# Patient Record
Sex: MALE | Race: WHITE | NOT HISPANIC OR LATINO | URBAN - METROPOLITAN AREA
[De-identification: names, ages, dates, MRNs, and addresses within clinical notes are randomized per-mention and may not be internally consistent; named-entity substitution may affect disease eponyms.]

---

## 2018-01-01 ENCOUNTER — INPATIENT (INPATIENT)
Facility: HOSPITAL | Age: 53
LOS: 0 days | Discharge: LEFT BEFORE DISCHARGE | End: 2018-01-01
Attending: INTERNAL MEDICINE

## 2018-01-01 DIAGNOSIS — R07.81 PLEURODYNIA: ICD-10-CM

## 2018-01-05 DIAGNOSIS — Z80.1 FAMILY HISTORY OF MALIGNANT NEOPLASM OF TRACHEA, BRONCHUS AND LUNG: ICD-10-CM

## 2018-01-05 DIAGNOSIS — E66.3 OVERWEIGHT: ICD-10-CM

## 2018-01-05 DIAGNOSIS — Z82.3 FAMILY HISTORY OF STROKE: ICD-10-CM

## 2018-01-05 DIAGNOSIS — R07.9 CHEST PAIN, UNSPECIFIED: ICD-10-CM

## 2018-01-05 DIAGNOSIS — M94.0 CHONDROCOSTAL JUNCTION SYNDROME [TIETZE]: ICD-10-CM

## 2018-01-05 DIAGNOSIS — R07.81 PLEURODYNIA: ICD-10-CM

## 2019-06-11 ENCOUNTER — OUTPATIENT (OUTPATIENT)
Dept: OUTPATIENT SERVICES | Facility: HOSPITAL | Age: 54
LOS: 1 days | Discharge: HOME | End: 2019-06-11

## 2019-06-11 DIAGNOSIS — E55.9 VITAMIN D DEFICIENCY, UNSPECIFIED: ICD-10-CM

## 2019-06-11 DIAGNOSIS — D64.9 ANEMIA, UNSPECIFIED: ICD-10-CM

## 2019-06-11 DIAGNOSIS — Z00.00 ENCOUNTER FOR GENERAL ADULT MEDICAL EXAMINATION WITHOUT ABNORMAL FINDINGS: ICD-10-CM

## 2019-06-11 DIAGNOSIS — N18.2 CHRONIC KIDNEY DISEASE, STAGE 2 (MILD): ICD-10-CM

## 2019-06-11 DIAGNOSIS — N39.0 URINARY TRACT INFECTION, SITE NOT SPECIFIED: ICD-10-CM

## 2019-06-11 DIAGNOSIS — K76.89 OTHER SPECIFIED DISEASES OF LIVER: ICD-10-CM

## 2019-09-23 ENCOUNTER — OUTPATIENT (OUTPATIENT)
Dept: OUTPATIENT SERVICES | Facility: HOSPITAL | Age: 54
LOS: 1 days | Discharge: HOME | End: 2019-09-23

## 2019-09-23 DIAGNOSIS — R79.89 OTHER SPECIFIED ABNORMAL FINDINGS OF BLOOD CHEMISTRY: ICD-10-CM

## 2019-09-23 DIAGNOSIS — D64.9 ANEMIA, UNSPECIFIED: ICD-10-CM

## 2019-09-23 DIAGNOSIS — E11.65 TYPE 2 DIABETES MELLITUS WITH HYPERGLYCEMIA: ICD-10-CM

## 2020-10-14 ENCOUNTER — INPATIENT (INPATIENT)
Facility: HOSPITAL | Age: 55
LOS: 2 days | Discharge: HOME | End: 2020-10-17
Attending: INTERNAL MEDICINE | Admitting: HOSPITALIST
Payer: COMMERCIAL

## 2020-10-14 VITALS
DIASTOLIC BLOOD PRESSURE: 81 MMHG | WEIGHT: 255.07 LBS | SYSTOLIC BLOOD PRESSURE: 154 MMHG | OXYGEN SATURATION: 97 % | RESPIRATION RATE: 18 BRPM | TEMPERATURE: 98 F | HEART RATE: 96 BPM

## 2020-10-14 LAB
ALBUMIN SERPL ELPH-MCNC: 4.4 G/DL — SIGNIFICANT CHANGE UP (ref 3.5–5.2)
ALP SERPL-CCNC: 46 U/L — SIGNIFICANT CHANGE UP (ref 30–115)
ALT FLD-CCNC: 49 U/L — HIGH (ref 0–41)
ANION GAP SERPL CALC-SCNC: 14 MMOL/L — SIGNIFICANT CHANGE UP (ref 7–14)
AST SERPL-CCNC: 38 U/L — SIGNIFICANT CHANGE UP (ref 0–41)
B-OH-BUTYR SERPL-SCNC: <0.2 MMOL/L — SIGNIFICANT CHANGE UP
BASOPHILS # BLD AUTO: 0.06 K/UL — SIGNIFICANT CHANGE UP (ref 0–0.2)
BASOPHILS NFR BLD AUTO: 0.7 % — SIGNIFICANT CHANGE UP (ref 0–1)
BILIRUB SERPL-MCNC: 0.7 MG/DL — SIGNIFICANT CHANGE UP (ref 0.2–1.2)
BUN SERPL-MCNC: 19 MG/DL — SIGNIFICANT CHANGE UP (ref 10–20)
CALCIUM SERPL-MCNC: 10.2 MG/DL — HIGH (ref 8.5–10.1)
CHLORIDE SERPL-SCNC: 102 MMOL/L — SIGNIFICANT CHANGE UP (ref 98–110)
CO2 SERPL-SCNC: 21 MMOL/L — SIGNIFICANT CHANGE UP (ref 17–32)
CREAT SERPL-MCNC: 1 MG/DL — SIGNIFICANT CHANGE UP (ref 0.7–1.5)
EOSINOPHIL # BLD AUTO: 0.2 K/UL — SIGNIFICANT CHANGE UP (ref 0–0.7)
EOSINOPHIL NFR BLD AUTO: 2.5 % — SIGNIFICANT CHANGE UP (ref 0–8)
GLUCOSE BLDC GLUCOMTR-MCNC: 151 MG/DL — HIGH (ref 70–99)
GLUCOSE BLDC GLUCOMTR-MCNC: 176 MG/DL — HIGH (ref 70–99)
GLUCOSE SERPL-MCNC: 180 MG/DL — HIGH (ref 70–99)
HCT VFR BLD CALC: 47.8 % — SIGNIFICANT CHANGE UP (ref 42–52)
HGB BLD-MCNC: 16.5 G/DL — SIGNIFICANT CHANGE UP (ref 14–18)
IMM GRANULOCYTES NFR BLD AUTO: 0.5 % — HIGH (ref 0.1–0.3)
LYMPHOCYTES # BLD AUTO: 1.52 K/UL — SIGNIFICANT CHANGE UP (ref 1.2–3.4)
LYMPHOCYTES # BLD AUTO: 19 % — LOW (ref 20.5–51.1)
MCHC RBC-ENTMCNC: 30.2 PG — SIGNIFICANT CHANGE UP (ref 27–31)
MCHC RBC-ENTMCNC: 34.5 G/DL — SIGNIFICANT CHANGE UP (ref 32–37)
MCV RBC AUTO: 87.4 FL — SIGNIFICANT CHANGE UP (ref 80–94)
MONOCYTES # BLD AUTO: 0.69 K/UL — HIGH (ref 0.1–0.6)
MONOCYTES NFR BLD AUTO: 8.6 % — SIGNIFICANT CHANGE UP (ref 1.7–9.3)
NEUTROPHILS # BLD AUTO: 5.51 K/UL — SIGNIFICANT CHANGE UP (ref 1.4–6.5)
NEUTROPHILS NFR BLD AUTO: 68.7 % — SIGNIFICANT CHANGE UP (ref 42.2–75.2)
NRBC # BLD: 0 /100 WBCS — SIGNIFICANT CHANGE UP (ref 0–0)
PLATELET # BLD AUTO: 211 K/UL — SIGNIFICANT CHANGE UP (ref 130–400)
POTASSIUM SERPL-MCNC: 4.4 MMOL/L — SIGNIFICANT CHANGE UP (ref 3.5–5)
POTASSIUM SERPL-SCNC: 4.4 MMOL/L — SIGNIFICANT CHANGE UP (ref 3.5–5)
PROT SERPL-MCNC: 7.3 G/DL — SIGNIFICANT CHANGE UP (ref 6–8)
RBC # BLD: 5.47 M/UL — SIGNIFICANT CHANGE UP (ref 4.7–6.1)
RBC # FLD: 11.9 % — SIGNIFICANT CHANGE UP (ref 11.5–14.5)
SARS-COV-2 RNA SPEC QL NAA+PROBE: SIGNIFICANT CHANGE UP
SODIUM SERPL-SCNC: 137 MMOL/L — SIGNIFICANT CHANGE UP (ref 135–146)
TROPONIN T SERPL-MCNC: <0.01 NG/ML — SIGNIFICANT CHANGE UP
TROPONIN T SERPL-MCNC: <0.01 NG/ML — SIGNIFICANT CHANGE UP
WBC # BLD: 8.02 K/UL — SIGNIFICANT CHANGE UP (ref 4.8–10.8)
WBC # FLD AUTO: 8.02 K/UL — SIGNIFICANT CHANGE UP (ref 4.8–10.8)

## 2020-10-14 PROCEDURE — 93010 ELECTROCARDIOGRAM REPORT: CPT

## 2020-10-14 PROCEDURE — 99222 1ST HOSP IP/OBS MODERATE 55: CPT

## 2020-10-14 PROCEDURE — 70496 CT ANGIOGRAPHY HEAD: CPT | Mod: 26

## 2020-10-14 PROCEDURE — 70498 CT ANGIOGRAPHY NECK: CPT | Mod: 26

## 2020-10-14 PROCEDURE — 99285 EMERGENCY DEPT VISIT HI MDM: CPT

## 2020-10-14 RX ORDER — ASPIRIN/CALCIUM CARB/MAGNESIUM 324 MG
325 TABLET ORAL ONCE
Refills: 0 | Status: COMPLETED | OUTPATIENT
Start: 2020-10-14 | End: 2020-10-14

## 2020-10-14 RX ORDER — BACLOFEN 100 %
10 POWDER (GRAM) MISCELLANEOUS THREE TIMES A DAY
Refills: 0 | Status: DISCONTINUED | OUTPATIENT
Start: 2020-10-14 | End: 2020-10-17

## 2020-10-14 RX ORDER — INFLUENZA VIRUS VACCINE 15; 15; 15; 15 UG/.5ML; UG/.5ML; UG/.5ML; UG/.5ML
0.5 SUSPENSION INTRAMUSCULAR ONCE
Refills: 0 | Status: COMPLETED | OUTPATIENT
Start: 2020-10-14 | End: 2020-10-14

## 2020-10-14 RX ORDER — INSULIN LISPRO 100/ML
5 VIAL (ML) SUBCUTANEOUS
Refills: 0 | Status: DISCONTINUED | OUTPATIENT
Start: 2020-10-14 | End: 2020-10-17

## 2020-10-14 RX ORDER — SODIUM CHLORIDE 9 MG/ML
1000 INJECTION INTRAMUSCULAR; INTRAVENOUS; SUBCUTANEOUS ONCE
Refills: 0 | Status: COMPLETED | OUTPATIENT
Start: 2020-10-14 | End: 2020-10-14

## 2020-10-14 RX ORDER — SODIUM CHLORIDE 9 MG/ML
1000 INJECTION, SOLUTION INTRAVENOUS
Refills: 0 | Status: DISCONTINUED | OUTPATIENT
Start: 2020-10-14 | End: 2020-10-17

## 2020-10-14 RX ORDER — ENOXAPARIN SODIUM 100 MG/ML
40 INJECTION SUBCUTANEOUS AT BEDTIME
Refills: 0 | Status: DISCONTINUED | OUTPATIENT
Start: 2020-10-14 | End: 2020-10-17

## 2020-10-14 RX ORDER — INSULIN GLARGINE 100 [IU]/ML
15 INJECTION, SOLUTION SUBCUTANEOUS AT BEDTIME
Refills: 0 | Status: DISCONTINUED | OUTPATIENT
Start: 2020-10-14 | End: 2020-10-17

## 2020-10-14 RX ORDER — GLUCAGON INJECTION, SOLUTION 0.5 MG/.1ML
1 INJECTION, SOLUTION SUBCUTANEOUS ONCE
Refills: 0 | Status: DISCONTINUED | OUTPATIENT
Start: 2020-10-14 | End: 2020-10-17

## 2020-10-14 RX ORDER — DEXTROSE 50 % IN WATER 50 %
15 SYRINGE (ML) INTRAVENOUS ONCE
Refills: 0 | Status: DISCONTINUED | OUTPATIENT
Start: 2020-10-14 | End: 2020-10-17

## 2020-10-14 RX ORDER — DEXTROSE 50 % IN WATER 50 %
12.5 SYRINGE (ML) INTRAVENOUS ONCE
Refills: 0 | Status: DISCONTINUED | OUTPATIENT
Start: 2020-10-14 | End: 2020-10-17

## 2020-10-14 RX ORDER — DEXTROSE 50 % IN WATER 50 %
25 SYRINGE (ML) INTRAVENOUS ONCE
Refills: 0 | Status: DISCONTINUED | OUTPATIENT
Start: 2020-10-14 | End: 2020-10-17

## 2020-10-14 RX ORDER — AMLODIPINE BESYLATE 2.5 MG/1
5 TABLET ORAL DAILY
Refills: 0 | Status: DISCONTINUED | OUTPATIENT
Start: 2020-10-14 | End: 2020-10-17

## 2020-10-14 RX ORDER — INSULIN LISPRO 100/ML
VIAL (ML) SUBCUTANEOUS
Refills: 0 | Status: DISCONTINUED | OUTPATIENT
Start: 2020-10-14 | End: 2020-10-17

## 2020-10-14 RX ORDER — ASPIRIN/CALCIUM CARB/MAGNESIUM 324 MG
81 TABLET ORAL DAILY
Refills: 0 | Status: DISCONTINUED | OUTPATIENT
Start: 2020-10-15 | End: 2020-10-17

## 2020-10-14 RX ORDER — ATORVASTATIN CALCIUM 80 MG/1
80 TABLET, FILM COATED ORAL AT BEDTIME
Refills: 0 | Status: DISCONTINUED | OUTPATIENT
Start: 2020-10-14 | End: 2020-10-17

## 2020-10-14 RX ADMIN — SODIUM CHLORIDE 1000 MILLILITER(S): 9 INJECTION INTRAMUSCULAR; INTRAVENOUS; SUBCUTANEOUS at 12:26

## 2020-10-14 RX ADMIN — ENOXAPARIN SODIUM 40 MILLIGRAM(S): 100 INJECTION SUBCUTANEOUS at 22:40

## 2020-10-14 RX ADMIN — Medication 325 MILLIGRAM(S): at 15:16

## 2020-10-14 RX ADMIN — INSULIN GLARGINE 15 UNIT(S): 100 INJECTION, SOLUTION SUBCUTANEOUS at 23:15

## 2020-10-14 RX ADMIN — ATORVASTATIN CALCIUM 80 MILLIGRAM(S): 80 TABLET, FILM COATED ORAL at 22:41

## 2020-10-14 NOTE — CONSULT NOTE ADULT - SUBJECTIVE AND OBJECTIVE BOX
PEDRITO DIAZ     55y     Male    MRN-[id number]                                                           CC:Patient is a 55y old  Male who presents with a chief complaint of     HPI: 54 yo m hx DM- poorly compliant  at 8:30am, pt was watching TV and had sudden painless vision loss x seconds. pt states he felt shaky/anxious after. pt states vision significantly improved after/with glasses but not baseline. pt states FS was 300 at that time, however, pt had just eaten breakfast. no numbness/weakness/ataxia/slurred speech. no fever/chills/cough.    Patient seen and examined and complains of sudden change in his vision at 8:30 this morning.  Since then his vision has been blurry.  Vision does not improve when he covers one eye and both eyes are blurry.  He has been complaining of increased urination at night where he may wake up 4-5 time per night to urinate.  He admits to right shoulder pain due too injury a few months ago.  He also has been having numbness to the tips of the fingers in his left hand.  Admits to severe stress in his family life currently more then he can deal with.    ROS:  Constitutional, Neurological, Psychiatric, Eyes, ENT, Cardiovascular, Respiratory, Gastrointestinal, Genitourinary, Musculoskeletal, Integumentary, Endocrine and Heme/Lymph are otherwise negative. Except for above    Social History: No smoking, No drinking, No drug use    FAMILY HISTORY: non contributory      HEALTH ISSUES - PROBLEM Dx:          Vital Signs Last 24 Hrs  T(C): 36.9 (14 Oct 2020 11:18), Max: 36.9 (14 Oct 2020 11:18)  T(F): 98.4 (14 Oct 2020 11:18), Max: 98.4 (14 Oct 2020 11:18)  HR: 96 (14 Oct 2020 11:18) (96 - 96)  BP: 154/81 (14 Oct 2020 11:18) (154/81 - 154/81)  BP(mean): --  RR: 18 (14 Oct 2020 11:18) (18 - 18)  SpO2: 97% (14 Oct 2020 11:18) (97% - 97%)    Physical Exam:  Constitutional: alert and in no acute distress.  Eyes: the sclera and conjunctiva were normal, pupils were equal in size, round, reactive to light, with normal accommodation and extraocular movements were intact.   Back: no costovertebral angle tenderness and no spinal tenderness.      Neuro Exam:  Orientation: oriented to person, oriented to place and oriented to time.   Attention: normal concentrating ability and visual attention was not decreased.   Language: no difficulty naming common objects, no difficulty repeating a phrase, no difficulty writing a sentence, fluency intact, comprehension intact and reading intact.   Fund of knowledge: displays adequate knowledge of personal past history.   Cranial Nerves: visual acuity decreased b/l, visual fields full to confrontation, pupils equal round and reactive to light, extraocular motion intact, facial sensation intact symmetrically, face symmetrical, hearing was intact bilaterally, tongue and palate midline, head turning and shoulder shrug symmetric and there was no tongue deviation with protrusion.  No change in blurriness with one eye closed.  Has b/l partial ptosis unclear if new says his eyes have been tired  Motor: power 5/5 except in RUE proximally limited secondary to shoulder pain, arm flexion 4+/5 and finger extension 5-/5, right HF 5-/5  Sensory exam: light touch, temp, vib was intact.   Coordination:. normal gait. balance was intact. there was no past-pointing. no tremor present.   Deep tendon reflexes:   1+ in UE and absent in LE  Plantar responses normal on the right, normal on the left.      NIHSS: 0  mrankin 0    Allergies    No Known Allergies    Intolerances       Home Medications:      MEDICATIONS  (STANDING):  aspirin 325 milliGRAM(s) Oral Once    MEDICATIONS  (PRN):      LABS:                        16.5   8.02  )-----------( 211      ( 14 Oct 2020 12:16 )             47.8     10-14    137  |  102  |  19  ----------------------------<  180<H>  4.4   |  21  |  1.0    Ca    10.2<H>      14 Oct 2020 12:16    TPro  7.3  /  Alb  4.4  /  TBili  0.7  /  DBili  x   /  AST  38  /  ALT  49<H>  /  AlkPhos  46  10-14            Neuro Imaging:  NCHCT:   < from: CT Angio Head w/ IV Cont (10.14.20 @ 13:43) >      PROCEDURE DATE:  10/14/2020            INTERPRETATION:  CLINICAL INDICATION: Sudden onset vision changes. .    TECHNIQUE: Noncontrast head CT was performed. In addition, CT angiography of the intracranial (head) and extracranial (neck) circulation was performed after contrast administration.    Multiple contiguous axial slices were obtained from the skull base to vertex without intravenous contrast. Reformatted coronal and sagittal images were subsequently obtained and reviewed.    Maximal intensity projection images were additionally included and reviewed.    100 mls of Omnipaque 350 was administered intravenously without complication and 0 mls were discarded.    Using a separate workstation, 3-D shaded surface reformations were made of vasculature. 3-D reformations were reviewed and included in interpretation of the official report.    CAROTID STENOSIS REFERENCE: (NASCET = 100x1-(N/D)). N=greatest narrowing. D=normal distal diameter - MILD = <50% stenosis. - MODERATE = 50-69% stenosis. - SEVERE = 70-89% stenosis. - HAIRLINE/CRITICAL = 90-99% stenosis. - OCCLUDED = 100% stenosis.    COMPARISON: None.    FINDINGS:    HEAD CT:    There is no evidence for acute intracranial hemorrhage, mass effect or midline shift.    The basal cisterns are patent. There is no hydrocephalus.    There is no extra-axial collection.    The visualized orbits are unremarkable.    The calvarium is intact, without depressed fracture.    The visualized paranasal sinuses and mastoid air cells are clear.    HEAD CTA:    The internal carotid arteries demonstrate normal enhancement to the intracranial circulation and [place]. There is atherosclerotic calcification of the cavernous carotids bilaterally without significant stenosis.    Anterior and middle cerebral arteries demonstrate normal enhancement and symmetric arborization without intraluminal filling defect, stenosis, occlusion, aneurysm or vascular malformation.    The ophthalmic arteries are patent.    The intradural vertebral arteries demonstrate normal enhancement to the vertebrobasilar confluence. Branch vasculature of the posterior circulation are within normal limits. The posterior cerebral arteries demonstrate symmetric enhancement and arborization without evidence for aneurysm, stenosis, occlusion or vascular malformation. The bilateral PCAs are fetal in origin.    Visualized dural venous sinuses and deep cerebral venous structures demonstrate normal enhancement without evidence for filling defect.    NECK CTA:    The aortic arch and origins of the great vessels are within normal limits.    Right:  The origin of the right common carotid artery is normal. The right common carotid artery is normal in course and caliber to the carotid bifurcation. There is atherosclerotic calcification at the carotid bulb without significant stenosis. Origins of the internal and external carotid arteries are normal by NASCET criteria. The right internal carotid artery has normal course and caliber to the intracranial circulation.    The origin of the right vertebral artery is normal. The right vertebral artery is normal in course and caliber to the intracranial circulation.    Left: The origin of the left common carotid artery is normal. The left common carotid artery is normal in course and caliber to the carotid bifurcation. There is atherosclerotic calcification of the carotid bulb without significant stenosis. Origins of the internal and external carotid arteries are normal by NASCET criteria. The left internal carotid artery has normal course and caliber to the intracranial circulation.    The origin of the left vertebral artery is normal. The left vertebral arteryis normal in course and caliber to the intracranial circulation.    Multifocal periodontal and periapical lucencies consistent with odontogenic disease.    IMPRESSION:    CT HEAD:  No acute intracranial pathology. No evidence of midline shift, mass effect or intracranial hemorrhage.    Orbits are grossly unremarkable.    CTA HEAD:  No evidence of flow-limiting stenosis, occlusion or aneurysm.    Ophthalmic arteries appear patent and unremarkable.    CTA NECK:  No evidence of carotid or vertebral artery stenosis.    < end of copied text >      Assessment / Plan: This is a 55y year old Male presenting with blurry vision of acute onset in both eyes.  Given his diabetes which is likely poorly controlled this may be directly related to diabetic retinopathy however the acuity of it may suggest an ischemic etiology.  The right sided weakness although he relates to his shoulder issues is not fully explained by this and warrants further evaluation with MRI brain to rule out stroke  1. Admit to telemetry  2. Ophtho evaluation  3. Aspirin 325mg x1 then 81mg QD, give atorvastatin 80mg QD  4. MRI brain w/o ELLIOTT  5. 2DECHO  6. Hgba1c and lipid profile

## 2020-10-14 NOTE — ED PROVIDER NOTE - PROGRESS NOTE DETAILS
neuro exam unchanged. ed w/u neg apart from hyperglycemia, will d/w neuro CO- pt endorsed to Dr. Otero: pending neuro c/s, dispo Evaluated by Neuro Dr. Meneses, admit to tele, MR w/o gado, ASA, 2D echo. For Dr. Zaidi exam, had R hand weakness. Authored by Dr Otero: the patient was endorsed to me to follow up neurology consult and dispo.  Pt was seen by neurology service, admission to tele for further work up was recommended.  Admit. Endorsed to Dr. Astudillo, requests cardio consult also

## 2020-10-14 NOTE — H&P ADULT - NSHPLABSRESULTS_GEN_ALL_CORE
16.5   8.02  )-----------( 211      ( 14 Oct 2020 12:16 )             47.8             10-14    137  |  102  |  19  ----------------------------<  180<H>  4.4   |  21  |  1.0    Ca    10.2<H>      14 Oct 2020 12:16    TPro  7.3  /  Alb  4.4  /  TBili  0.7  /  DBili  x   /  AST  38  /  ALT  49<H>  /  AlkPhos  46  10-14    LIVER FUNCTIONS - ( 14 Oct 2020 12:16 )  Alb: 4.4 g/dL / Pro: 7.3 g/dL / ALK PHOS: 46 U/L / ALT: 49 U/L / AST: 38 U/L / GGT: x                   CARDIAC MARKERS ( 14 Oct 2020 12:33 )  x     / <0.01 ng/mL / x     / x     / x                < from: CT Head No Cont (10.14.20 @ 13:22) >    IMPRESSION:    CT HEAD:  No acute intracranial pathology. No evidence of midline shift, mass effect or intracranial hemorrhage.    Orbits are grossly unremarkable.    CTA HEAD:  No evidence of flow-limiting stenosis, occlusion or aneurysm.    Ophthalmic arteries appear patent and unremarkable.    CTA NECK:  No evidence of carotid or vertebral artery stenosis.

## 2020-10-14 NOTE — ED PROVIDER NOTE - ATTENDING CONTRIBUTION TO CARE
54 yo m hx DM- poorly compliant  at 8:30am, pt was watching TV and had sudden painless vision loss x seconds. pt states he felt shaky/anxious after. pt states vision significantly improved after/with glasses but not baseline. pt states FS was 300 at that time, however, pt had just eaten breakfast. no numbness/weakness/ataxia/slurred speech. no fever/chills/cough.    vss  gen- NAD, aaox3  HENT- visual acuity 20/30 b/l  card-rrr  lungs-ctab, no wheezing or rhonchi  abd-sntnd, no guarding or rebound  neuro- full str/sensation, cn ii-xii grossly intact, normal coordination and gait    episode of painless vision loss, c/f cva/tia  will get basic labs, r/o DKA given poorly managed diabetic, ekg, cth, cta head/neck, neuro c/s

## 2020-10-14 NOTE — ED PROVIDER NOTE - PHYSICAL EXAMINATION
CONST: Well appearing in NAD  EYES: PERRL, EOMI, Sclera and conjunctiva clear. Vision 20/30 bilaterally  ENT: No nasal discharge.   NECK: Non-tender, no meningeal signs  CARD: Normal S1 S2; Normal rate and rhythm  RESP: Equal BS B/L, No wheezes, rhonchi or rales. No distress  GI: Soft, non-tender, non-distended.  MS: Normal ROM in all extremities. No midline spinal tenderness.  SKIN: Warm, dry, no acute rashes. Good turgor  NEURO: A&Ox3, No focal deficits. Strength 5/5 with no sensory deficits. Steady gait. Appropriate finger to nose testing. [place] intact. Negative romberg/drift.

## 2020-10-14 NOTE — ED PROVIDER NOTE - NS ED ROS FT
CONST: No fever, chills or bodyaches  EYES: No pain, redness, drainage or visual changes.  ENT: No ear pain or discharge, nasal discharge or congestion. No sore throat  CARD: No chest pain, palpitations  RESP: No SOB, cough  GI: No abdominal pain, N/V/D  MS: No joint pain, back pain or extremity pain/injury  SKIN: No rashes  NEURO: see HPI

## 2020-10-14 NOTE — ED ADULT TRIAGE NOTE - CHIEF COMPLAINT QUOTE
Pt c/o episode of b/l vision loss that lasted a few seconds at approx 830, pt also endorses feeling shaky and weak during this time, pt back to baseline at this time, pt fs 242 in triage

## 2020-10-14 NOTE — ED PROVIDER NOTE - CARE PLAN
Principal Discharge DX:	Vision changes  Secondary Diagnosis:	Paresthesias in left hand  Secondary Diagnosis:	Weakness of extremity  Secondary Diagnosis:	Abnormal EKG

## 2020-10-14 NOTE — H&P ADULT - HISTORY OF PRESENT ILLNESS
56 yo m PMH DM comes to the ED reporting h/o sudden onset transient visual loss.  at 8:30am, pt was watching TV and had sudden painless vision loss x seconds. pt states he felt shaky/anxious after. pt states vision significantly improved after/with glasses but not baseline.   Since then his vision has been blurry.  Vision does not improve when he covers one eye and both eyes are blurry.  He has been complaining of increased urination at night where he may wake up 4-5 time per night to urinate.  He admits to right shoulder pain due too injury a few months ago.  He also has been having numbness to the tips of the fingers in his left hand.  Admits to severe stress in his family life currently more then he can deal with.    In the ED:  CT head and CTA head and neck negative  but persistent left UE numbness and blurry vision; seen by Neuro and recommended stroke unit admission and workup

## 2020-10-14 NOTE — ED PROVIDER NOTE - OBJECTIVE STATEMENT
56yo male with PMHx DM intermittently compliant with Metformin presents c/o sudden vision change at 0830. Pt reports awoke at 0600, was able to see TV clearly and read but at 0830, he notes vision became "crossed and both eye saw completely gray field" for several seconds. Pt notes although vision returned, but is now blurred bilaterally. Pt also noted "tingling" to L hand fingertips which is still occurring and pressure to frontal head. Denies any motor loss or weakness. No speech or gait difficulties. Wears corrective lenses but only for distance. 54yo male with PMHx DM intermittently compliant with Metformin presents c/o sudden vision change at 0830. Pt reports awoke at 0600, was able to see TV clearly and read but at 0830, he notes vision became "crossed and both eye saw completely gray field" for several seconds. Pt notes although vision returned, but is now blurred bilaterally. Pt also noted "tingling" to L hand fingertips which is still occurring and pressure to frontal head. Denies any motor loss or weakness. No speech or gait difficulties. Wears corrective lenses but only for distance. Of note, pt reported FS this AM was 300, typically in 100s, but recalled checking FS after eating toast

## 2020-10-14 NOTE — H&P ADULT - ASSESSMENT
# rule out ischemic stroke   Admit to stroke unit  Ophthalmology eval for blurry vision  Aspirin 325mg x1 then 81mg QD, give atorvastatin 80mg QD  MRI brain w/o ELLIOTT  2D ECHO  Hgba1c and lipid profile    DM:  start on lantus/lispro/ss      DVT ppx: lovenox   # rule out ischemic stroke   Admit to stroke unit  Ophthalmology eval for blurry vision  Aspirin 325mg x1 then 81mg QD, give atorvastatin 80mg QD  MRI brain w/o ELLIOTT  2D ECHO  Hgba1c and lipid profile  cardiology eval (Dr to) for t wave inversion in II avf (new), v4-v6 old  repeat troponin 2nd set    DM:  start on lantus/lispro/ss      DVT ppx: lovenox   # rule out ischemic stroke   Admit to stroke unit  Ophthalmology eval for blurry vision  Aspirin 325mg x1 then 81mg QD, give atorvastatin 80mg QD  MRI brain w/o ELLIOTT  2D ECHO  Hgba1c and lipid profile  cardiology eval (Dr to) for t wave inversion in II avf (new), v4-v6 old  repeat troponin 2nd set    DM:  start on lantus/lispro/ss    HTN: amlodipine    DVT ppx: lovenox

## 2020-10-14 NOTE — ED ADULT NURSE REASSESSMENT NOTE - NS ED NURSE REASSESS COMMENT FT1
Patient refused the Blood pressure medication . As per patient he don't take medication ,so he want to moniter .patient made aware of the need of medication still refusing it

## 2020-10-14 NOTE — PATIENT PROFILE ADULT - NSPROPOAURINARYCATHETER_GEN_A_NUR
Pt admitted for b/l foot ulcers w/ septic arthritis in fifth MTP on the left foot and chronic osteomyelitis of right fifth MTP on right foot.
no

## 2020-10-14 NOTE — H&P ADULT - ATTENDING COMMENTS
55 r old male presented with transient complete visual loss.  VITAL SIGNS (Last 24 hrs):  T(C): 36.4 (10-15-20 @ 13:09), Max: 37.1 (10-14-20 @ 20:24)  HR: 98 (10-15-20 @ 13:09) (83 - 98)  BP: 141/83 (10-15-20 @ 13:09) (124/62 - 184/92)  RR: 18 (10-15-20 @ 13:09) (18 - 18)  SpO2: 98% (10-14-20 @ 20:24) (98% - 98%)  Wt(kg): --  Daily Height in cm: 180.34 (14 Oct 2020 22:19)    Daily Weight in k.7 (15 Oct 2020 05:27)    I&O's Summary                        15.4   6.88  )-----------( 192      ( 15 Oct 2020 05:49 )             44.6   10-15    140  |  104  |  16  ----------------------------<  146<H>  4.6   |  25  |  0.9    Ca    9.5      15 Oct 2020 05:49    TPro  6.5  /  Alb  4.1  /  TBili  0.7  /  DBili  x   /  AST  30  /  ALT  43<H>  /  AlkPhos  39  10-15  ekg-NSR with TWI in lead 2, 3 and AVF were old--also seen on ekg from 2018  o/e  aaox3  chest-b/l air entry  cvs-s1s2n  abd--soft, bs+   no edema  assessment and plan:  #R/o ischemic stroke-- MRI brain pending  echo pending  opthalmology eval-- r/o amaurosis fugax  #mild diabetes-- will need to back on outpatient metformin at [facility]-- diet compliance  # EKG changes in inf leads were old as present in 2018.no chest pain.

## 2020-10-14 NOTE — ED PROVIDER NOTE - CLINICAL SUMMARY MEDICAL DECISION MAKING FREE TEXT BOX
Patient with transient vision loss, seen by neurology, needs further work up, admitted to tele. Case endorsed to MAR.

## 2020-10-15 LAB
A1C WITH ESTIMATED AVERAGE GLUCOSE RESULT: 7.1 % — HIGH (ref 4–5.6)
ALBUMIN SERPL ELPH-MCNC: 4.1 G/DL — SIGNIFICANT CHANGE UP (ref 3.5–5.2)
ALP SERPL-CCNC: 39 U/L — SIGNIFICANT CHANGE UP (ref 30–115)
ALT FLD-CCNC: 43 U/L — HIGH (ref 0–41)
ANION GAP SERPL CALC-SCNC: 11 MMOL/L — SIGNIFICANT CHANGE UP (ref 7–14)
AST SERPL-CCNC: 30 U/L — SIGNIFICANT CHANGE UP (ref 0–41)
BASOPHILS # BLD AUTO: 0.04 K/UL — SIGNIFICANT CHANGE UP (ref 0–0.2)
BASOPHILS NFR BLD AUTO: 0.6 % — SIGNIFICANT CHANGE UP (ref 0–1)
BILIRUB SERPL-MCNC: 0.7 MG/DL — SIGNIFICANT CHANGE UP (ref 0.2–1.2)
BUN SERPL-MCNC: 16 MG/DL — SIGNIFICANT CHANGE UP (ref 10–20)
CALCIUM SERPL-MCNC: 9.5 MG/DL — SIGNIFICANT CHANGE UP (ref 8.5–10.1)
CHLORIDE SERPL-SCNC: 104 MMOL/L — SIGNIFICANT CHANGE UP (ref 98–110)
CHOLEST SERPL-MCNC: 174 MG/DL — SIGNIFICANT CHANGE UP (ref 100–200)
CO2 SERPL-SCNC: 25 MMOL/L — SIGNIFICANT CHANGE UP (ref 17–32)
CREAT SERPL-MCNC: 0.9 MG/DL — SIGNIFICANT CHANGE UP (ref 0.7–1.5)
EOSINOPHIL # BLD AUTO: 0.29 K/UL — SIGNIFICANT CHANGE UP (ref 0–0.7)
EOSINOPHIL NFR BLD AUTO: 4.2 % — SIGNIFICANT CHANGE UP (ref 0–8)
ESTIMATED AVERAGE GLUCOSE: 157 MG/DL — HIGH (ref 68–114)
GLUCOSE BLDC GLUCOMTR-MCNC: 122 MG/DL — HIGH (ref 70–99)
GLUCOSE BLDC GLUCOMTR-MCNC: 128 MG/DL — HIGH (ref 70–99)
GLUCOSE BLDC GLUCOMTR-MCNC: 142 MG/DL — HIGH (ref 70–99)
GLUCOSE BLDC GLUCOMTR-MCNC: 170 MG/DL — HIGH (ref 70–99)
GLUCOSE SERPL-MCNC: 146 MG/DL — HIGH (ref 70–99)
HCT VFR BLD CALC: 44.6 % — SIGNIFICANT CHANGE UP (ref 42–52)
HCV AB S/CO SERPL IA: 0.04 COI — SIGNIFICANT CHANGE UP
HCV AB SERPL-IMP: SIGNIFICANT CHANGE UP
HDLC SERPL-MCNC: 52 MG/DL — SIGNIFICANT CHANGE UP
HGB BLD-MCNC: 15.4 G/DL — SIGNIFICANT CHANGE UP (ref 14–18)
IMM GRANULOCYTES NFR BLD AUTO: 0.4 % — HIGH (ref 0.1–0.3)
LIPID PNL WITH DIRECT LDL SERPL: 88 MG/DL — SIGNIFICANT CHANGE UP (ref 4–129)
LYMPHOCYTES # BLD AUTO: 2.09 K/UL — SIGNIFICANT CHANGE UP (ref 1.2–3.4)
LYMPHOCYTES # BLD AUTO: 30.4 % — SIGNIFICANT CHANGE UP (ref 20.5–51.1)
MCHC RBC-ENTMCNC: 30.6 PG — SIGNIFICANT CHANGE UP (ref 27–31)
MCHC RBC-ENTMCNC: 34.5 G/DL — SIGNIFICANT CHANGE UP (ref 32–37)
MCV RBC AUTO: 88.7 FL — SIGNIFICANT CHANGE UP (ref 80–94)
MONOCYTES # BLD AUTO: 0.72 K/UL — HIGH (ref 0.1–0.6)
MONOCYTES NFR BLD AUTO: 10.5 % — HIGH (ref 1.7–9.3)
NEUTROPHILS # BLD AUTO: 3.71 K/UL — SIGNIFICANT CHANGE UP (ref 1.4–6.5)
NEUTROPHILS NFR BLD AUTO: 53.9 % — SIGNIFICANT CHANGE UP (ref 42.2–75.2)
NRBC # BLD: 0 /100 WBCS — SIGNIFICANT CHANGE UP (ref 0–0)
PLATELET # BLD AUTO: 192 K/UL — SIGNIFICANT CHANGE UP (ref 130–400)
POTASSIUM SERPL-MCNC: 4.6 MMOL/L — SIGNIFICANT CHANGE UP (ref 3.5–5)
POTASSIUM SERPL-SCNC: 4.6 MMOL/L — SIGNIFICANT CHANGE UP (ref 3.5–5)
PROT SERPL-MCNC: 6.5 G/DL — SIGNIFICANT CHANGE UP (ref 6–8)
RBC # BLD: 5.03 M/UL — SIGNIFICANT CHANGE UP (ref 4.7–6.1)
RBC # FLD: 12 % — SIGNIFICANT CHANGE UP (ref 11.5–14.5)
SODIUM SERPL-SCNC: 140 MMOL/L — SIGNIFICANT CHANGE UP (ref 135–146)
TOTAL CHOLESTEROL/HDL RATIO MEASUREMENT: 3.3 RATIO — LOW (ref 4–5.5)
TRIGL SERPL-MCNC: 285 MG/DL — HIGH (ref 10–149)
WBC # BLD: 6.88 K/UL — SIGNIFICANT CHANGE UP (ref 4.8–10.8)
WBC # FLD AUTO: 6.88 K/UL — SIGNIFICANT CHANGE UP (ref 4.8–10.8)

## 2020-10-15 PROCEDURE — 99223 1ST HOSP IP/OBS HIGH 75: CPT

## 2020-10-15 PROCEDURE — 70551 MRI BRAIN STEM W/O DYE: CPT | Mod: 26

## 2020-10-15 RX ADMIN — Medication 1 MILLIGRAM(S): at 11:40

## 2020-10-15 RX ADMIN — AMLODIPINE BESYLATE 5 MILLIGRAM(S): 2.5 TABLET ORAL at 05:37

## 2020-10-15 RX ADMIN — Medication 5 UNIT(S): at 08:20

## 2020-10-15 RX ADMIN — Medication 1: at 12:59

## 2020-10-15 RX ADMIN — ENOXAPARIN SODIUM 40 MILLIGRAM(S): 100 INJECTION SUBCUTANEOUS at 21:59

## 2020-10-15 RX ADMIN — Medication 81 MILLIGRAM(S): at 12:58

## 2020-10-15 RX ADMIN — Medication 5 UNIT(S): at 12:58

## 2020-10-15 RX ADMIN — ATORVASTATIN CALCIUM 80 MILLIGRAM(S): 80 TABLET, FILM COATED ORAL at 21:59

## 2020-10-15 NOTE — PROGRESS NOTE ADULT - SUBJECTIVE AND OBJECTIVE BOX
DAILY PROGRESS NOTE  ===========================================================    Patient Information:  PEDRITO DIAZ  /  55y  /  Male  /  MRN#: [id number]    Hospital Day: 1d     |:::::::::::::::::::::::::::| SUBJECTIVE |:::::::::::::::::::::::::::|    OVERNIGHT EVENTS: None.   TODAY: Patient was seen today at bedside. Pt has no visual or neuro deficits at this time. Doing well and awaiting MRI and echo. If neg, will D/C today.     |:::::::::::::::::::::::::::| OBJECTIVE |:::::::::::::::::::::::::::|    VITAL SIGNS: Last 24 Hours  T(C): 36 (15 Oct 2020 05:27), Max: 37.1 (14 Oct 2020 20:24)  T(F): 96.8 (15 Oct 2020 05:27), Max: 98.7 (14 Oct 2020 20:24)  HR: 86 (15 Oct 2020 05:27) (83 - 96)  BP: 124/62 (15 Oct 2020 05:27) (124/62 - 184/92)  BP(mean): --  RR: 18 (15 Oct 2020 05:27) (18 - 18)  SpO2: 98% (14 Oct 2020 20:24) (97% - 98%)    PHYSICAL EXAM:  GENERAL:   Awake, alert; NAD.  HEENT:  Head NC/AT; Conjunctivae pink, Sclera anicteric; Oral mucosa moist.  CARDIO:   Regular rate; Regular rhythm; S1 & S2.  RESP:   No rales, wheezing, or rhonchi appreciated.  GI:   Soft; NT/ND; BS; No guarding; No rebound tenderness.  EXT:   Strength UE 5/5; Strength LE 5/5; No edema.   SKIN:   Intact.    LAB RESULTS:                        15.4   6.88  )-----------( 192      ( 15 Oct 2020 05:49 )             44.6     10-15    140  |  104  |  16  ----------------------------<  146<H>  4.6   |  25  |  0.9    Ca    9.5      15 Oct 2020 05:49    TPro  6.5  /  Alb  4.1  /  TBili  0.7  /  DBili  x   /  AST  30  /  ALT  43<H>  /  AlkPhos  39  10-15          Troponin T, Serum: <0.01 ng/mL (10-14-20 @ 20:00)  Troponin T, Serum: <0.01 ng/mL (10-14-20 @ 12:33)    CARDIAC MARKERS ( 14 Oct 2020 20:00 )  x     / <0.01 ng/mL / x     / x     / x      CARDIAC MARKERS ( 14 Oct 2020 12:33 )  x     / <0.01 ng/mL / x     / x     / x          MICROBIOLOGY:  m< from: CT Angio Neck w/ IV Cont (10.14.20 @ 13:43) >  IMPRESSION:    CT HEAD:  No acute intracranial pathology. No evidence of midline shift, mass effect or intracranial hemorrhage.    Orbits are grossly unremarkable.    CTA HEAD:  No evidence of flow-limiting stenosis, occlusion or aneurysm.    Ophthalmic arteries appear patent and unremarkable.    CTA NECK:  No evidence of carotid or vertebral artery stenosis.    RADIOLOGY:    ALLERGIES: No Known Allergies      ===========================================================

## 2020-10-15 NOTE — CONSULT NOTE ADULT - ASSESSMENT
56 Y/O   male presents with   visual disturbance           Consulted for T changes on ECG   no chest  pain   Izzy negative     plan  echo

## 2020-10-15 NOTE — OCCUPATIONAL THERAPY INITIAL EVALUATION ADULT - VISUAL ACUITY
mild impairment visual acuity 2* blurry vision in B/L eyes. However, reports blurriness has gotten better since yesterday with and without Rx prescription lens

## 2020-10-15 NOTE — PHYSICAL THERAPY INITIAL EVALUATION ADULT - PLANNED THERAPY INTERVENTIONS, PT EVAL
PT currently does not require skilled PT. Pt is being discharged from the PT services. Please reconsult if the status changes.

## 2020-10-15 NOTE — OCCUPATIONAL THERAPY INITIAL EVALUATION ADULT - REHAB POTENTIAL, OT EVAL
none/Pt. does not warrant skilled OT services at this time 2* pt. is I with ADLs and functional transfers. OT to be re-consulted if functional status of pt changes.

## 2020-10-15 NOTE — OCCUPATIONAL THERAPY INITIAL EVALUATION ADULT - PERTINENT HX OF CURRENT PROBLEM, REHAB EVAL
54 yo m PMH DM comes to the ED (10/14) reporting h/o sudden onset transient visual loss. He admits to right shoulder pain due too injury a few months ago.  He also has been having numbness to the tips of the fingers in his left hand.  at 8:30am, pt was watching TV and had sudden painless vision loss x seconds. pt states he felt shaky/anxious after. pt states vision significantly improved after/with glasses but not baseline.

## 2020-10-15 NOTE — OCCUPATIONAL THERAPY INITIAL EVALUATION ADULT - MANUAL MUSCLE TESTING RESULTS, REHAB EVAL
BUE grossly WFL; R shoulder slightly limited to 5-/5 with discomfort 2* shoulder injury at work couple months ago as per pt.

## 2020-10-15 NOTE — OCCUPATIONAL THERAPY INITIAL EVALUATION ADULT - GENERAL OBSERVATIONS, REHAB EVAL
Pt. encountered semifowler in bed in NAD with +IV lock and tele monitor, w/ c/o of headache, agreeable to OT IE. Pt. left seated in b/s chair with chair alarm, IV lock, and tele monitor with call bell within reach. Pt reported decreased headache pain end of session. OT verbally spoke with MD Griffin ([id number]) and pt. is cleared for OOB activities.

## 2020-10-15 NOTE — CONSULT NOTE ADULT - SUBJECTIVE AND OBJECTIVE BOX
PEDRITO DIAZ  MRN-[id number]  55y Male        Patient is a 55y old  Male who presents with a chief complaint of amaurosis fugax (15 Oct 2020 10:14)    HEALTH ISSUES - R/O PROBLEM Dx:    HPI:  54 yo m PMH DM comes to the ED reporting h/o sudden onset transient visual loss.  at 8:30am, pt was watching TV and had sudden painless vision loss x seconds. pt states he felt shaky/anxious after. pt states vision significantly improved after/with glasses but not baseline.   Since then his vision has been blurry.  Vision does not improve when he covers one eye and both eyes are blurry.  He has been complaining of increased urination at night where he may wake up 4-5 time per night to urinate.  He admits to right shoulder pain due too injury a few months ago.  He also has been having numbness to the tips of the fingers in his left hand.  Admits to severe stress in his family life currently more then he can deal with.    In the ED:  CT head and CTA head and neck negative  but persistent left UE numbness and blurry vision; seen by Neuro and recommended stroke unit admission and workup   (14 Oct 2020 18:09)    PAST MEDICAL & SURGICAL HISTORY:  DM (diabetes mellitus)      MEDICATIONS  (STANDING):  amLODIPine   Tablet 5 milliGRAM(s) Oral daily  aspirin  chewable 81 milliGRAM(s) Oral daily  atorvastatin 80 milliGRAM(s) Oral at bedtime  baclofen 10 milliGRAM(s) Oral three times a day  dextrose 5%. 1000 milliLiter(s) (50 mL/Hr) IV Continuous <Continuous>  dextrose 50% Injectable 12.5 Gram(s) IV Push once  dextrose 50% Injectable 25 Gram(s) IV Push once  dextrose 50% Injectable 25 Gram(s) IV Push once  enoxaparin Injectable 40 milliGRAM(s) SubCutaneous at bedtime  influenza   Vaccine 0.5 milliLiter(s) IntraMuscular once  insulin glargine Injectable (LANTUS) 15 Unit(s) SubCutaneous at bedtime  insulin lispro (HumaLOG) corrective regimen sliding scale   SubCutaneous three times a day before meals  insulin lispro Injectable (HumaLOG) 5 Unit(s) SubCutaneous three times a day before meals    MEDICATIONS  (PRN):  dextrose 40% Gel 15 Gram(s) Oral once PRN Blood Glucose LESS THAN 70 milliGRAM(s)/deciliter  glucagon  Injectable 1 milliGRAM(s) IntraMuscular once PRN Glucose LESS THAN 70 milligrams/deciliter    Allergies    No Known Allergies    Intolerances      HEALTH ISSUES - PROBLEM Dx:    Below is a summary of my exam and findings    from the  chart notes in [facility]        PUSHPA: ~1yr  POHx: negative  FOHx: Non-contributory    Extended HPI    Blurred Vision OU. Onset: sudden. Duration of Problem: yesterday 8:30am. Course: pt was working on the computer and watching TV and developed a sudden onset of feelng crossed eye and seeing a thousand x's and a grey screen. lasted about 3 seconds with resolution. Vision returned back with current wear (OTC +1.50 which pt uses for distance). Does not some overlap in the vision which does not resolve with eye cover. Pt states BS usually in the 120's but yesterday after this eposide BS was 300. Has noted significant urination frequency over 1 month. Other: pt has had CT of head. CTA of head and neck and MRI. BS on presentation to the ED was 242.      Ocular Medications: none        EXTERNAL:    VISUAL ACUITY:       RIGHT EYE: sc: 20/50-                          LEFT EYE: sc: 20/400    MANIFEST:     OD +4.25-2.00 x 095 - 20/20-  OS +2.50-1.50 x 65 - 20/20-                    NEURO TESTING  EXTRAOCULAR MOVEMENTS: full OU; no dipoplia illicted; DVP 2BD in phoropter. Jennings 3 step not indicative of an isolated 4th nerve palsy  PUPILS: PERRl; no APD  VFc: FTFC OU    ANTERIOR SEGMENT EVALUATION: :    LIDS/ADENEXA: clear  CONJUNCTIVA/SCLERA: clear  CORNEA: clear  ANTERIOR CHAMBER:  d&q  IRIS/PUPIL: flat; no NVI OU  LENS/VITREOUS: NS OU    INTRAOCULAR PRESSURE:   OD:  18mmHg  OS: 18mmHg  @ 3:30pm    POSTERIOR SEGMENT EVALUATION  DFE: 1% Tropicamide, 2.5 % Phenylephrine OU    OPTIC NERVE: c/d OD: 0.5 OS 0.4  MACULA: no edema  A/V: normal caliber  FUNDUS/PERIPHERY: no holes tears

## 2020-10-15 NOTE — OCCUPATIONAL THERAPY INITIAL EVALUATION ADULT - GROSSLY INTACT, SENSORY
Grossly Intact/Pt reports +pins and needles last night in the tips of Left D1-D5, however has significantly improved this morning. ,DirectAddress_Unknown

## 2020-10-15 NOTE — PHYSICAL THERAPY INITIAL EVALUATION ADULT - GENERAL OBSERVATIONS, REHAB EVAL
Pt was seen from 9:07-9:30 for PT IE. Pt was rec'd in b/s chair, NAD, +tele, agreeable to participate in PT.

## 2020-10-15 NOTE — CONSULT NOTE ADULT - SUBJECTIVE AND OBJECTIVE BOX
Patient is a 55y old  Male who presents with a chief complaint of amaurosis fugax (15 Oct 2020 17:14)      HPI:  56 yo m PMH DM comes to the ED reporting h/o sudden onset transient visual loss.  at 8:30am, pt was watching TV and had sudden painless vision loss x seconds. pt states he felt shaky/anxious after. pt states vision significantly improved after/with glasses but not baseline.   Since then his vision has been blurry.  Vision does not improve when he covers one eye and both eyes are blurry.  He has been complaining of increased urination at night where he may wake up 4-5 time per night to urinate.  He admits to right shoulder pain due too injury a few months ago.  He also has been having numbness to the tips of the fingers in his left hand.  Admits to severe stress in his family life currently more then he can deal with.    In the ED:  CT head and CTA head and neck negative  but persistent left UE numbness and blurry vision; seen by Neuro and recommended stroke unit admission and workup   (14 Oct 2020 18:09)      PAST MEDICAL & SURGICAL HISTORY:  DM (diabetes mellitus)        PREVIOUS DIAGNOSTIC TESTING:      ECHO  FINDINGS:    STRESS  FINDINGS:    CATHETERIZATION  FINDINGS:    MEDICATIONS  (STANDING):  amLODIPine   Tablet 5 milliGRAM(s) Oral daily  aspirin  chewable 81 milliGRAM(s) Oral daily  atorvastatin 80 milliGRAM(s) Oral at bedtime  baclofen 10 milliGRAM(s) Oral three times a day  dextrose 5%. 1000 milliLiter(s) (50 mL/Hr) IV Continuous <Continuous>  dextrose 50% Injectable 12.5 Gram(s) IV Push once  dextrose 50% Injectable 25 Gram(s) IV Push once  dextrose 50% Injectable 25 Gram(s) IV Push once  enoxaparin Injectable 40 milliGRAM(s) SubCutaneous at bedtime  influenza   Vaccine 0.5 milliLiter(s) IntraMuscular once  insulin glargine Injectable (LANTUS) 15 Unit(s) SubCutaneous at bedtime  insulin lispro (HumaLOG) corrective regimen sliding scale   SubCutaneous three times a day before meals  insulin lispro Injectable (HumaLOG) 5 Unit(s) SubCutaneous three times a day before meals    MEDICATIONS  (PRN):  dextrose 40% Gel 15 Gram(s) Oral once PRN Blood Glucose LESS THAN 70 milliGRAM(s)/deciliter  glucagon  Injectable 1 milliGRAM(s) IntraMuscular once PRN Glucose LESS THAN 70 milligrams/deciliter      FAMILY HISTORY:      SOCIAL HISTORY:  CIGARETTES:    ALCOHOL:    REVIEW OF SYSTEMS:  CONSTITUTIONAL: No fever, weight loss, or fatigue  NECK: No pain or stiffness  RESPIRATORY: No cough, wheezing, chills or hemoptysis; No shortness of breath  CARDIOVASCULAR: No chest pain, palpitations, dizziness, or leg swelling  GASTROINTESTINAL: No abdominal or epigastric pain. No nausea, vomiting, or hematemesis; No diarrhea or constipation. No melena or hematochezia.  GENITOURINARY: No dysuria, frequency, hematuria, or incontinence  NEUROLOGICAL: No headaches, memory loss, loss of strength, numbness, or tremors  SKIN: No itching, burning, rashes, or lesions   ENDOCRINE: No heat or cold intolerance; No hair loss  MUSCULOSKELETAL: No joint pain or swelling; No muscle, back, or extremity pain  HEME/LYMPH: No easy bruising, or bleeding gums          Vital Signs Last 24 Hrs  T(C): 36.4 (15 Oct 2020 13:09), Max: 37.1 (14 Oct 2020 20:24)  T(F): 97.5 (15 Oct 2020 13:09), Max: 98.7 (14 Oct 2020 20:24)  HR: 98 (15 Oct 2020 13:09) (83 - 98)  BP: 141/83 (15 Oct 2020 13:09) (124/62 - 184/92)  BP(mean): --  RR: 18 (15 Oct 2020 13:09) (18 - 18)  SpO2: 98% (14 Oct 2020 20:24) (98% - 98%)        PHYSICAL EXAM:  GENERAL: NAD, well-groomed, well-developed  HEAD:  Atraumatic, Normocephalic  NECK: Supple, No JVD, Normal thyroid  NERVOUS SYSTEM:  Alert & Oriented X3, Good concentration  CHEST/LUNG: Clear to percussion bilaterally; No rales, rhonchi, wheezing, or rubs  HEART: Regular rate and rhythm; No murmurs, rubs, or gallops  ABDOMEN: Soft, Nontender, Nondistended; Bowel sounds present  EXTREMITIES:  2+ Peripheral Pulses, No clubbing, cyanosis, or edema  SKIN: No rashes or lesions    INTERPRETATION OF TELEMETRY:    ECG:    I&O's Detail      LABS:                        15.4   6.88  )-----------( 192      ( 15 Oct 2020 05:49 )             44.6     10-15    140  |  104  |  16  ----------------------------<  146<H>  4.6   |  25  |  0.9    Ca    9.5      15 Oct 2020 05:49    TPro  6.5  /  Alb  4.1  /  TBili  0.7  /  DBili  x   /  AST  30  /  ALT  43<H>  /  AlkPhos  39  10-15    CARDIAC MARKERS ( 14 Oct 2020 20:00 )  x     / <0.01 ng/mL / x     / x     / x      CARDIAC MARKERS ( 14 Oct 2020 12:33 )  x     / <0.01 ng/mL / x     / x     / x              I&O's Summary      RADIOLOGY & ADDITIONAL STUDIES:

## 2020-10-15 NOTE — OCCUPATIONAL THERAPY INITIAL EVALUATION ADULT - FINE MOTOR COORDINATION EXAM
MEDICARE WELLNESS VISIT NOTE      History of Present Illness:   Dre Marquis presents for her Subsequent Annual Medicare Wellness Visit.   She has complaints or concerns which include recurrence of her lumbar spine pain and also left sided cervicalgia. She did see physiatry again and had injection which did nothing for the pain relief. MRI repeated showed disc disease above the previous level of surgery she tells me. Since her low back pain, she cannot sleep on her hip so she now has a sore neck. Feels it is from her positioning. She denies radicular symptoms, and states it is just the large muscles of the left side of her neck and shoulder.   She has history of diabetes. Her HGBA1C is well controlled. She has good BP today as well. FLP remains at good goal without complaints on statin therapy. LFT normal.       Patient Care Team:  MARTHA Flowers as PCP - General (Nurse Practitioner)  Santi Martin MD as General Surgeon (General Surgery)        Patient Active Problem List    Diagnosis Date Noted   • Type 2 diabetes mellitus with diabetic polyneuropathy, without long-term current use of insulin (CMS/[facility]) 05/16/2019     Priority: Low   • Trochanteric bursitis of right hip 04/25/2019     Priority: Low   • Diverticulitis 11/09/2018     Priority: Low   • Hemangioma of liver 04/25/2018     Priority: Low     Hypodensity noted in 1992 , patient at that time told they were hemangiomas. CT scan 2018 again abnormal with hypodensity.      • Diverticulitis of colon with perforation 02/06/2018     Priority: Low   • Pelvic abscess in female 02/02/2018     Priority: Low   • Diabetic peripheral neuropathy associated with type 2 diabetes mellitus (CMS/[facility]) 09/21/2017     Priority: Low   • Spondylosis of lumbar region without myelopathy or radiculopathy 09/20/2017     Priority: Low   • Peripheral neuropathy 09/20/2017     Priority: Low   • Cervical spondylosis with radiculopathy      Priority: Low     left thumb      • Carpal tunnel syndrome, bilateral      Priority: Low   • Type 2 diabetes mellitus treated without insulin (CMS/[facility])      Priority: Low   • Hyperlipidemia      Priority: Low   • Benign essential hypertension 09/15/2014     Priority: Low         Past Medical History:   Diagnosis Date   • Benign hemangioma 4/2007    benign cavernous   • Bursitis of hip, right    • Carpal tunnel syndrome, bilateral    • Cervical spondylosis with radiculopathy     left thumb   • Diabetes mellitus (CMS/[facility])    • Diverticulitis    • Essential (primary) hypertension    • Hx of acne rosacea    • Hx of adenomatous polyp of colon 2007    tubular adenoma   • Hyperlipidemia    • Right lumbar radiculopathy     L5-S1   • Sepsis (CMS/HCC) 2/2/2018   • Trigger finger          Past Surgical History:   Procedure Laterality Date   • Breast surgery  2000    lump removed, benign   • Colonoscopy  05/22/2018    repeat in 10 years   • Colonoscopy diagnostic  08/29/2012   • D and c     • Hysterectomy      ovaries intact   • Ir epidural injection  7/16/2014   • Lumbar fusion N/A 4/29/15    L4-S1 A/P decompression instr. fusion w/ vitoss and local bone and bone marrow asp.   • Tonsillectomy and adenoidectomy     • Trigger finger release  2016    Right hand, 2nd and 4th finger   • Trigger finger release  10/2019         Social History     Tobacco Use   • Smoking status: Never Smoker   • Smokeless tobacco: Never Used   Substance Use Topics   • Alcohol use: Yes     Frequency: Monthly or less     Drinks per session: 1 or 2     Binge frequency: Never     Comment: occ   • Drug use: No     Drug use:    Drug Use:    No              Family History   Problem Relation Age of Onset   • Heart disease Mother    • Diabetes Mother    • Stroke Mother    • High blood pressure Mother    • High cholesterol Mother    • Heart disease Father    • High blood pressure Father    • High cholesterol Father    • Stroke Sister    • High blood pressure Sister    • High cholesterol  Sister    • Heart disease Brother    • Stroke Brother    • Diabetes Brother    • High blood pressure Brother    • High cholesterol Brother    • Stroke Brother    • High blood pressure Brother    • High cholesterol Brother    • Cancer Brother         prostate   • Heart disease Brother    • High blood pressure Brother    • High cholesterol Brother        Current Outpatient Medications   Medication Sig Dispense Refill   • metFORMIN (GLUCOPHAGE-XR) 500 MG 24 hr tablet TAKE TWO TABLETS BY MOUTH DAILY WITH BREAKFAST 180 tablet 1   • valsartan (DIOVAN) 80 MG tablet Take 1 tablet by mouth daily. 90 tablet 3   • hydrochlorothiazide (MICROZIDE) 12.5 MG capsule TAKE ONE CAPSULE BY MOUTH DAILY 90 capsule 2   • atorvastatin (LIPITOR) 20 MG tablet TAKE ONE TABLET BY MOUTH DAILY 90 tablet 2   • ASPIRIN ADULT LOW DOSE 81 MG EC tablet TAKE ONE TABLET BY MOUTH DAILY 90 tablet 3   • psyllium (METAMUCIL) 58.6 % powder for oral suspension Take 1 heaping tablespoon in 8 ounces of juice or water after breakfast and after supper on a daily basis to prevent constipation which is known to be associated with diverticulitis. 283 g 12   • triamcinolone (KENALOG) 0.5 % cream Apply topically 2 times daily. 30 g 1   • Ascorbic Acid (VITAMIN C) 500 MG tablet Take 500 mg by mouth 2 times daily.     • cholecalciferol 1000 units tablet Take 1,000 Units by mouth daily.     • Multiple Vitamins-Minerals (MULTIVITAMIN ADULT) Tab Take 1 tablet by mouth daily.     • CALCIUM CITRATE-VITAMIN D PO Take 1 tablet by mouth 2 times daily.        No current facility-administered medications for this visit.         The following items on the Medicare Health Risk Assessment were found to be positive  3.) During the past 4 weeks, how would you rate your health?:  Fair     4.) During the past 4 weeks, what was the hardest physical activity you could do for at least 2 minutes?:  Light     6 a.) How many servings of Fruits and Vegetables do you have each day ( 1 serving  = 1 piece of fruit, 1/2 cup fruits or vegetables):  1 per day     7.) Have you had a fall two or more times in the past year?:  Yes         Vision and hearing screens: not performed    Advance Directive:   The patient has the following documents:  No Advance Directives on file. Patient offered documents.    Cognitive Assessment: no evidence of cognitive dysfunction by direct observation    Recent PHQ 2/9 Score    PHQ 2:  Date Adult PHQ 2 Score   10/28/2019 0       PHQ 9:       DEPRESSION ASSESSMENT/PLAN:  Depression screening is negative no further plan needed.     Body mass index is 24.53 kg/m².    BMI ASSESSMENT/PLAN:  Patient BMI is within normal range.     Needed Screening/Treatment:   Annual mammogram  and Immunizations reviewed and patient needs: Influenza  Needed follow up:  None    ------------------------------------------------------------------------    REVIEW OF SYSTEMS: reviewed with patient.     PHYSICAL EXAM:    Visit Vitals  /68 (BP Location: LUE - Left upper extremity, Patient Position: Sitting, Cuff Size: Regular)   Pulse 64   Ht 5' 2.5\" (1.588 m)   Wt 61.8 kg   BMI 24.53 kg/m²      GENERAL APPEARANCE: Appears stated age, is in no apparent distress, is well developed and well nourished.  SKIN: Normal color for ethnicity, normal texture, good turgor, no skin rashes, no atypical-appearing skin lesions, no bruises. Her right great toe nail has changes of fungal infection without hypertrophy.   LYMPH NODES: No cervical, supraclavicular, axillary or inguinal adenopathy.   HEAD: Normocephalic.  EYES: Pupils are equal and reactive to light and accommodation, extraocular movements are full, sclerae and conjunctivae are normal, lids and lashes are normal.  Wearing glasses  EARS: Pinnae and external ears are normal bilaterally, external auditory canals are normal, tympanic membranes are normal,  there is no tragal tenderness, auditory acuity is grossly intact.    NOSE: External nose is normal to  inspection, no septal deviation.  MOUTH/THROAT: Tongue is midline and appears normal, oropharynx appears normal, soft palate and uvula are normal, oral mucosa is normal.  Dentition good repair.  NECK: Neck is supple, no thyromegaly, trachea is midline.  CHEST: Configuration normal, nontender to palpation, respiratory effort is not labored.  BREASTS: Breasts symmetric, skin shows no dimpling or induration, no nipple changes, no nipple discharge, no palpable nodules, no breast discomfort.  LUNGS: Lungs are clear to auscultation with normal inspiratory/expiratory sounds, no rales, no rhonchi, no wheezes.  CARDIOVASCULAR: Normal point of maximal impulse, normal rate and rhythm, no palpable heaves or thrills, S1 and S2 normal, no S3 or S4, no murmurs, no extra heart sounds.   No carotid or abdominal bruits.  ABDOMEN: Abdomen is soft, normal active bowel sounds, nontender, without masses, without hepatomegaly or splenomegaly, no pulsatile masses.  BACK: Inspection shows normal curvature, no discomfort to palpation of the midline, no costovertebral angle discomfort to palpation. Cervicalgia pain palpable along left trapezius muscle and scaphoid area.   EXTREMITIES: No clubbing, no cyanosis, no edema, normal muscle tone and development bilaterally.  NEUROLOGIC:  Alert, appropriate, oriented x 3.  Speech fluent. Cranial nerves 2-12 intact, motor strength intact and symmetric  PSYCHIATRIC:  Affect appropriate, insight fair, mood good.        Medicare annual wellness visit, subsequent  (primary encounter diagnosis)  Comment:  reviewed with patient. Vaccines updated today.   Plan: MWV 1 year    Type 2 diabetes mellitus with diabetic polyneuropathy, without long-term current use of insulin (CMS/[facility])  Comment: at goal.   Plan: OFFICE/OUTPT VISIT EST LEVEL IV, COMPREHENSIVE         METABOLIC PANEL, GLYCOHEMOGLOBIN, MICROALBUMIN         URINE RANDOM        Six months.     Benign essential hypertension  Comment: at goal. Valsartan  cost prohibitive. Losartan not on recall for patient any longer. Will switch back.   Plan: OFFICE/OUTPT VISIT EST LEVEL IV, losartan         (COZAAR) 25 MG tablet        1 po daily.     Spondylosis of lumbar region without myelopathy or radiculopathy  Comment: chronic DDD now having symptoms again.   Plan: OFFICE/OUTPT VISIT EST LEVEL IV        Follow with physiatry.     Mixed hyperlipidemia  Comment:   The ASCVD Risk score (Natividad MOONEY Jr., et al., 2013) failed to calculate for the following reasons:    The valid total cholesterol range is 130 to 320 mg/dL    Plan: OFFICE/OUTPT VISIT EST LEVEL IV, LIPID PANEL         WITH REFLEX        Continue moderate dose statin. FLP six months.     Encounter for screening mammogram for malignant neoplasm of breast  Comment: due 2019  Plan: MAMMO SCREENING BILATERAL            Cervicalgia  Comment: likely muscular due to positioning   Plan: SERVICE TO PHYSICAL THERAPY        To assist, evaluate and treat.     Fungal infection of toenail  Comment: noted on physical exam right great toe nail, mild.   Plan: terbinafine (LAMISIL) 250 MG tablet        1 po daily. Stop Atorvastatin while taking this medication.     Need for vaccination  Comment: due today.   Plan: INFLUENZA ENHANCED HIGH DOSE SPLIT PRES FREE         VACC, IM (FLUZONE-HIGH DOSE), PNEUMOCOCCAL         POLYSACCHARIDE ADULT VACC, IM/SQ (PNEUMOVAX 23)        given in clinic.       See Patient Instructions section.   Return in about 1 year (around 10/28/2020) for Medicare Wellness Visit.   Right UE/Left UE

## 2020-10-15 NOTE — PROGRESS NOTE ADULT - ASSESSMENT
54 y/o M with PMH of DM presented with sudden-onset B/L painless transient vision loss which improved without intervention. Pt is admitted for stroke w/u.     #Sudden-onset transient painless vision loss; r/o ischemic stroke:  ·	B/L transient vision loss, improved on its own   ·	no visual or neuro deficits on exam   ·	Aspirin 325mg x1 then 81mg QD, give atorvastatin 80mg QD  ·	trops neg x2   ·	Neuro recs noted: ophtho eval, MRI head, echo   ·	CTH and CTA head & neck neg   ·	MRI brain w/o ELLIOTT (pt unable to have MRI yesterday due to claustrophobia, agreeable to try again today), f/u   ·	Echo, f/u   ·	Ophthalmology C/L, f/u   ·	Cardio C/L for TW inversion in II, aVF (new), V4-V6 (old)   ·	possible D/C tmrw pending MRI and Echo results     # DM:  ·	start on lantus/lispro/ss    # HTN:   ·	amlodipine 5mg PO qd     #Misc:   Diet: Carb consistent   DVT ppx: Lovenox 40   GI ppx: none   Activity: IAT, seen by PT/OT    56 y/o M with PMH of DM presented with sudden-onset B/L painless transient vision loss which improved without intervention. Pt is admitted for stroke w/u.     #Sudden-onset transient painless vision loss; r/o ischemic stroke:  ·	B/L transient vision loss, improved on its own   ·	no visual or neuro deficits on exam   ·	Aspirin 325mg x1 then 81mg QD, give atorvastatin 80mg QD  ·	trops neg x2   ·	Neuro recs noted: ophtho eval, MRI head, echo   ·	CTH and CTA head & neck neg   ·	MRI brain w/o ELLIOTT (pt unable to have MRI yesterday due to claustrophobia- getting MRI today, f/u result  ·	f/u Echo  ·	Ophthalmology C/L, f/u   ·	Cardio C/L for TW inversion in II, aVF (new), V4-V6 (old)   ·	possible D/C tmrw pending MRI and Echo results     # DM:  ·	start on lantus/lispro/ss    # HTN:   ·	amlodipine 5mg PO qd     #Misc:   Diet: Carb consistent   DVT ppx: Lovenox 40   GI ppx: none   Activity: IAT, seen by PT/OT   FULL CODE  #Dispo: pending MRI results and optho consult 54 y/o M with PMH of DM presented with sudden-onset B/L painless transient vision loss which improved without intervention. Pt is admitted for stroke w/u.     #Sudden-onset transient painless vision loss; r/o ischemic stroke:  ·	B/L transient vision loss, improved on its own   ·	no visual or neuro deficits on exam   ·	Aspirin 325mg x1 then 81mg QD, give atorvastatin 80mg QD  ·	trops neg x2   ·	Neuro recs noted: ophtho eval, MRI head, echo   ·	CTH and CTA head & neck neg   ·	MRI brain w/o ELLIOTT: neg, chronic microvascular changes  ·	f/u Echo  ·	Ophthalmology C/L, f/u   ·	Cardio C/L for TW inversion in II, aVF (new), V4-V6 (old)   ·	possible D/C tmrw pending MRI and Echo results     # DM:  ·	start on lantus/lispro/ss    # HTN:   ·	amlodipine 5mg PO qd     #Misc:   Diet: Carb consistent   DVT ppx: Lovenox 40   GI ppx: none   Activity: IAT, seen by PT/OT   FULL CODE  #Dispo: pending MRI results and optho consult

## 2020-10-15 NOTE — CONSULT NOTE ADULT - ASSESSMENT
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   Posterior Vitreous Detachment OU.   Ocular Migraine OU, Not Intractable. Glaucoma Suspect,   Low Risk (Optic Nerve) OU.   Nuclear Sclerosis OU.    Discussion   Mr JASON presents with an "overlap" (horizontal) in his vision, and a kaleidoscope effect temporally OU, no history of migraine. The kaleidoscope effect may be from ocular migraine, there are no retinal lesions to account for this, there is no diabetic retinopathy in either eye, his overlap on vision is likely related to his poor control of his diabetes causing a widely fluctuating refractive error and changes in his prescription for glasses as his glucose fluctuates. Will examine again after glucose is controlled and prescribe glasses at that time and a yearly exam for his diabetes.    FOLLOW UP: Dr.Vincent Montalvo 1 Year - Comprehensive Exam OU. Dr. Warner - Intermediate Exam refraction OU.

## 2020-10-16 ENCOUNTER — TRANSCRIPTION ENCOUNTER (OUTPATIENT)
Age: 55
End: 2020-10-16

## 2020-10-16 PROBLEM — E11.9 TYPE 2 DIABETES MELLITUS WITHOUT COMPLICATIONS: Chronic | Status: ACTIVE | Noted: 2020-10-14

## 2020-10-16 LAB
ALBUMIN SERPL ELPH-MCNC: 4.2 G/DL — SIGNIFICANT CHANGE UP (ref 3.5–5.2)
ALP SERPL-CCNC: 41 U/L — SIGNIFICANT CHANGE UP (ref 30–115)
ALT FLD-CCNC: 46 U/L — HIGH (ref 0–41)
ANION GAP SERPL CALC-SCNC: 9 MMOL/L — SIGNIFICANT CHANGE UP (ref 7–14)
AST SERPL-CCNC: 30 U/L — SIGNIFICANT CHANGE UP (ref 0–41)
BILIRUB SERPL-MCNC: 0.9 MG/DL — SIGNIFICANT CHANGE UP (ref 0.2–1.2)
BUN SERPL-MCNC: 15 MG/DL — SIGNIFICANT CHANGE UP (ref 10–20)
CALCIUM SERPL-MCNC: 9.6 MG/DL — SIGNIFICANT CHANGE UP (ref 8.5–10.1)
CHLORIDE SERPL-SCNC: 101 MMOL/L — SIGNIFICANT CHANGE UP (ref 98–110)
CO2 SERPL-SCNC: 28 MMOL/L — SIGNIFICANT CHANGE UP (ref 17–32)
CREAT SERPL-MCNC: 0.9 MG/DL — SIGNIFICANT CHANGE UP (ref 0.7–1.5)
GLUCOSE BLDC GLUCOMTR-MCNC: 119 MG/DL — HIGH (ref 70–99)
GLUCOSE BLDC GLUCOMTR-MCNC: 144 MG/DL — HIGH (ref 70–99)
GLUCOSE BLDC GLUCOMTR-MCNC: 154 MG/DL — HIGH (ref 70–99)
GLUCOSE BLDC GLUCOMTR-MCNC: 241 MG/DL — HIGH (ref 70–99)
GLUCOSE SERPL-MCNC: 152 MG/DL — HIGH (ref 70–99)
HCT VFR BLD CALC: 45.7 % — SIGNIFICANT CHANGE UP (ref 42–52)
HGB BLD-MCNC: 15.7 G/DL — SIGNIFICANT CHANGE UP (ref 14–18)
MAGNESIUM SERPL-MCNC: 2.2 MG/DL — SIGNIFICANT CHANGE UP (ref 1.8–2.4)
MCHC RBC-ENTMCNC: 30.7 PG — SIGNIFICANT CHANGE UP (ref 27–31)
MCHC RBC-ENTMCNC: 34.4 G/DL — SIGNIFICANT CHANGE UP (ref 32–37)
MCV RBC AUTO: 89.4 FL — SIGNIFICANT CHANGE UP (ref 80–94)
NRBC # BLD: 0 /100 WBCS — SIGNIFICANT CHANGE UP (ref 0–0)
PLATELET # BLD AUTO: 190 K/UL — SIGNIFICANT CHANGE UP (ref 130–400)
POTASSIUM SERPL-MCNC: 4.6 MMOL/L — SIGNIFICANT CHANGE UP (ref 3.5–5)
POTASSIUM SERPL-SCNC: 4.6 MMOL/L — SIGNIFICANT CHANGE UP (ref 3.5–5)
PROT SERPL-MCNC: 7 G/DL — SIGNIFICANT CHANGE UP (ref 6–8)
RBC # BLD: 5.11 M/UL — SIGNIFICANT CHANGE UP (ref 4.7–6.1)
RBC # FLD: 11.9 % — SIGNIFICANT CHANGE UP (ref 11.5–14.5)
SODIUM SERPL-SCNC: 138 MMOL/L — SIGNIFICANT CHANGE UP (ref 135–146)
WBC # BLD: 6.23 K/UL — SIGNIFICANT CHANGE UP (ref 4.8–10.8)
WBC # FLD AUTO: 6.23 K/UL — SIGNIFICANT CHANGE UP (ref 4.8–10.8)

## 2020-10-16 PROCEDURE — 99233 SBSQ HOSP IP/OBS HIGH 50: CPT

## 2020-10-16 PROCEDURE — 74177 CT ABD & PELVIS W/CONTRAST: CPT | Mod: 26

## 2020-10-16 PROCEDURE — 93306 TTE W/DOPPLER COMPLETE: CPT | Mod: 26

## 2020-10-16 RX ORDER — ASPIRIN/CALCIUM CARB/MAGNESIUM 324 MG
1 TABLET ORAL
Qty: 0 | Refills: 0 | DISCHARGE
Start: 2020-10-16

## 2020-10-16 RX ORDER — MELOXICAM 15 MG/1
1 TABLET ORAL
Qty: 0 | Refills: 0 | DISCHARGE

## 2020-10-16 RX ORDER — ATORVASTATIN CALCIUM 80 MG/1
1 TABLET, FILM COATED ORAL
Qty: 30 | Refills: 0
Start: 2020-10-16 | End: 2020-11-14

## 2020-10-16 RX ORDER — ACETAMINOPHEN WITH CODEINE 300MG-30MG
1 TABLET ORAL
Qty: 0 | Refills: 0 | DISCHARGE

## 2020-10-16 RX ORDER — SODIUM CHLORIDE 9 MG/ML
1000 INJECTION INTRAMUSCULAR; INTRAVENOUS; SUBCUTANEOUS
Refills: 0 | Status: DISCONTINUED | OUTPATIENT
Start: 2020-10-16 | End: 2020-10-17

## 2020-10-16 RX ORDER — IOHEXOL 300 MG/ML
30 INJECTION, SOLUTION INTRAVENOUS ONCE
Refills: 0 | Status: COMPLETED | OUTPATIENT
Start: 2020-10-16 | End: 2020-10-16

## 2020-10-16 RX ADMIN — Medication 5 UNIT(S): at 12:16

## 2020-10-16 RX ADMIN — Medication 5 UNIT(S): at 07:31

## 2020-10-16 RX ADMIN — INSULIN GLARGINE 15 UNIT(S): 100 INJECTION, SOLUTION SUBCUTANEOUS at 21:29

## 2020-10-16 RX ADMIN — AMLODIPINE BESYLATE 5 MILLIGRAM(S): 2.5 TABLET ORAL at 05:53

## 2020-10-16 RX ADMIN — ATORVASTATIN CALCIUM 80 MILLIGRAM(S): 80 TABLET, FILM COATED ORAL at 21:29

## 2020-10-16 RX ADMIN — ENOXAPARIN SODIUM 40 MILLIGRAM(S): 100 INJECTION SUBCUTANEOUS at 21:29

## 2020-10-16 RX ADMIN — SODIUM CHLORIDE 50 MILLILITER(S): 9 INJECTION INTRAMUSCULAR; INTRAVENOUS; SUBCUTANEOUS at 16:25

## 2020-10-16 RX ADMIN — IOHEXOL 30 MILLILITER(S): 300 INJECTION, SOLUTION INTRAVENOUS at 15:28

## 2020-10-16 RX ADMIN — Medication 81 MILLIGRAM(S): at 12:15

## 2020-10-16 RX ADMIN — Medication 1: at 07:31

## 2020-10-16 NOTE — DISCHARGE NOTE PROVIDER - PROVIDER TOKENS
PROVIDER:[TOKEN:[[id number]],FOLLOWUP:[Routine]],PROVIDER:[TOKEN:[[id number]],FOLLOWUP:[1 month]],PROVIDER:[TOKEN:[[id number]],FOLLOWUP:[2 weeks]] PROVIDER:[TOKEN:[[id number]],FOLLOWUP:[Routine]],PROVIDER:[TOKEN:[[id number]],FOLLOWUP:[1 month]],PROVIDER:[TOKEN:[[id number]],FOLLOWUP:[2 weeks]],PROVIDER:[TOKEN:[[id number]],FOLLOWUP:[2 weeks]] PROVIDER:[TOKEN:[[id number]],FOLLOWUP:[Routine]],PROVIDER:[TOKEN:[[id number]],FOLLOWUP:[1 month]],PROVIDER:[TOKEN:[[id number]],FOLLOWUP:[2 weeks]],PROVIDER:[TOKEN:[[id number]]] PROVIDER:[TOKEN:[[id number]],FOLLOWUP:[Routine]],PROVIDER:[TOKEN:[[id number]],FOLLOWUP:[1 month]],PROVIDER:[TOKEN:[[id number]],FOLLOWUP:[2 weeks]],PROVIDER:[TOKEN:[[id number]]],PROVIDER:[TOKEN:[[id number]]]

## 2020-10-16 NOTE — DISCHARGE NOTE PROVIDER - HOSPITAL COURSE
54 yo m PMH DM comes to the ED reporting h/o sudden onset transient visual loss.  at 8:30am, pt was watching TV and had sudden painless vision loss x seconds. pt states he felt shaky/anxious after. pt states vision significantly improved after/with glasses but not baseline.   Since then his vision has been blurry.  Vision does not improve when he covers one eye and both eyes are blurry.  He has been complaining of increased urination at night where he may wake up 4-5 time per night to urinate.  He admits to right shoulder pain due too injury a few months ago.  He also has been having numbness to the tips of the fingers in his left hand.  Admits to severe stress in his family life currently more then he can deal with.    In the ED:  CT head and CTA head and neck negative  but persistent left UE numbness and blurry vision; seen by Neuro and recommended stroke unit admission and workup    54 y/o M with PMH of DM presented with sudden-onset B/L painless transient vision loss which improved without intervention. Pt is admitted for stroke w/u.     #Sudden-onset transient painless vision loss; r/o ischemic stroke:  B/L transient vision loss, improved on its own   no visual or neuro deficits on exam   Aspirin 325mg x1 then 81mg QD, give atorvastatin 80mg QD  trops neg x2   Neuro recs noted: ophtho eval, MRI head, echo   CTH and CTA head & neck neg, no acute infarcts   MRI brain w/o ELLIOTT: neg, chronic microvascular changes  Ophtho: blurry vision likely secondary to uncontrolled blood sugars, no retinopathy or retinal lesions, OP appts scheduled in 1 mo and annually for DM checks   Cardio: f/u Echo   possible D/C today after Echo results     # DM:  -insulin inpatient    # HTN:   amlodipine 5mg PO qd    56 yo m PMH DM comes to the ED reporting h/o sudden onset transient visual loss.  at 8:30am, pt was watching TV and had sudden painless vision loss x seconds. pt states he felt shaky/anxious after. pt states vision significantly improved after/with glasses but not baseline.   Since then his vision has been blurry.  Vision does not improve when he covers one eye and both eyes are blurry.  He has been complaining of increased urination at night where he may wake up 4-5 time per night to urinate.  He admits to right shoulder pain due too injury a few months ago.  He also has been having numbness to the tips of the fingers in his left hand.  Admits to severe stress in his family life currently more then he can deal with.    In the ED:  CT head and CTA head and neck negative  but persistent left UE numbness and blurry vision; seen by Neuro and recommended stroke unit admission and workup    54 y/o M with PMH of DM presented with sudden-onset B/L painless transient vision loss which improved without intervention. Pt is admitted for stroke w/u.     #Sudden-onset transient painless vision loss; r/o ischemic stroke:  B/L transient vision loss, improved on its own   no visual or neuro deficits on exam   Aspirin 325mg x1 then 81mg QD, give atorvastatin 80mg QD  trops neg x2   Neuro recs noted: ophtho eval, MRI head, echo   CTH and CTA head & neck neg, no acute infarcts   MRI brain w/o ELLIOTT: neg, chronic microvascular changes  Ophtho: blurry vision likely secondary to uncontrolled blood sugars, no retinopathy or retinal lesions, OP appts scheduled in 1 mo and annually for DM checks   Cardio: f/u Echo   possible D/C today after Echo results     # DM:  -insulin inpatient    # HTN:   amlodipine 5mg PO qd    54 yo m PMH DM comes to the ED reporting h/o sudden onset transient visual loss.  at 8:30am, pt was watching TV and had sudden painless vision loss x seconds. pt states he felt shaky/anxious after. pt states vision significantly improved after/with glasses but not baseline.   Since then his vision has been blurry.  Vision does not improve when he covers one eye and both eyes are blurry.  He has been complaining of increased urination at night where he may wake up 4-5 time per night to urinate.  He admits to right shoulder pain due too injury a few months ago.  He also has been having numbness to the tips of the fingers in his left hand.  Admits to severe stress in his family life currently more then he can deal with.    In the ED:  CT head and CTA head and neck negative  but persistent left UE numbness and blurry vision; seen by Neuro and recommended stroke unit admission and workup    56 y/o M with PMH of DM presented with sudden-onset B/L painless transient vision loss which improved without intervention. Pt is admitted for stroke w/u.     #Sudden-onset transient painless vision loss; r/o ischemic stroke:  B/L transient vision loss, improved on its own   no visual or neuro deficits on exam   Aspirin 325mg x1 then 81mg QD, give atorvastatin 80mg QD  trops neg x2   Neuro recs noted: ophtho eval, MRI head, echo   CTH and CTA head & neck neg, no acute infarcts   MRI brain w/o ELLIOTT: neg, chronic microvascular changes  Ophtho: blurry vision likely secondary to uncontrolled blood sugars, no retinopathy or retinal lesions, OP appts scheduled in 1 mo and annually for DM checks   -       echocardiogram was normal    # DM:  -insulin inpatient    # HTN:   amlodipine 5mg PO qd    54 yo m PMH DM comes to the ED reporting h/o sudden onset transient visual loss.  at 8:30am, pt was watching TV and had sudden painless vision loss x seconds. pt states he felt shaky/anxious after. pt states vision significantly improved after/with glasses but not baseline.   Since then his vision has been blurry.  Vision does not improve when he covers one eye and both eyes are blurry.  He has been complaining of increased urination at night where he may wake up 4-5 time per night to urinate.  He admits to right shoulder pain due too injury a few months ago.  He also has been having numbness to the tips of the fingers in his left hand.  Admits to severe stress in his family life currently more then he can deal with.    In the ED:  CT head and CTA head and neck negative  but persistent left UE numbness and blurry vision; seen by Neuro and recommended stroke unit admission and workup    54 y/o M with PMH of DM presented with sudden-onset B/L painless transient vision loss which improved without intervention. Pt is admitted for stroke w/u.     #Sudden-onset transient painless vision loss; r/o ischemic stroke:  B/L transient vision loss, improved on its own   no visual or neuro deficits on exam   Aspirin 325mg x1 then 81mg QD, give atorvastatin 80mg QD  trops neg x2   Neuro recs noted: ophtho eval, MRI head, echo   CTH and CTA head & neck neg, no acute infarcts   MRI brain w/o ELLIOTT: neg, chronic microvascular changes  Ophtho: blurry vision likely secondary to uncontrolled blood sugars, no retinopathy or retinal lesions, OP appts scheduled in 1 mo and annually for DM checks   -       echocardiogram was normal    # DM:  -insulin inpatient    # HTN:   amlodipine 5mg PO qd      Ct abd was normal --Dc home today

## 2020-10-16 NOTE — DISCHARGE NOTE PROVIDER - CARE PROVIDERS DIRECT ADDRESSES
,DirectAddress_Unknown,DirectAddress_Unknown,DirectAddress_Unknown ,DirectAddress_Unknown,DirectAddress_Unknown,DirectAddress_Unknown,[email or web address] ,DirectAddress_Unknown,DirectAddress_Unknown,DirectAddress_Unknown,[email or web address] ,DirectAddress_Unknown,DirectAddress_Unknown,DirectAddress_Unknown,[email or web address]

## 2020-10-16 NOTE — DISCHARGE NOTE PROVIDER - CARE PROVIDER_API CALL
Jun Montalvo  OPHTHALMOLOGY  [address] [place] [address]  Phone: [phone]  Fax: [phone]  Follow Up Time: Routine    Tadeo Warner  Ophthalmology  [address] [place] [address]  Phone: [phone]  Fax: [phone]  Follow Up Time: 1 month    Jun Clark  Cardiovascular Disease  [address] [place] [address]  Phone: [phone]  Fax: [phone]  Follow Up Time: 2 weeks   Jun Montalvo  OPHTHALMOLOGY  [address] [place] [address]  Phone: [phone]  Fax: [phone]  Follow Up Time: Routine    Tadeo Warner  Ophthalmology  [address] [place] [address]  Phone: [phone]  Fax: [phone]  Follow Up Time: 1 month    Jun Clark  Cardiovascular Disease  [address] [place] [address]  Phone: [phone]  Fax: [phone]  Follow Up Time: 2 weeks    Farhat Hoffman  EEG/EPILEPSY  [address] [facility], [address]  [place] [address]  Phone: [phone]  Fax: [phone]  Follow Up Time: 2 weeks   Jun Montalvo  OPHTHALMOLOGY  [address] [place] [address]  Phone: [phone]  Fax: [phone]  Follow Up Time: Routine    Tadeo Warner  Ophthalmology  [address] [place] [address]  Phone: [phone]  Fax: [phone]  Follow Up Time: 1 month    Jun Clark  Cardiovascular Disease  [address] [place] [address]  Phone: [phone]  Fax: [phone]  Follow Up Time: 2 weeks    Gordy Carrizales (DO)  Medicine  Physicians  [address] [place] [address]  Phone: [phone]  Fax: [phone]  Follow Up Time:    Jun Montalvo  OPHTHALMOLOGY  [address] [place] [address]  Phone: [phone]  Fax: [phone]  Follow Up Time: Routine    Tadeo Warner  Ophthalmology  [address] [place] [address]  Phone: [phone]  Fax: [phone]  Follow Up Time: 1 month    Jun Clark  Cardiovascular Disease  [address] [place] [address]  Phone: [phone]  Fax: [phone]  Follow Up Time: 2 weeks    Gordy Carrizales (DO)  Medicine  Physicians  [address] [place] [address]  Phone: [phone]  Fax: [phone]  Follow Up Time:     Pankaj Mckee  INTERNAL MEDICINE  [address] [place] [address]  Phone: [phone]  Fax: [phone]  Follow Up Time:

## 2020-10-16 NOTE — DISCHARGE NOTE PROVIDER - NSFOLLOWUPCLINICS_GEN_ALL_ED_FT
[facility]  Medicine  [address] [place], [place]   Phone: [phone]  Fax:   Follow Up Time:

## 2020-10-16 NOTE — SWALLOW BEDSIDE ASSESSMENT ADULT - SLP PERTINENT HISTORY OF CURRENT PROBLEM
56 y/o M with PMH of DM presented with sudden-onset B/L painless transient vision loss which improved to slight blurriness.  Adm for stroke w/u. CTH and CTA head & neck neg, no acute infarcts. MRI brain w/o ELLIOTT: neg, chronic microvascular changes. Ophtho: blurry vision likely secondary to uncontrolled blood sugars.  Pt reports that he had also been having some numbness in the fingers of his left hand, which has now resolved

## 2020-10-16 NOTE — PROGRESS NOTE ADULT - SUBJECTIVE AND OBJECTIVE BOX
DAILY PROGRESS NOTE  ===========================================================    Patient Information:  PEDRITO DIAZ  /  55y  /  Male  /  MRN#: [id number]    Hospital Day: 2d     |:::::::::::::::::::::::::::| SUBJECTIVE |:::::::::::::::::::::::::::|    OVERNIGHT EVENTS: None.   TODAY: Patient was seen today at bedside. Blurry vision mildly improving today. If echo normal will D/C today.     |:::::::::::::::::::::::::::| OBJECTIVE |:::::::::::::::::::::::::::|    VITAL SIGNS: Last 24 Hours  T(C): 35.7 (16 Oct 2020 05:36), Max: 36.4 (15 Oct 2020 13:09)  T(F): 96.2 (16 Oct 2020 05:36), Max: 97.5 (15 Oct 2020 13:09)  HR: 76 (16 Oct 2020 05:36) (76 - 98)  BP: 141/90 (16 Oct 2020 05:36) (141/83 - 145/85)  BP(mean): --  RR: 20 (16 Oct 2020 05:36) (18 - 20)  SpO2: --    10-15-20 @ 07:01  -  10-16-20 @ 07:00  --------------------------------------------------------  IN: 240 mL / OUT: 0 mL / NET: 240 mL    10-16-20 @ 07:01  -  10-16-20 @ 08:39  --------------------------------------------------------  IN: 210 mL / OUT: 0 mL / NET: 210 mL      PHYSICAL EXAM:  GENERAL:   Awake, alert; NAD.  HEENT:  Head NC/AT; Conjunctivae pink, Sclera anicteric; Oral mucosa moist. blurry vision improving   CARDIO:   Regular rate; Regular rhythm; S1 & S2.  RESP:   No rales, wheezing, or rhonchi appreciated.  GI:   Soft; NT/ND; BS; No guarding; No rebound tenderness.  EXT:   Strength UE 5/5; Strength LE 5/5; No edema.   SKIN:   Intact.    LAB RESULTS:                        15.7   6.23  )-----------( 190      ( 16 Oct 2020 05:30 )             45.7     10-16    138  |  101  |  15  ----------------------------<  152<H>  4.6   |  28  |  0.9    Ca    9.6      16 Oct 2020 05:30  Mg     2.2     10-16    TPro  7.0  /  Alb  4.2  /  TBili  0.9  /  DBili  x   /  AST  30  /  ALT  46<H>  /  AlkPhos  41  10-16            CARDIAC MARKERS ( 14 Oct 2020 20:00 )  x     / <0.01 ng/mL / x     / x     / x      CARDIAC MARKERS ( 14 Oct 2020 12:33 )  x     / <0.01 ng/mL / x     / x     / x          MICROBIOLOGY:    RADIOLOGY:    ALLERGIES: No Known Allergies      ===========================================================     DAILY PROGRESS NOTE  ===========================================================    Patient Information:  PEDRITO DIAZ  /  55y  /  Male  /  MRN#: [id number]    Hospital Day: 2d     |:::::::::::::::::::::::::::| SUBJECTIVE |:::::::::::::::::::::::::::|    OVERNIGHT EVENTS: None.   TODAY: Patient was seen today at bedside. Blurry vision mildly improving today. If echo normal will D/C today.     |:::::::::::::::::::::::::::| OBJECTIVE |:::::::::::::::::::::::::::|    VITAL SIGNS: Last 24 Hours  T(C): 35.7 (16 Oct 2020 05:36), Max: 36.4 (15 Oct 2020 13:09)  T(F): 96.2 (16 Oct 2020 05:36), Max: 97.5 (15 Oct 2020 13:09)  HR: 76 (16 Oct 2020 05:36) (76 - 98)  BP: 141/90 (16 Oct 2020 05:36) (141/83 - 145/85)  BP(mean): --  RR: 20 (16 Oct 2020 05:36) (18 - 20)  SpO2: --    10-15-20 @ 07:01  -  10-16-20 @ 07:00  --------------------------------------------------------  IN: 240 mL / OUT: 0 mL / NET: 240 mL    10-16-20 @ 07:01  -  10-16-20 @ 08:39  --------------------------------------------------------  IN: 210 mL / OUT: 0 mL / NET: 210 mL      PHYSICAL EXAM:  GENERAL:   Awake, alert; NAD.  HEENT:  Head NC/AT; Conjunctivae pink, Sclera anicteric; Oral mucosa moist. blurry vision improving   CARDIO:   Regular rate; Regular rhythm; S1 & S2.  RESP:   No rales, wheezing, or rhonchi appreciated.  GI:   Soft; NT/ND; BS; No guarding; No rebound tenderness.  EXT:   Strength UE 5/5; Strength LE 5/5; No edema.   SKIN:   Intact.    LAB RESULTS:                        15.7   6.23  )-----------( 190      ( 16 Oct 2020 05:30 )             45.7     10-16    138  |  101  |  15  ----------------------------<  152<H>  4.6   |  28  |  0.9    Ca    9.6      16 Oct 2020 05:30  Mg     2.2     10-16    TPro  7.0  /  Alb  4.2  /  TBili  0.9  /  DBili  x   /  AST  30  /  ALT  46<H>  /  AlkPhos  41  10-16    CARDIAC MARKERS ( 14 Oct 2020 20:00 )  x     / <0.01 ng/mL / x     / x     / x      CARDIAC MARKERS ( 14 Oct 2020 12:33 )  x     / <0.01 ng/mL / x     / x     / x          MICROBIOLOGY:    RADIOLOGY:  < from: MR Head No Cont (10.15.20 @ 12:16) >    IMPRESSION:    1.  No evidence of recent infarct or acute intracranial hemorrhage.    2.  Minimal chronic microvascular changes.    ALLERGIES: No Known Allergies      ===========================================================

## 2020-10-16 NOTE — PROGRESS NOTE ADULT - ASSESSMENT
blurriness of vision likely related to uncontrolled DM :   MRI brain neg for stroke   CTA head and neck neg for stenosis   on asa, lipitor will decrease to 40 mg qhs   seen by opthalmo and will need OPT follow up   OPT follow up with neuro       non specific t wave changes on EKG: seen by cardiology and recommended echo and if echo normal no need for further work up per cardiology. patient is asymptomatic and has neg troponins    DM: A1C is 7.1 would resume home metformin on discharge and to follow up as OPT with PMD     discharge pending echo   spent 32 min on discharge #blurriness of vision likely related to uncontrolled DM :   MRI brain neg for stroke   CTA head and neck neg for stenosis   on asa, lipitor will decrease to 40 mg qhs   seen by opthalmo and will need OPT follow up   OPT follow up with neuro     #Right sided abdomen pain worse when he eats. LFTs normal. patient has history of colostomy when he was 17 years old. will proceed with CT abd/pelvis with po and IV contrast     #non specific t wave changes on EKG: seen by cardiology and recommended echo showed mild to mod AS  patient is asymptomatic and has neg troponins. OPT follow up     #DM: A1C is 7.1 would resume home metformin on discharge but not to hold for atleast 40 hours after contrast which he will get with CT Abd       discharge pending CT abdomen

## 2020-10-16 NOTE — DISCHARGE NOTE PROVIDER - NSDCCPCAREPLAN_GEN_ALL_CORE_FT
PRINCIPAL DISCHARGE DIAGNOSIS  Diagnosis: Vision changes  Assessment and Plan of Treatment: Due to your symptoms you were admitted for further work up. During admission, neurology was consulted for possible stroke and recommended MRI, and put you on aspirin and lipitor. CT head and MRI came back negative. Ophthomology were consulted due to blurry vision and they concluded you vision changes were due to your diabetes. You diabetes need to be controlled better. A1C was found to 7.0. Lipid profile was normal except for elevated triglycerides. Please modify your diet and exercise for management of your diabetes. Echo was also done and showed       PRINCIPAL DISCHARGE DIAGNOSIS  Diagnosis: Vision changes  Assessment and Plan of Treatment: Due to your symptoms you were admitted for further work up. During admission, neurology was consulted for possible stroke and recommended MRI, and put you on aspirin and lipitor. CT head and MRI came back negative. Ophthomology were consulted due to blurry vision and they concluded you vision changes were due to your diabetes. You diabetes need to be controlled better. A1C was found to 7.0. Lipid profile was normal except for elevated triglycerides. Please modify your diet and exercise for management of your diabetes. Echo was also done and showed.. Please follow up with your cardiologist, optho and PCP       PRINCIPAL DISCHARGE DIAGNOSIS  Diagnosis: Vision changes  Assessment and Plan of Treatment: Due to your symptoms you were admitted for further work up. During admission, neurology was consulted for possible stroke and recommended MRI, and put you on aspirin and lipitor. CT head and MRI came back negative. Ophthomology were consulted due to blurry vision and they concluded you vision changes were due to your diabetes. You diabetes need to be controlled better. A1C was found to 7.0. Lipid profile was normal except for elevated triglycerides. Please modify your diet and exercise for management of your diabetes. Echo was also done and showed.. Please follow up with your cardiologist, optho and PCP      SECONDARY DISCHARGE DIAGNOSES  Diagnosis: Back pain  Assessment and Plan of Treatment: Due to your pain, we contacted pain managment and you will need to follow up with them outpatient     PRINCIPAL DISCHARGE DIAGNOSIS  Diagnosis: Vision changes  Assessment and Plan of Treatment: Due to your symptoms you were admitted for further work up. During admission, neurology was consulted for possible stroke and recommended MRI, and put you on aspirin and lipitor. CT head and MRI came back negative. Ophthomology were consulted due to blurry vision and they concluded you vision changes were due to your diabetes. You diabetes need to be controlled better. A1C was found to 7.0. Lipid profile was normal except for elevated triglycerides. Please modify your diet and exercise for management of your diabetes. Echo was also done and showed.. Please follow up with your cardiologist, optho, neurology and PCP      SECONDARY DISCHARGE DIAGNOSES  Diagnosis: Back pain  Assessment and Plan of Treatment: Due to your pain, we contacted pain managment and you will need to follow up with them outpatient     PRINCIPAL DISCHARGE DIAGNOSIS  Diagnosis: Vision changes  Assessment and Plan of Treatment: Due to your symptoms you were admitted for further work up. During admission, neurology was consulted for possible stroke and recommended MRI, and put you on aspirin and lipitor. CT head and MRI came back negative. Ophthomology were consulted due to blurry vision and they concluded you vision changes were due to your diabetes. You diabetes need to be controlled better. A1C was found to 7.0. Lipid profile was normal except for elevated triglycerides. Please modify your diet and exercise for management of your diabetes. Echo was also done and showed.. Please follow up with your cardiologist, optho, neurology and PCP      SECONDARY DISCHARGE DIAGNOSES  Diagnosis: Back pain  Assessment and Plan of Treatment: Due to your pain, we contacted pain managment and you will need to follow up with them outpatient. You will have an appt with Dr. Warren, pain management doctor, on Thursday 22nd at 10:45 am     PRINCIPAL DISCHARGE DIAGNOSIS  Diagnosis: Vision changes  Assessment and Plan of Treatment: Due to your symptoms you were admitted for further work up. During admission, neurology was consulted for possible stroke and recommended MRI, and put you on aspirin and lipitor. CT head and MRI came back negative. Ophthomology were consulted due to blurry vision and they concluded you vision changes were due to your diabetes. You diabetes need to be controlled better. A1C was found to 7.0. Lipid profile was normal except for elevated triglycerides. Please modify your diet and exercise for management of your diabetes. Echo was also done and was nomral. Please follow up with your cardiologist, optho, neurology and PCP.      SECONDARY DISCHARGE DIAGNOSES  Diagnosis: Back pain  Assessment and Plan of Treatment: Due to your pain, we contacted pain managment and you will need to follow up with them outpatient. You will have an appt with Dr. Warren, pain management doctor, on Thursday 22nd at 10:45 am

## 2020-10-16 NOTE — DISCHARGE NOTE PROVIDER - NSDCMRMEDTOKEN_GEN_ALL_CORE_FT
amLODIPine 5 mg oral tablet: 1 tab(s) orally once a day  aspirin 81 mg oral tablet, chewable: 1 tab(s) orally once a day  baclofen 10 mg oral tablet: 1 tab(s) orally 3 times a day  metFORMIN 850 mg oral tablet: 1 tab(s) orally 2 times a day   amLODIPine 5 mg oral tablet: 1 tab(s) orally once a day  aspirin 81 mg oral tablet, chewable: 1 tab(s) orally once a day  baclofen 10 mg oral tablet: 1 tab(s) orally 3 times a day  Lipitor 40 mg oral tablet: 1 tab(s) orally once a day  metFORMIN 850 mg oral tablet: 1 tab(s) orally 2 times a day   amLODIPine 5 mg oral tablet: 1 tab(s) orally once a day  aspirin 81 mg oral tablet, chewable: 1 tab(s) orally once a day  metFORMIN 850 mg oral tablet: 1 tab(s) orally 2 times a day

## 2020-10-16 NOTE — PROGRESS NOTE ADULT - ASSESSMENT
56 y/o M with PMH of DM presented with sudden-onset B/L painless transient vision loss which improved without intervention. Pt is admitted for stroke w/u.     #Sudden-onset transient painless vision loss; r/o ischemic stroke:  ·	B/L transient vision loss, improved on its own   ·	no visual or neuro deficits on exam   ·	Aspirin 325mg x1 then 81mg QD, give atorvastatin 80mg QD  ·	trops neg x2   ·	Neuro recs noted: ophtho eval, MRI head, echo   ·	CTH and CTA head & neck neg, no acute infarcts   ·	MRI brain w/o ELLIOTT: neg, chronic microvascular changes  ·	Ophtho: blurry vision likely secondary to uncontrolled blood sugars, no retinopathy or retinal lesions, OP appts scheduled in 1 mo and annually for DM checks   ·	Cardio: f/u Echo   ·	possible D/C today Echo results     # DM:  ·	on Lantus 15U, Lispro 5U, SS    # HTN:   ·	amlodipine 5mg PO qd     #Misc:   Diet: Carb consistent   DVT ppx: Lovenox 40   GI ppx: none   Activity: IAT, seen by PT/OT   FULL CODE  Dispo: pending echo results 54 y/o M with PMH of DM presented with sudden-onset B/L painless transient vision loss which improved without intervention. Pt is admitted for stroke w/u.     #Sudden-onset transient painless vision loss; r/o ischemic stroke:  ·	B/L transient vision loss, improved on its own   ·	no visual or neuro deficits on exam   ·	Aspirin 325mg x1 then 81mg QD, give atorvastatin 80mg QD  ·	trops neg x2   ·	Neuro recs noted: ophtho eval, MRI head, echo   ·	CTH and CTA head & neck neg, no acute infarcts   ·	MRI brain w/o ELLIOTT: neg, chronic microvascular changes  ·	Ophtho: blurry vision likely secondary to uncontrolled blood sugars, no retinopathy or retinal lesions, OP appts scheduled in 1 mo and annually for DM checks   ·	Cardio: f/u Echo   ·	possible D/C today after Echo results     # DM:  ·	on Lantus 15U, Lispro 5U, SS    # HTN:   ·	amlodipine 5mg PO qd     #Misc:   Diet: Carb consistent   DVT ppx: Lovenox 40   GI ppx: none   Activity: IAT, seen by PT/OT   FULL CODE  Dispo: pending echo results

## 2020-10-16 NOTE — PROGRESS NOTE ADULT - SUBJECTIVE AND OBJECTIVE BOX
blurry vision mildly better today per patient   no chest pain and no sob     Vital Signs Last 24 Hrs  T(C): 35.7 (16 Oct 2020 05:36), Max: 36.4 (15 Oct 2020 13:09)  T(F): 96.2 (16 Oct 2020 05:36), Max: 97.5 (15 Oct 2020 13:09)  HR: 76 (16 Oct 2020 05:36) (76 - 98)  BP: 141/90 (16 Oct 2020 05:36) (141/83 - 145/85)  BP(mean): --  RR: 20 (16 Oct 2020 05:36) (18 - 20)  SpO2: --    PHYSICAL EXAM:  GENERAL: NAD  Pulm Clear to auscultation bilaterally; No wheeze  CV Regular rate and rhythm; No murmurs, rubs, or gallops  GI: Soft, Nontender, Nondistended; Bowel sounds present  EXTREMITIES:  2+ Peripheral Pulses, No clubbing, cyanosis, or edema  PSYCH: AAOx3  NEUROLOGY: non-focal  SKIN: No rashes                          15.7   6.23  )-----------( 190      ( 16 Oct 2020 05:30 )             45.7     10-16    138  |  101  |  15  ----------------------------<  152<H>  4.6   |  28  |  0.9    Ca    9.6      16 Oct 2020 05:30  Mg     2.2     10-16    TPro  7.0  /  Alb  4.2  /  TBili  0.9  /  DBili  x   /  AST  30  /  ALT  46<H>  /  AlkPhos  41  10-16    LIVER FUNCTIONS - ( 16 Oct 2020 05:30 )  Alb: 4.2 g/dL / Pro: 7.0 g/dL / ALK PHOS: 41 U/L / ALT: 46 U/L / AST: 30 U/L / GGT: x             CARDIAC MARKERS ( 14 Oct 2020 20:00 )  x     / <0.01 ng/mL / x     / x     / x      CARDIAC MARKERS ( 14 Oct 2020 12:33 )  x     / <0.01 ng/mL / x     / x     / x        MEDICATIONS  (STANDING):  amLODIPine   Tablet 5 milliGRAM(s) Oral daily  aspirin  chewable 81 milliGRAM(s) Oral daily  atorvastatin 80 milliGRAM(s) Oral at bedtime  baclofen 10 milliGRAM(s) Oral three times a day  dextrose 5%. 1000 milliLiter(s) (50 mL/Hr) IV Continuous <Continuous>  dextrose 50% Injectable 12.5 Gram(s) IV Push once  dextrose 50% Injectable 25 Gram(s) IV Push once  dextrose 50% Injectable 25 Gram(s) IV Push once  enoxaparin Injectable 40 milliGRAM(s) SubCutaneous at bedtime  influenza   Vaccine 0.5 milliLiter(s) IntraMuscular once  insulin glargine Injectable (LANTUS) 15 Unit(s) SubCutaneous at bedtime  insulin lispro (HumaLOG) corrective regimen sliding scale   SubCutaneous three times a day before meals  insulin lispro Injectable (HumaLOG) 5 Unit(s) SubCutaneous three times a day before meals    MEDICATIONS  (PRN):  dextrose 40% Gel 15 Gram(s) Oral once PRN Blood Glucose LESS THAN 70 milliGRAM(s)/deciliter  glucagon  Injectable 1 milliGRAM(s) IntraMuscular once PRN Glucose LESS THAN 70 milligrams/deciliter         has abdominal pain RLQ. he said he had in the past but not this bad   no nausea and no vomiting   blurry vision mildly better today per patient   no chest pain and no sob     Vital Signs Last 24 Hrs  T(C): 35.7 (16 Oct 2020 05:36), Max: 36.4 (15 Oct 2020 13:09)  T(F): 96.2 (16 Oct 2020 05:36), Max: 97.5 (15 Oct 2020 13:09)  HR: 76 (16 Oct 2020 05:36) (76 - 98)  BP: 141/90 (16 Oct 2020 05:36) (141/83 - 145/85)  BP(mean): --  RR: 20 (16 Oct 2020 05:36) (18 - 20)  SpO2: --    PHYSICAL EXAM:  GENERAL: NAD  Pulm Clear to auscultation bilaterally; No wheeze  CV Regular rate and rhythm; No murmurs, rubs, or gallops  GI: Soft, right mid abdomen tenderness. + BS   EXTREMITIES:  2+ Peripheral Pulses, No clubbing, cyanosis, or edema  PSYCH: AAOx3  NEUROLOGY: non-focal  SKIN: No rashes                          15.7   6.23  )-----------( 190      ( 16 Oct 2020 05:30 )             45.7     10-16    138  |  101  |  15  ----------------------------<  152<H>  4.6   |  28  |  0.9    Ca    9.6      16 Oct 2020 05:30  Mg     2.2     10-16    TPro  7.0  /  Alb  4.2  /  TBili  0.9  /  DBili  x   /  AST  30  /  ALT  46<H>  /  AlkPhos  41  10-16    LIVER FUNCTIONS - ( 16 Oct 2020 05:30 )  Alb: 4.2 g/dL / Pro: 7.0 g/dL / ALK PHOS: 41 U/L / ALT: 46 U/L / AST: 30 U/L / GGT: x             CARDIAC MARKERS ( 14 Oct 2020 20:00 )  x     / <0.01 ng/mL / x     / x     / x      CARDIAC MARKERS ( 14 Oct 2020 12:33 )  x     / <0.01 ng/mL / x     / x     / x        MEDICATIONS  (STANDING):  amLODIPine   Tablet 5 milliGRAM(s) Oral daily  aspirin  chewable 81 milliGRAM(s) Oral daily  atorvastatin 80 milliGRAM(s) Oral at bedtime  baclofen 10 milliGRAM(s) Oral three times a day  dextrose 5%. 1000 milliLiter(s) (50 mL/Hr) IV Continuous <Continuous>  dextrose 50% Injectable 12.5 Gram(s) IV Push once  dextrose 50% Injectable 25 Gram(s) IV Push once  dextrose 50% Injectable 25 Gram(s) IV Push once  enoxaparin Injectable 40 milliGRAM(s) SubCutaneous at bedtime  influenza   Vaccine 0.5 milliLiter(s) IntraMuscular once  insulin glargine Injectable (LANTUS) 15 Unit(s) SubCutaneous at bedtime  insulin lispro (HumaLOG) corrective regimen sliding scale   SubCutaneous three times a day before meals  insulin lispro Injectable (HumaLOG) 5 Unit(s) SubCutaneous three times a day before meals    MEDICATIONS  (PRN):  dextrose 40% Gel 15 Gram(s) Oral once PRN Blood Glucose LESS THAN 70 milliGRAM(s)/deciliter  glucagon  Injectable 1 milliGRAM(s) IntraMuscular once PRN Glucose LESS THAN 70 milligrams/deciliter

## 2020-10-17 ENCOUNTER — TRANSCRIPTION ENCOUNTER (OUTPATIENT)
Age: 55
End: 2020-10-17

## 2020-10-17 VITALS — OXYGEN SATURATION: 94 %

## 2020-10-17 LAB
GLUCOSE BLDC GLUCOMTR-MCNC: 117 MG/DL — HIGH (ref 70–99)
GLUCOSE BLDC GLUCOMTR-MCNC: 135 MG/DL — HIGH (ref 70–99)

## 2020-10-17 PROCEDURE — 99239 HOSP IP/OBS DSCHRG MGMT >30: CPT

## 2020-10-17 RX ORDER — AMLODIPINE BESYLATE 2.5 MG/1
1 TABLET ORAL
Qty: 0 | Refills: 0 | DISCHARGE

## 2020-10-17 RX ORDER — ASPIRIN/CALCIUM CARB/MAGNESIUM 324 MG
1 TABLET ORAL
Qty: 30 | Refills: 0
Start: 2020-10-17 | End: 2020-11-15

## 2020-10-17 RX ORDER — METFORMIN HYDROCHLORIDE 850 MG/1
1 TABLET ORAL
Qty: 0 | Refills: 0 | DISCHARGE

## 2020-10-17 RX ORDER — AMLODIPINE BESYLATE 2.5 MG/1
1 TABLET ORAL
Qty: 30 | Refills: 0
Start: 2020-10-17 | End: 2020-11-15

## 2020-10-17 RX ORDER — METFORMIN HYDROCHLORIDE 850 MG/1
1 TABLET ORAL
Qty: 60 | Refills: 0
Start: 2020-10-17 | End: 2020-11-15

## 2020-10-17 RX ORDER — BACLOFEN 100 %
1 POWDER (GRAM) MISCELLANEOUS
Qty: 0 | Refills: 0 | DISCHARGE

## 2020-10-17 RX ADMIN — Medication 5 UNIT(S): at 12:25

## 2020-10-17 RX ADMIN — AMLODIPINE BESYLATE 5 MILLIGRAM(S): 2.5 TABLET ORAL at 05:23

## 2020-10-17 RX ADMIN — Medication 81 MILLIGRAM(S): at 12:26

## 2020-10-17 RX ADMIN — Medication 5 UNIT(S): at 07:56

## 2020-10-17 NOTE — PROGRESS NOTE ADULT - SUBJECTIVE AND OBJECTIVE BOX
SUBJECTIVE:    Patient is a 55y old Male who presents with a chief complaint of amaurosis fugax (16 Oct 2020 09:51)    Currently admitted to medicine with the primary diagnosis of Vision changes       Today is hospital day 3d.     PAST MEDICAL & SURGICAL HISTORY  DM (diabetes mellitus)      ALLERGIES:  No Known Allergies    MEDICATIONS:  STANDING MEDICATIONS  amLODIPine   Tablet 5 milliGRAM(s) Oral daily  aspirin  chewable 81 milliGRAM(s) Oral daily  atorvastatin 80 milliGRAM(s) Oral at bedtime  baclofen 10 milliGRAM(s) Oral three times a day  dextrose 5%. 1000 milliLiter(s) IV Continuous <Continuous>  dextrose 50% Injectable 12.5 Gram(s) IV Push once  dextrose 50% Injectable 25 Gram(s) IV Push once  dextrose 50% Injectable 25 Gram(s) IV Push once  enoxaparin Injectable 40 milliGRAM(s) SubCutaneous at bedtime  influenza   Vaccine 0.5 milliLiter(s) IntraMuscular once  insulin glargine Injectable (LANTUS) 15 Unit(s) SubCutaneous at bedtime  insulin lispro (HumaLOG) corrective regimen sliding scale   SubCutaneous three times a day before meals  insulin lispro Injectable (HumaLOG) 5 Unit(s) SubCutaneous three times a day before meals  sodium chloride 0.9%. 1000 milliLiter(s) IV Continuous <Continuous>    PRN MEDICATIONS  dextrose 40% Gel 15 Gram(s) Oral once PRN  glucagon  Injectable 1 milliGRAM(s) IntraMuscular once PRN    VITALS:   T(F): 96.2  HR: 84  BP: 136/83  RR: 18  SpO2: 94%    LABS:                        15.7   6.23  )-----------( 190      ( 16 Oct 2020 05:30 )             45.7     10-16    138  |  101  |  15  ----------------------------<  152<H>  4.6   |  28  |  0.9    Ca    9.6      16 Oct 2020 05:30  Mg     2.2     10-16    TPro  7.0  /  Alb  4.2  /  TBili  0.9  /  DBili  x   /  AST  30  /  ALT  46<H>  /  AlkPhos  41  10-16                  RADIOLOGY:    PHYSICAL EXAM:  GEN: No acute distress  LUNGS: Clear to auscultation bilaterally   HEART: S1/S2 present. RRR.   ABD/ GI: Soft, non-tender, non-distended. Bowel sounds present  EXT: NC/NC/NE/2+PP/DIALLO  NEURO: AAOX3

## 2020-10-17 NOTE — DISCHARGE NOTE NURSING/CASE MANAGEMENT/SOCIAL WORK - PATIENT PORTAL LINK FT
You can access the FollowMyHealth Patient Portal offered by [facility] by registering at the following website: [email or web address]. By joining [facility]’s FollowMyHealth portal, you will also be able to view your health information using other applications (apps) compatible with our system.

## 2020-10-17 NOTE — PROGRESS NOTE ADULT - ASSESSMENT
#blurriness of vision likely related to uncontrolled DM :   MRI brain neg for stroke   CTA head and neck neg for stenosis   on asa, lipitor will decrease to 40 mg qhs   seen by opthalmo and will need OPT follow up   OPT follow up with neuro     #Right sided abdomen pain worse when he eats. LFTs normal. patient has history of colostomy when he was 17 years old. will proceed with CT abd/pelvis with po and IV contrast c< from: CT Abdomen and Pelvis w/ Oral Cont and w/ IV Cont (10.16.20 @ 18:32) >  No evidence of acute intra-abdominal pathology. Normal appendix.  2. Hepatic steatosis.        #non specific t wave changes on EKG: seen by cardiology and recommended echo showed mild to mod AS  patient is asymptomatic and has neg troponins. OPT follow up     #DM: A1C is 7.1 would resume home metformin on discharge but not to hold for atleast 40 hours after contrast which he will get with CT Abd     Dc home today--spent more than 30mins.

## 2020-10-20 DIAGNOSIS — R20.0 ANESTHESIA OF SKIN: ICD-10-CM

## 2020-10-20 DIAGNOSIS — H53.123 TRANSIENT VISUAL LOSS, BILATERAL: ICD-10-CM

## 2020-10-20 DIAGNOSIS — R10.9 UNSPECIFIED ABDOMINAL PAIN: ICD-10-CM

## 2020-10-20 DIAGNOSIS — I10 ESSENTIAL (PRIMARY) HYPERTENSION: ICD-10-CM

## 2020-10-20 DIAGNOSIS — K76.0 FATTY (CHANGE OF) LIVER, NOT ELSEWHERE CLASSIFIED: ICD-10-CM

## 2020-10-20 DIAGNOSIS — E11.65 TYPE 2 DIABETES MELLITUS WITH HYPERGLYCEMIA: ICD-10-CM

## 2020-10-20 DIAGNOSIS — M25.511 PAIN IN RIGHT SHOULDER: ICD-10-CM

## 2020-11-18 ENCOUNTER — APPOINTMENT (OUTPATIENT)
Dept: INTERNAL MEDICINE | Facility: CLINIC | Age: 55
End: 2020-11-18
Payer: COMMERCIAL

## 2020-11-18 ENCOUNTER — OUTPATIENT (OUTPATIENT)
Dept: OUTPATIENT SERVICES | Facility: HOSPITAL | Age: 55
LOS: 1 days | Discharge: HOME | End: 2020-11-18

## 2020-11-18 VITALS
SYSTOLIC BLOOD PRESSURE: 145 MMHG | BODY MASS INDEX: 34.27 KG/M2 | TEMPERATURE: 98.4 F | HEART RATE: 98 BPM | HEIGHT: 72 IN | DIASTOLIC BLOOD PRESSURE: 89 MMHG | WEIGHT: 253 LBS

## 2020-11-18 PROCEDURE — 99202 OFFICE O/P NEW SF 15 MIN: CPT | Mod: GC

## 2020-11-18 NOTE — HISTORY OF PRESENT ILLNESS
[FreeTextEntry1] : establishment of care [de-identified] : 55 year old male with a PMHx of: DMII uib0z=4.1% (oct/2020)\par Presenting for establishment of care\par Recent hospitalization and discharged on oct 17th 2020. \par Patient was admitted for: transient vision loss\par MRI brain neg for stroke \par CTA head and neck neg for vascular stenosis. \par He was discharged with the diagnosis of blurry vision likley due to poorly controlled diabetes. \par \par Since his discharge he reports 3 episodes of nose bleeds. \par He also reports 1 episode where he had blood tinged sputum. \par Patient also reports increase stress in the past months associated with loosing his job, problems with his son.

## 2020-11-18 NOTE — ASSESSMENT
[FreeTextEntry1] : 55 year old male with a PMHx of: DMII ypz9c=8.1% (oct/2020)\par Presenting for establishment of care. \par Recent admission for blurry vision likely due to poorly controlled diabetes. \par \par # DMII, wmr3z=8.1%\par Complicated by blurry vision. Seen by opthalmo when in hospital who recommended \par - will follow up with opthalmo next week\par - on metformin 850mg BID\par - repeat hba1c next visit\par \par # HTN \par - on amlodipine 10mg OD (dose increased 7 days ago from 5mg)\par \par # nose bleed 3x in past 2 weeks\par bleeding stopped immediately, so likely due to dry mucosa\par - ENT referral \par \par # generalized stress and muscle aches \par - encouraged resume physical activity \par \par # HCM\par - flu vaccine: patient refused\par - colonoscopy: done in 2019 --> WNL as per patient \par - on aspirin for primary prophylaxis. \par \par routine blood work prior to next visit - f/u in 6 months and prn

## 2020-11-18 NOTE — REVIEW OF SYSTEMS
[Vision Problems] : vision problems [Shortness Of Breath] : shortness of breath [Joint Pain] : joint pain [Joint Stiffness] : joint stiffness [Muscle Pain] : muscle pain [Fever] : no fever [Chills] : no chills [Nasal Discharge] : no nasal discharge [Sore Throat] : no sore throat [Abdominal Pain] : no abdominal pain [Nausea] : no nausea [Constipation] : no constipation [Diarrhea] : no diarrhea [Vomiting] : no vomiting [Melena] : no melena [Dysuria] : no dysuria [Incontinence] : no incontinence [Hematuria] : no hematuria [Frequency] : no frequency [FreeTextEntry6] : shortness of breath associated with taking very deep breaths

## 2020-11-18 NOTE — HEALTH RISK ASSESSMENT
[Fair] : ~his/her~ current health as fair  [Yes] : Yes [Patient reported colonoscopy was normal] : Patient reported colonoscopy was normal [] : No [de-identified] : social  [ColonoscopyComments] : done in 2019 --> WNL

## 2020-11-18 NOTE — PHYSICAL EXAM
[No Acute Distress] : no acute distress [Well Nourished] : well nourished [No JVD] : no jugular venous distention [No Lymphadenopathy] : no lymphadenopathy [Normal Outer Ear/Nose] : the outer ears and nose were normal in appearance [Normal Oropharynx] : the oropharynx was normal [Normal Nasal Mucosa] : the nasal mucosa was normal [No Respiratory Distress] : no respiratory distress  [No Accessory Muscle Use] : no accessory muscle use [Clear to Auscultation] : lungs were clear to auscultation bilaterally [Normal Rate] : normal rate  [Regular Rhythm] : with a regular rhythm [Normal S1, S2] : normal S1 and S2 [No Edema] : there was no peripheral edema [Soft] : abdomen soft [Non Tender] : non-tender [Non-distended] : non-distended [No Rash] : no rash [Normal] : no rash [de-identified] : appears anxious

## 2020-11-25 ENCOUNTER — APPOINTMENT (OUTPATIENT)
Dept: OPHTHALMOLOGY | Facility: CLINIC | Age: 55
End: 2020-11-25

## 2020-11-25 ENCOUNTER — OUTPATIENT (OUTPATIENT)
Dept: OUTPATIENT SERVICES | Facility: HOSPITAL | Age: 55
LOS: 1 days | Discharge: HOME | End: 2020-11-25
Payer: COMMERCIAL

## 2020-11-25 PROCEDURE — 92133 CPTRZD OPH DX IMG PST SGM ON: CPT | Mod: 26

## 2020-11-25 PROCEDURE — 92004 COMPRE OPH EXAM NEW PT 1/>: CPT

## 2020-11-27 DIAGNOSIS — H35.039 HYPERTENSIVE RETINOPATHY, UNSPECIFIED EYE: ICD-10-CM

## 2020-11-27 DIAGNOSIS — H50.53 VERTICAL HETEROPHORIA: ICD-10-CM

## 2020-11-27 DIAGNOSIS — H18.599 OTHER HEREDITARY CORNEAL DYSTROPHIES, UNSPECIFIED EYE: ICD-10-CM

## 2020-11-27 DIAGNOSIS — E11.9 TYPE 2 DIABETES MELLITUS WITHOUT COMPLICATIONS: ICD-10-CM

## 2020-11-27 DIAGNOSIS — H53.19 OTHER SUBJECTIVE VISUAL DISTURBANCES: ICD-10-CM

## 2020-11-27 DIAGNOSIS — H35.412 LATTICE DEGENERATION OF RETINA, LEFT EYE: ICD-10-CM

## 2020-11-27 DIAGNOSIS — H40.013 OPEN ANGLE WITH BORDERLINE FINDINGS, LOW RISK, BILATERAL: ICD-10-CM

## 2020-12-17 ENCOUNTER — NON-APPOINTMENT (OUTPATIENT)
Age: 55
End: 2020-12-17

## 2020-12-22 ENCOUNTER — APPOINTMENT (OUTPATIENT)
Dept: NEUROLOGY | Facility: CLINIC | Age: 55
End: 2020-12-22

## 2021-01-13 ENCOUNTER — OUTPATIENT (OUTPATIENT)
Dept: OUTPATIENT SERVICES | Facility: HOSPITAL | Age: 56
LOS: 1 days | Discharge: HOME | End: 2021-01-13
Payer: COMMERCIAL

## 2021-01-13 ENCOUNTER — APPOINTMENT (OUTPATIENT)
Dept: OPHTHALMOLOGY | Facility: CLINIC | Age: 56
End: 2021-01-13

## 2021-01-13 PROCEDURE — 92012 INTRM OPH EXAM EST PATIENT: CPT

## 2021-01-15 DIAGNOSIS — H50.53 VERTICAL HETEROPHORIA: ICD-10-CM

## 2021-01-15 DIAGNOSIS — E11.9 TYPE 2 DIABETES MELLITUS WITHOUT COMPLICATIONS: ICD-10-CM

## 2021-01-15 DIAGNOSIS — H53.19 OTHER SUBJECTIVE VISUAL DISTURBANCES: ICD-10-CM

## 2021-01-19 ENCOUNTER — OUTPATIENT (OUTPATIENT)
Dept: OUTPATIENT SERVICES | Facility: HOSPITAL | Age: 56
LOS: 1 days | Discharge: HOME | End: 2021-01-19

## 2021-01-19 ENCOUNTER — APPOINTMENT (OUTPATIENT)
Dept: NEUROLOGY | Facility: CLINIC | Age: 56
End: 2021-01-19
Payer: COMMERCIAL

## 2021-01-19 VITALS
SYSTOLIC BLOOD PRESSURE: 164 MMHG | TEMPERATURE: 97.9 F | OXYGEN SATURATION: 94 % | DIASTOLIC BLOOD PRESSURE: 100 MMHG | BODY MASS INDEX: 34.13 KG/M2 | WEIGHT: 252 LBS | HEIGHT: 72 IN | HEART RATE: 88 BPM

## 2021-01-19 PROCEDURE — 99214 OFFICE O/P EST MOD 30 MIN: CPT | Mod: GC

## 2021-01-19 NOTE — PHYSICAL EXAM
[General Appearance - Alert] : alert [General Appearance - In No Acute Distress] : in no acute distress [Oriented To Time, Place, And Person] : oriented to person, place, and time [Affect] : the affect was normal [Person] : oriented to person [Place] : oriented to place [Time] : oriented to time [Cranial Nerves Oculomotor (III)] : extraocular motion intact [Motor Tone] : muscle tone was normal in all four extremities [Motor Strength] : muscle strength was normal in all four extremities [Abnormal Walk] : normal gait [Sclera] : the sclera and conjunctiva were normal [Extraocular Movements] : extraocular movements were intact [Apical Impulse] : the apical impulse was normal [Heart Sounds] : normal S1 and S2 [Skin Turgor] : normal skin turgor

## 2021-01-19 NOTE — HISTORY OF PRESENT ILLNESS
[FreeTextEntry1] : 56 yo M with PMHx of DMII and HTN presents to the clinic for "kaleidoscope vision." he says it has been going on for a few months since his transient vision loss (BL) in october. This change in vision comes and goes on its own, is every other day, never more than once a day. He is unable to identify any aggravating and relieving factors. Each episode lasts about 25 minutes. Most of the times it happens in the middle of the day after lunch. \par He says his balance is a little "off" since summer. He says he has a lot of anxiety. When he bends over and gets up he feels like he might pass out. He has never lost consciousness. He does recognize stressors in his personal life but nothing new. He works as a , delivers oil. This episode of kaleidoscope vision has occurred three times while driving and he has had to pull over. \par He had a recent hospitalization and was discharged on oct 17th 2020. Patient was admitted for transient vision loss. MRI brain neg for stroke. CTA head and neck neg for vascular stenosis. \par He was discharged with the diagnosis of blurry vision likely due to poorly controlled diabetes. \par

## 2021-01-19 NOTE — END OF VISIT
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  Structural causes not identified.  \par Suggested a number of potential prophylactic strategies.  He was encouraged to try 400 mg magnesium oxide and/or\par 400 mg/day riboflavin for a couple of months.  Failing that will suggest propranolol 10 mg bid or verapamil.\par Return in 3 months.

## 2021-01-22 DIAGNOSIS — H53.9 UNSPECIFIED VISUAL DISTURBANCE: ICD-10-CM

## 2021-01-22 DIAGNOSIS — Z00.00 ENCOUNTER FOR GENERAL ADULT MEDICAL EXAMINATION WITHOUT ABNORMAL FINDINGS: ICD-10-CM

## 2021-01-27 ENCOUNTER — OUTPATIENT (OUTPATIENT)
Dept: OUTPATIENT SERVICES | Facility: HOSPITAL | Age: 56
LOS: 1 days | Discharge: HOME | End: 2021-01-27
Payer: COMMERCIAL

## 2021-01-27 ENCOUNTER — APPOINTMENT (OUTPATIENT)
Dept: OTOLARYNGOLOGY | Facility: CLINIC | Age: 56
End: 2021-01-27

## 2021-01-27 ENCOUNTER — APPOINTMENT (OUTPATIENT)
Dept: OPHTHALMOLOGY | Facility: CLINIC | Age: 56
End: 2021-01-27

## 2021-01-27 DIAGNOSIS — H53.19 OTHER SUBJECTIVE VISUAL DISTURBANCES: ICD-10-CM

## 2021-01-27 DIAGNOSIS — H50.53 VERTICAL HETEROPHORIA: ICD-10-CM

## 2021-01-27 PROCEDURE — 92012 INTRM OPH EXAM EST PATIENT: CPT

## 2021-02-23 ENCOUNTER — OUTPATIENT (OUTPATIENT)
Dept: OUTPATIENT SERVICES | Facility: HOSPITAL | Age: 56
LOS: 1 days | Discharge: HOME | End: 2021-02-23
Payer: MEDICAID

## 2021-02-23 ENCOUNTER — APPOINTMENT (OUTPATIENT)
Dept: OPHTHALMOLOGY | Facility: CLINIC | Age: 56
End: 2021-02-23

## 2021-02-23 DIAGNOSIS — H53.19 OTHER SUBJECTIVE VISUAL DISTURBANCES: ICD-10-CM

## 2021-02-23 DIAGNOSIS — E11.9 TYPE 2 DIABETES MELLITUS WITHOUT COMPLICATIONS: ICD-10-CM

## 2021-02-23 PROCEDURE — 92012 INTRM OPH EXAM EST PATIENT: CPT

## 2021-03-04 ENCOUNTER — APPOINTMENT (OUTPATIENT)
Dept: NEUROLOGY | Facility: CLINIC | Age: 56
End: 2021-03-04

## 2021-04-20 ENCOUNTER — APPOINTMENT (OUTPATIENT)
Dept: NEUROLOGY | Facility: CLINIC | Age: 56
End: 2021-04-20

## 2021-04-21 ENCOUNTER — APPOINTMENT (OUTPATIENT)
Dept: INTERNAL MEDICINE | Facility: CLINIC | Age: 56
End: 2021-04-21
Payer: MEDICAID

## 2021-04-21 ENCOUNTER — OUTPATIENT (OUTPATIENT)
Dept: OUTPATIENT SERVICES | Facility: HOSPITAL | Age: 56
LOS: 1 days | Discharge: HOME | End: 2021-04-21

## 2021-04-21 VITALS
OXYGEN SATURATION: 98 % | SYSTOLIC BLOOD PRESSURE: 153 MMHG | WEIGHT: 260 LBS | BODY MASS INDEX: 35.21 KG/M2 | DIASTOLIC BLOOD PRESSURE: 87 MMHG | HEIGHT: 72 IN | TEMPERATURE: 96.5 F | HEART RATE: 100 BPM

## 2021-04-21 DIAGNOSIS — Z00.00 ENCOUNTER FOR GENERAL ADULT MEDICAL EXAMINATION WITHOUT ABNORMAL FINDINGS: ICD-10-CM

## 2021-04-21 DIAGNOSIS — E11.9 TYPE 2 DIABETES MELLITUS WITHOUT COMPLICATIONS: ICD-10-CM

## 2021-04-21 DIAGNOSIS — H53.9 UNSPECIFIED VISUAL DISTURBANCE: ICD-10-CM

## 2021-04-21 DIAGNOSIS — I10 ESSENTIAL (PRIMARY) HYPERTENSION: ICD-10-CM

## 2021-04-21 PROCEDURE — 99213 OFFICE O/P EST LOW 20 MIN: CPT | Mod: GC

## 2021-04-21 RX ORDER — AMLODIPINE BESYLATE 10 MG/1
10 TABLET ORAL DAILY
Refills: 0 | Status: DISCONTINUED | COMMUNITY
End: 2021-04-21

## 2021-04-21 NOTE — REVIEW OF SYSTEMS
[Fever] : no fever [Chills] : no chills [Vision Problems] : no vision problems [Nasal Discharge] : no nasal discharge [Sore Throat] : no sore throat [Shortness Of Breath] : no shortness of breath [Abdominal Pain] : no abdominal pain [Nausea] : no nausea [Constipation] : no constipation [Diarrhea] : no diarrhea [Vomiting] : no vomiting [Melena] : no melena [Dysuria] : no dysuria [Incontinence] : no incontinence [Hematuria] : no hematuria [Frequency] : no frequency [Joint Pain] : no joint pain [Joint Stiffness] : no joint stiffness [Muscle Pain] : no muscle pain

## 2021-04-21 NOTE — PHYSICAL EXAM
[Well Nourished] : well nourished [Normal Outer Ear/Nose] : the outer ears and nose were normal in appearance [Normal Oropharynx] : the oropharynx was normal [Normal Nasal Mucosa] : the nasal mucosa was normal [No JVD] : no jugular venous distention [No Lymphadenopathy] : no lymphadenopathy [No Respiratory Distress] : no respiratory distress  [No Accessory Muscle Use] : no accessory muscle use [Clear to Auscultation] : lungs were clear to auscultation bilaterally [Normal Rate] : normal rate  [Regular Rhythm] : with a regular rhythm [Normal S1, S2] : normal S1 and S2 [No Edema] : there was no peripheral edema [Soft] : abdomen soft [Non Tender] : non-tender [Non-distended] : non-distended [No Rash] : no rash [Normal] : no rash

## 2021-04-21 NOTE — HISTORY OF PRESENT ILLNESS
[de-identified] : 55 year old male with a PMHx of: DM2 and HTN come to the clinic for follow up and go over lab work. Patient has been off amlodipine for 2 months. says that he did not like how he felt on this medications. he states that the amlodipine was not controlling his BP any ways so he stopped it. Would like to try medication propranolol as per neuro recs.  [FreeTextEntry1] : Follow up

## 2021-04-21 NOTE — HEALTH RISK ASSESSMENT
[Yes] : Yes [Fair] : ~his/her~ current health as fair  [Patient reported colonoscopy was normal] : Patient reported colonoscopy was normal [] : No [de-identified] : social  [ColonoscopyComments] : done in 2019 --> WNL

## 2021-04-21 NOTE — ASSESSMENT
[FreeTextEntry1] : 55 year old male with a PMHx of: DMII , HTN comes in for follow up \par \par # DMII, jkm7n=8.7\par # Kaleidoscope vision- likely acephalgic migraine/silent migraine as per neuro- patient states that he is doing better\par Complicated by blurry vision. Seen by opthalmo when in hospital who recommended \par - MRI 10/14 shows no acute pathology, chronic microvascular changes \par - last seen by optho - 01/21\par - follows neurology\par - does not use OTC Mg 400mg qd because of side effects \par - continue vitamin b2 riboflavin OTC for prevention\par - continue metformin 850mg BID\par - ESR, CRP, b12, folate - WNL \par \par # HTN \par - not taking meds at this time. Would like to try to control BP through lifestyle modifications \par - no taking currently taking amlodipine 10mg OD\par - start lisinopril 5 mg \par \par #OA \par - start on meloxicam \par \par # generalized stress and muscle aches \par - encouraged resume physical activity \par \par # HCM\par - flu vaccine: patient refused\par - colonoscopy: done in 2019 --> WNL as per patient \par - fu in 6 weeks for BP follow up

## 2021-05-12 ENCOUNTER — APPOINTMENT (OUTPATIENT)
Dept: INTERNAL MEDICINE | Facility: CLINIC | Age: 56
End: 2021-05-12

## 2021-06-16 ENCOUNTER — APPOINTMENT (OUTPATIENT)
Dept: INTERNAL MEDICINE | Facility: CLINIC | Age: 56
End: 2021-06-16
Payer: COMMERCIAL

## 2021-06-16 ENCOUNTER — NON-APPOINTMENT (OUTPATIENT)
Age: 56
End: 2021-06-16

## 2021-06-16 ENCOUNTER — OUTPATIENT (OUTPATIENT)
Dept: OUTPATIENT SERVICES | Facility: HOSPITAL | Age: 56
LOS: 1 days | Discharge: HOME | End: 2021-06-16

## 2021-06-16 VITALS
BODY MASS INDEX: 36.57 KG/M2 | WEIGHT: 270 LBS | HEIGHT: 72 IN | TEMPERATURE: 99.5 F | DIASTOLIC BLOOD PRESSURE: 104 MMHG | SYSTOLIC BLOOD PRESSURE: 162 MMHG | OXYGEN SATURATION: 95 % | HEART RATE: 102 BPM

## 2021-06-16 DIAGNOSIS — E11.9 TYPE 2 DIABETES MELLITUS WITHOUT COMPLICATIONS: ICD-10-CM

## 2021-06-16 DIAGNOSIS — I10 ESSENTIAL (PRIMARY) HYPERTENSION: ICD-10-CM

## 2021-06-16 DIAGNOSIS — H53.9 UNSPECIFIED VISUAL DISTURBANCE: ICD-10-CM

## 2021-06-16 PROCEDURE — 99213 OFFICE O/P EST LOW 20 MIN: CPT | Mod: GC

## 2021-06-16 RX ORDER — MELOXICAM 15 MG/1
15 TABLET ORAL
Qty: 30 | Refills: 3 | Status: ACTIVE | COMMUNITY
Start: 2021-04-21 | End: 1900-01-01

## 2021-06-16 NOTE — ASSESSMENT
[FreeTextEntry1] : 56 year old male with a PMHx of: DMII , HTN comes in for follow up \par \par # DMII, ioh1g=2.7\par # Kaleidoscope vision- much improved after attaining glasses\par - MRI 10/14 shows no acute pathology, chronic microvascular changes \par - last seen by optho - 02/21\par - follows neurology\par - continue metformin 850mg BID\par \par # HTN \par - on lisinopril 5 mg, but inconsistent\par - Advised to take 5mg consistently, if no major changes in BP in 2 weeks adjust to 10mg daily. \par - Advised to check bp religiously at home. dash and low salt diet\par \par #OA\par #B/l meniscal tear\par - has not been taking meloxicam\par - interested in knee injection (viscosupplementation) \par \par # generalized stress and muscle aches \par - encouraged resume physical activity \par \par # HCM\par - flu vaccine: patient refused\par - colonoscopy: done in 2019 --> WNL as per patient \par - fu in 2 months for BP follow up.

## 2021-06-16 NOTE — HISTORY OF PRESENT ILLNESS
[FreeTextEntry1] : Follow up [de-identified] : 56 year old male with a PMHx of: DM2 and HTN come to the clinic for follow up. No chief complaints at todays visit. Patient notes that his bp measurement at home has been ranging from 140-155 systolic and 80-90 diastolic. He has been taking lisinopril inconsistently. He notes that since last visit 10 pounds. Patient states that he has been eating more than usual due to life stressors. Otherwise denies cp, sob, n/v/d.

## 2021-06-16 NOTE — PHYSICAL EXAM
[Normal] : normal gait, coordination grossly intact, no focal deficits and deep tendon reflexes were 2+ and symmetric [de-identified] : mild systolic murmur

## 2021-08-25 ENCOUNTER — APPOINTMENT (OUTPATIENT)
Dept: OPHTHALMOLOGY | Facility: CLINIC | Age: 56
End: 2021-08-25

## 2021-09-10 ENCOUNTER — APPOINTMENT (OUTPATIENT)
Dept: INTERNAL MEDICINE | Facility: CLINIC | Age: 56
End: 2021-09-10

## 2021-09-29 ENCOUNTER — APPOINTMENT (OUTPATIENT)
Dept: OPHTHALMOLOGY | Facility: CLINIC | Age: 56
End: 2021-09-29

## 2021-10-27 ENCOUNTER — APPOINTMENT (OUTPATIENT)
Dept: OPHTHALMOLOGY | Facility: CLINIC | Age: 56
End: 2021-10-27
Payer: MEDICAID

## 2021-10-27 ENCOUNTER — OUTPATIENT (OUTPATIENT)
Dept: OUTPATIENT SERVICES | Facility: HOSPITAL | Age: 56
LOS: 1 days | Discharge: HOME | End: 2021-10-27

## 2021-10-27 PROCEDURE — ZZZZZ: CPT

## 2021-10-29 DIAGNOSIS — G43.B0 OPHTHALMOPLEGIC MIGRAINE, NOT INTRACTABLE: ICD-10-CM

## 2021-10-29 DIAGNOSIS — H35.033 HYPERTENSIVE RETINOPATHY, BILATERAL: ICD-10-CM

## 2021-10-29 DIAGNOSIS — E11.9 TYPE 2 DIABETES MELLITUS WITHOUT COMPLICATIONS: ICD-10-CM

## 2021-10-29 DIAGNOSIS — H50.51 ESOPHORIA: ICD-10-CM

## 2021-10-29 DIAGNOSIS — H50.53 VERTICAL HETEROPHORIA: ICD-10-CM

## 2021-11-11 ENCOUNTER — OUTPATIENT (OUTPATIENT)
Dept: OUTPATIENT SERVICES | Facility: HOSPITAL | Age: 56
LOS: 1 days | Discharge: HOME | End: 2021-11-11

## 2021-11-11 ENCOUNTER — APPOINTMENT (OUTPATIENT)
Dept: OPHTHALMOLOGY | Facility: CLINIC | Age: 56
End: 2021-11-11
Payer: MEDICAID

## 2021-11-11 PROCEDURE — ZZZZZ: CPT

## 2021-11-19 DIAGNOSIS — H18.529 EPITHELIAL (JUVENILE) CORNEAL DYSTROPHY, UNSPECIFIED EYE: ICD-10-CM

## 2021-11-19 DIAGNOSIS — H53.19 OTHER SUBJECTIVE VISUAL DISTURBANCES: ICD-10-CM

## 2021-11-19 DIAGNOSIS — H50.53 VERTICAL HETEROPHORIA: ICD-10-CM

## 2021-11-19 DIAGNOSIS — H53.8 OTHER VISUAL DISTURBANCES: ICD-10-CM

## 2022-01-10 ENCOUNTER — OUTPATIENT (OUTPATIENT)
Dept: OUTPATIENT SERVICES | Facility: HOSPITAL | Age: 57
LOS: 1 days | Discharge: HOME | End: 2022-01-10

## 2022-01-10 ENCOUNTER — NON-APPOINTMENT (OUTPATIENT)
Age: 57
End: 2022-01-10

## 2022-01-10 ENCOUNTER — APPOINTMENT (OUTPATIENT)
Dept: INTERNAL MEDICINE | Facility: CLINIC | Age: 57
End: 2022-01-10
Payer: MEDICAID

## 2022-01-10 VITALS
WEIGHT: 270 LBS | DIASTOLIC BLOOD PRESSURE: 102 MMHG | BODY MASS INDEX: 36.57 KG/M2 | OXYGEN SATURATION: 96 % | HEART RATE: 100 BPM | HEIGHT: 72 IN | SYSTOLIC BLOOD PRESSURE: 162 MMHG | TEMPERATURE: 98.2 F

## 2022-01-10 DIAGNOSIS — L98.9 DISORDER OF THE SKIN AND SUBCUTANEOUS TISSUE, UNSPECIFIED: ICD-10-CM

## 2022-01-10 PROCEDURE — 99214 OFFICE O/P EST MOD 30 MIN: CPT | Mod: GC

## 2022-01-10 NOTE — PHYSICAL EXAM
[Normal] : normal rate, regular rhythm, normal S1 and S2 and no murmur heard [Soft] : abdomen soft [Non Tender] : non-tender [de-identified] : R clavicle ulcerated lesion with raised borders

## 2022-01-10 NOTE — HISTORY OF PRESENT ILLNESS
[FreeTextEntry1] : Patient is a 55yo M presenting for follow up and medication refill + skin lesion R clavicle  [de-identified] : 56 year old male with \par \par PMHx of: DM2 and HTN \par Come to the clinic for follow up and for medication refill. \par \par Pt indicated he is concerned about a skin lesion that has been growing on the R clavicle for years. Lesion has been growing in size in the past year and the past summer he has had extensive sun exposure.  Lesion has raised borders and central erosion \par \par Patient notes that his bp measurement at home has been ranging from 140-155 systolic and 80-90 diastolic on lisinopril 5/d\par Today BP : 162/ 102 - indicated he drank 5 coffees before visit \par \par Weight stable 270 \par Otherwise denies CP, sob, n/v/d. \par \par Last blood work 6mo ago\par Needs A1c and blood sugar trend

## 2022-01-10 NOTE — ASSESSMENT
[FreeTextEntry1] : 56 year old male with a PMHx of: DMII , HTN comes in for follow up and medication refill  \par \par #DM\par - follow up A1c (no recent value in past 6mo)\par \par # Skin lesion \par - Dermatology referral \par \par # HTN\par - lisinopril increased 5-> 10 /d \par - will f/u in 1 mo for BP monitoring , if still high will add amlodipine 5 qd\par \par #OA\par #B/l meniscal tear\par - has not been taking meloxicam\par - interested in knee injection (viscosupplementation) - will discuss at next visit\par \par # Health maintenance \par - colonoscopy: done in 2019 --> WNL as per patient \par - RTC in 1 mo for  routine lab work review and BP assessment

## 2022-01-12 DIAGNOSIS — L98.9 DISORDER OF THE SKIN AND SUBCUTANEOUS TISSUE, UNSPECIFIED: ICD-10-CM

## 2022-01-12 DIAGNOSIS — I10 ESSENTIAL (PRIMARY) HYPERTENSION: ICD-10-CM

## 2022-01-12 DIAGNOSIS — Z00.00 ENCOUNTER FOR GENERAL ADULT MEDICAL EXAMINATION WITHOUT ABNORMAL FINDINGS: ICD-10-CM

## 2022-01-12 DIAGNOSIS — E11.9 TYPE 2 DIABETES MELLITUS WITHOUT COMPLICATIONS: ICD-10-CM

## 2022-02-14 ENCOUNTER — APPOINTMENT (OUTPATIENT)
Dept: INTERNAL MEDICINE | Facility: CLINIC | Age: 57
End: 2022-02-14

## 2022-02-28 LAB
ALBUMIN SERPL ELPH-MCNC: 4.2 G/DL
ALP BLD-CCNC: 51 U/L
ALT SERPL-CCNC: 59 U/L
ANION GAP SERPL CALC-SCNC: 16 MMOL/L
AST SERPL-CCNC: 36 U/L
BASOPHILS # BLD AUTO: 0.05 K/UL
BASOPHILS NFR BLD AUTO: 0.8 %
BILIRUB SERPL-MCNC: 0.5 MG/DL
BUN SERPL-MCNC: 17 MG/DL
CALCIUM SERPL-MCNC: 9.7 MG/DL
CHLORIDE SERPL-SCNC: 96 MMOL/L
CO2 SERPL-SCNC: 21 MMOL/L
CREAT SERPL-MCNC: 0.9 MG/DL
EGFR: 99 ML/MIN/1.73M2
EOSINOPHIL # BLD AUTO: 0.16 K/UL
EOSINOPHIL NFR BLD AUTO: 2.6 %
ESTIMATED AVERAGE GLUCOSE: 180 MG/DL
GLUCOSE SERPL-MCNC: 355 MG/DL
HBA1C MFR BLD HPLC: 7.9 %
HCT VFR BLD CALC: 45.9 %
HGB BLD-MCNC: 15.6 G/DL
IMM GRANULOCYTES NFR BLD AUTO: 0.3 %
LYMPHOCYTES # BLD AUTO: 1.85 K/UL
LYMPHOCYTES NFR BLD AUTO: 29.9 %
MAN DIFF?: NORMAL
MCHC RBC-ENTMCNC: 31.3 PG
MCHC RBC-ENTMCNC: 34 G/DL
MCV RBC AUTO: 92.2 FL
MONOCYTES # BLD AUTO: 0.59 K/UL
MONOCYTES NFR BLD AUTO: 9.5 %
NEUTROPHILS # BLD AUTO: 3.52 K/UL
NEUTROPHILS NFR BLD AUTO: 56.9 %
PLATELET # BLD AUTO: 206 K/UL
POTASSIUM SERPL-SCNC: 4.5 MMOL/L
PROT SERPL-MCNC: 7.3 G/DL
RBC # BLD: 4.98 M/UL
RBC # FLD: 12.2 %
SODIUM SERPL-SCNC: 133 MMOL/L
TSH SERPL-ACNC: 0.94 UIU/ML
WBC # FLD AUTO: 6.19 K/UL

## 2022-03-07 ENCOUNTER — OUTPATIENT (OUTPATIENT)
Dept: OUTPATIENT SERVICES | Facility: HOSPITAL | Age: 57
LOS: 1 days | Discharge: HOME | End: 2022-03-07
Payer: MEDICAID

## 2022-03-07 ENCOUNTER — APPOINTMENT (OUTPATIENT)
Dept: INTERNAL MEDICINE | Facility: CLINIC | Age: 57
End: 2022-03-07
Payer: MEDICAID

## 2022-03-07 VITALS
DIASTOLIC BLOOD PRESSURE: 86 MMHG | OXYGEN SATURATION: 99 % | HEART RATE: 102 BPM | HEIGHT: 72 IN | SYSTOLIC BLOOD PRESSURE: 133 MMHG | TEMPERATURE: 97.9 F | BODY MASS INDEX: 35.76 KG/M2 | WEIGHT: 264 LBS

## 2022-03-07 PROCEDURE — 93010 ELECTROCARDIOGRAM REPORT: CPT

## 2022-03-07 PROCEDURE — 99213 OFFICE O/P EST LOW 20 MIN: CPT | Mod: GC

## 2022-03-07 RX ORDER — LISINOPRIL 10 MG/1
10 TABLET ORAL DAILY
Qty: 30 | Refills: 1 | Status: DISCONTINUED | COMMUNITY
Start: 2021-04-21 | End: 2022-03-07

## 2022-03-09 RX ORDER — SITAGLIPTIN 100 MG/1
100 TABLET, FILM COATED ORAL DAILY
Qty: 30 | Refills: 3 | Status: COMPLETED | COMMUNITY
Start: 2022-03-07 | End: 2022-03-09

## 2022-03-10 ENCOUNTER — NON-APPOINTMENT (OUTPATIENT)
Age: 57
End: 2022-03-10

## 2022-03-14 ENCOUNTER — NON-APPOINTMENT (OUTPATIENT)
Age: 57
End: 2022-03-14

## 2022-03-14 RX ORDER — METFORMIN HYDROCHLORIDE 850 MG/1
850 TABLET, COATED ORAL TWICE DAILY
Qty: 180 | Refills: 1 | Status: DISCONTINUED | COMMUNITY
Start: 1900-01-01 | End: 2022-03-14

## 2022-03-21 DIAGNOSIS — E11.9 TYPE 2 DIABETES MELLITUS WITHOUT COMPLICATIONS: ICD-10-CM

## 2022-03-21 DIAGNOSIS — I10 ESSENTIAL (PRIMARY) HYPERTENSION: ICD-10-CM

## 2022-03-21 DIAGNOSIS — Z00.00 ENCOUNTER FOR GENERAL ADULT MEDICAL EXAMINATION WITHOUT ABNORMAL FINDINGS: ICD-10-CM

## 2022-04-05 ENCOUNTER — APPOINTMENT (OUTPATIENT)
Dept: PLASTIC SURGERY | Facility: CLINIC | Age: 57
End: 2022-04-05
Payer: MEDICAID

## 2022-04-05 VITALS — HEIGHT: 72 IN | WEIGHT: 250 LBS | BODY MASS INDEX: 33.86 KG/M2
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
## 2022-04-05 DIAGNOSIS — Z78.9 OTHER SPECIFIED HEALTH STATUS: ICD-10-CM

## 2022-04-05 PROCEDURE — 99203 OFFICE O/P NEW LOW 30 MIN: CPT

## 2022-04-05 RX ORDER — MV-MIN/FOLIC/VIT K/LYCOP/COQ10 200-100MCG
CAPSULE ORAL
Refills: 0 | Status: ACTIVE | COMMUNITY

## 2022-04-05 NOTE — HISTORY OF PRESENT ILLNESS
[FreeTextEntry1] : 58 yo M with PMHx of HTN and DM Type II who presents today for evaluation of recently diagnosed BCC of right  shoulder. Pt reports having a small red irritated skin lesion for the past 2-3 years increasing in size and starting to bleed spontaneously. Office biopsy by Dr. Ramos in February 2022 revealed BCC. Patient presents today for evaluation and reconstructive options. \par Denies any personal or family h/o skin cancer. \par \par \par Occupation -  \par Nonsmoker

## 2022-04-05 NOTE — PHYSICAL EXAM
[de-identified] : well developed overweight male, NAD [de-identified] : NC/AT [de-identified] : supple [de-identified] : unlabored breathing  [de-identified] : SHELLIER [de-identified] : soft, nontender  [de-identified] : Right shoulder - erythematous skin lesion with healing biopsy site over right clavicle measuring 2.5 x 0.5 cm, sun damaged skin

## 2022-04-05 NOTE — DATA REVIEWED
[FreeTextEntry1] : 2/14/22 Pathology - right shoulder - Basal cell carcinoma, nodular and focally infiltrative

## 2022-04-05 NOTE — ASSESSMENT
[FreeTextEntry1] : 58 yo diabetic M with recently diagnosed BCC of right shoulder requiring WLE with soft tissue reconstruction. \par \par as above\par rigth suprclavicular BCC\par needs excisioni n LATRELL\par \par Regarding the reconstruction after skin cancer  surgery, we discussed scarring, risk of repeat procedure, poor wound healing, need for additional revisionary surgery,asymmetry, dissatisfaction with the outcome, and unplanned surgery in the future.  All questions were answered.  All risks were well understood by the patient.\par \par Regarding the procedure, we discussed scarring, poor wound healing, bleeding, infection, need for additional surgery, and dissatisfaction with the outcome.  Also discussed possibility of keloid and/or hypertrophic scar formation as well as recurrence.  All questions were answered and risks understood.\par \par Due to COVID 19, pre-visit patient instructions were explained to the patient and their symptoms were checked upon arrival.  \par Masks were used by the health care providers and staff and the examination room was cleaned after the patient visit was completed.\par \par Pt not vaccinated\par \par works driving oil truck\par \par

## 2022-04-08 ENCOUNTER — RESULT REVIEW (OUTPATIENT)
Age: 57
End: 2022-04-08

## 2022-04-08 ENCOUNTER — OUTPATIENT (OUTPATIENT)
Dept: OUTPATIENT SERVICES | Facility: HOSPITAL | Age: 57
LOS: 1 days | Discharge: HOME | End: 2022-04-08
Payer: MEDICAID

## 2022-04-08 VITALS
HEART RATE: 98 BPM | SYSTOLIC BLOOD PRESSURE: 136 MMHG | OXYGEN SATURATION: 95 % | RESPIRATION RATE: 18 BRPM | WEIGHT: 265 LBS | TEMPERATURE: 98 F | HEIGHT: 72 IN | DIASTOLIC BLOOD PRESSURE: 84 MMHG

## 2022-04-08 DIAGNOSIS — Z01.818 ENCOUNTER FOR OTHER PREPROCEDURAL EXAMINATION: ICD-10-CM

## 2022-04-08 DIAGNOSIS — Z98.890 OTHER SPECIFIED POSTPROCEDURAL STATES: Chronic | ICD-10-CM

## 2022-04-08 DIAGNOSIS — C44.612 BASAL CELL CARCINOMA OF SKIN OF RIGHT UPPER LIMB, INCLUDING SHOULDER: ICD-10-CM

## 2022-04-08 LAB
A1C WITH ESTIMATED AVERAGE GLUCOSE RESULT: 8.6 % — HIGH (ref 4–5.6)
ALBUMIN SERPL ELPH-MCNC: 4.6 G/DL — SIGNIFICANT CHANGE UP (ref 3.5–5.2)
ALP SERPL-CCNC: 54 U/L — SIGNIFICANT CHANGE UP (ref 30–115)
ALT FLD-CCNC: 84 U/L — HIGH (ref 0–41)
ANION GAP SERPL CALC-SCNC: 17 MMOL/L — HIGH (ref 7–14)
APTT BLD: 29 SEC — SIGNIFICANT CHANGE UP (ref 27–39.2)
AST SERPL-CCNC: 57 U/L — HIGH (ref 0–41)
BASOPHILS # BLD AUTO: 0.06 K/UL — SIGNIFICANT CHANGE UP (ref 0–0.2)
BASOPHILS NFR BLD AUTO: 0.8 % — SIGNIFICANT CHANGE UP (ref 0–1)
BILIRUB SERPL-MCNC: 0.5 MG/DL — SIGNIFICANT CHANGE UP (ref 0.2–1.2)
BUN SERPL-MCNC: 20 MG/DL — SIGNIFICANT CHANGE UP (ref 10–20)
CALCIUM SERPL-MCNC: 9.9 MG/DL — SIGNIFICANT CHANGE UP (ref 8.5–10.1)
CHLORIDE SERPL-SCNC: 98 MMOL/L — SIGNIFICANT CHANGE UP (ref 98–110)
CO2 SERPL-SCNC: 21 MMOL/L — SIGNIFICANT CHANGE UP (ref 17–32)
CREAT SERPL-MCNC: 1 MG/DL — SIGNIFICANT CHANGE UP (ref 0.7–1.5)
EGFR: 87 ML/MIN/1.73M2 — SIGNIFICANT CHANGE UP
EOSINOPHIL # BLD AUTO: 0.14 K/UL — SIGNIFICANT CHANGE UP (ref 0–0.7)
EOSINOPHIL NFR BLD AUTO: 1.9 % — SIGNIFICANT CHANGE UP (ref 0–8)
ESTIMATED AVERAGE GLUCOSE: 200 MG/DL — HIGH (ref 68–114)
GLUCOSE SERPL-MCNC: 210 MG/DL — HIGH (ref 70–99)
HCT VFR BLD CALC: 46.3 % — SIGNIFICANT CHANGE UP (ref 42–52)
HGB BLD-MCNC: 16.5 G/DL — SIGNIFICANT CHANGE UP (ref 14–18)
IMM GRANULOCYTES NFR BLD AUTO: 0.3 % — SIGNIFICANT CHANGE UP (ref 0.1–0.3)
INR BLD: 0.95 RATIO — SIGNIFICANT CHANGE UP (ref 0.65–1.3)
LYMPHOCYTES # BLD AUTO: 1.95 K/UL — SIGNIFICANT CHANGE UP (ref 1.2–3.4)
LYMPHOCYTES # BLD AUTO: 25.8 % — SIGNIFICANT CHANGE UP (ref 20.5–51.1)
MCHC RBC-ENTMCNC: 31.5 PG — HIGH (ref 27–31)
MCHC RBC-ENTMCNC: 35.6 G/DL — SIGNIFICANT CHANGE UP (ref 32–37)
MCV RBC AUTO: 88.4 FL — SIGNIFICANT CHANGE UP (ref 80–94)
MONOCYTES # BLD AUTO: 0.66 K/UL — HIGH (ref 0.1–0.6)
MONOCYTES NFR BLD AUTO: 8.7 % — SIGNIFICANT CHANGE UP (ref 1.7–9.3)
NEUTROPHILS # BLD AUTO: 4.73 K/UL — SIGNIFICANT CHANGE UP (ref 1.4–6.5)
NEUTROPHILS NFR BLD AUTO: 62.5 % — SIGNIFICANT CHANGE UP (ref 42.2–75.2)
NRBC # BLD: 0 /100 WBCS — SIGNIFICANT CHANGE UP (ref 0–0)
PLATELET # BLD AUTO: 198 K/UL — SIGNIFICANT CHANGE UP (ref 130–400)
POTASSIUM SERPL-MCNC: 4 MMOL/L — SIGNIFICANT CHANGE UP (ref 3.5–5)
POTASSIUM SERPL-SCNC: 4 MMOL/L — SIGNIFICANT CHANGE UP (ref 3.5–5)
PROT SERPL-MCNC: 7.6 G/DL — SIGNIFICANT CHANGE UP (ref 6–8)
PROTHROM AB SERPL-ACNC: 10.9 SEC — SIGNIFICANT CHANGE UP (ref 9.95–12.87)
RBC # BLD: 5.24 M/UL — SIGNIFICANT CHANGE UP (ref 4.7–6.1)
RBC # FLD: 12.1 % — SIGNIFICANT CHANGE UP (ref 11.5–14.5)
SODIUM SERPL-SCNC: 136 MMOL/L — SIGNIFICANT CHANGE UP (ref 135–146)
WBC # BLD: 7.56 K/UL — SIGNIFICANT CHANGE UP (ref 4.8–10.8)
WBC # FLD AUTO: 7.56 K/UL — SIGNIFICANT CHANGE UP (ref 4.8–10.8)

## 2022-04-08 PROCEDURE — 71046 X-RAY EXAM CHEST 2 VIEWS: CPT | Mod: 26

## 2022-04-08 PROCEDURE — 93010 ELECTROCARDIOGRAM REPORT: CPT

## 2022-04-08 NOTE — H&P PST ADULT - NSICDXPASTMEDICALHX_GEN_ALL_CORE_FT
PAST MEDICAL HISTORY:  DM (diabetes mellitus)      PAST MEDICAL HISTORY:  DM (diabetes mellitus)     HTN (hypertension)

## 2022-04-08 NOTE — H&P PST ADULT - HISTORY OF PRESENT ILLNESS
56 Y/O MALE PT TO PAST WITH C/O BASAL CELL RIGHT SHOULDER FOR              PT NOW FOR SCHEDULED PROCEDURE. PT DENIES ANY CP SOB PALP COUGH DYSURIA FEVER URI. PT ABLE TO LEA 1-2 FOS W/O SOB  pt denies any covid s/s, or tested positive in the past  pt advised self quarantine till day of procedure  Anesthesia Alert  NO--Difficult Airway  NO--History of neck surgery or radiation  NO--Limited ROM of neck  NO--History of Malignant hyperthermia  NO--Personal or family history of Pseudocholinesterase deficiency.  NO--Prior Anesthesia Complication  NO--Latex Allergy  NO--Loose teeth  NO--History of Rheumatoid Arthritis  NO--ASIA  NO--Bleeding risk  NO--Other_____     56 Y/O MALE PT TO PAST WITH C/O BASAL CELL RIGHT SHOULDER FOR  FEW YEAR. STARTED BLEEDING , INCREASE IN SIZE AND PAIN  PAST 6 MO  PT NOW FOR SCHEDULED PROCEDURE ( EXCISION OF RIGHT SUPRACLAVICULAR BASAL CELL CA) . PT DENIES ANY CP SOB PALP COUGH DYSURIA FEVER URI. PT ABLE TO LEA 1-2 FOS W/O SOB  pt denies any covid s/s, or tested positive in the past  pt advised self quarantine till day of procedure  Anesthesia Alert  NO--Difficult Airway  NO--History of neck surgery or radiation  NO--Limited ROM of neck  NO--History of Malignant hyperthermia  NO--Personal or family history of Pseudocholinesterase deficiency.  NO--Prior Anesthesia Complication  NO--Latex Allergy  NO--Loose teeth  NO--History of Rheumatoid Arthritis  NO--ASIA  NO--Bleeding risk  NO--Other_____     56 Y/O MALE PT TO PAST WITH C/O BASAL CELL RIGHT SHOULDER FOR  FEW YEARS . STARTED BLEEDING , INCREASE IN SIZE AND PAIN  PAST 6 MO  PT NOW FOR SCHEDULED PROCEDURE ( EXCISION OF RIGHT SUPRACLAVICULAR BASAL CELL CA) . PT DENIES ANY CP SOB PALP COUGH DYSURIA FEVER URI. PT ABLE TO LEA 1-2 FOS W/O SOB  pt denies any covid s/s, or tested positive in the past  pt advised self quarantine till day of procedure  Anesthesia Alert  NO--Difficult Airway  NO--History of neck surgery or radiation  NO--Limited ROM of neck  NO--History of Malignant hyperthermia  NO--Personal or family history of Pseudocholinesterase deficiency.  NO--Prior Anesthesia Complication  NO--Latex Allergy  NO--Loose teeth  NO--History of Rheumatoid Arthritis  NO--ASIA  NO--Bleeding risk  NO--Other_____     56 Y/O MALE PT TO PAST WITH C/O BASAL CELL RIGHT SHOULDER FOR  FEW YEARS . STARTED BLEEDING , INCREASE IN SIZE AND PAIN  PAST 6 MO  PT NOW FOR SCHEDULED PROCEDURE ( EXCISION OF RIGHT SUPRACLAVICULAR BASAL CELL CA) . PT DENIES ANY CP SOB PALP COUGH DYSURIA FEVER URI. PT ABLE TO LEA 1-2 FOS W/O SOB  pt denies any covid s/s, COVID + 1/22  pt advised self quarantine till day of procedure  Anesthesia Alert  CLASS IV  DENIES ASIA  NO--History of neck surgery or radiation  NO--Limited ROM of neck  NO--History of Malignant hyperthermia  NO--Personal or family history of Pseudocholinesterase deficiency.  NO--Prior Anesthesia Complication  NO--Latex Allergy  NO--Loose teeth  NO--History of Rheumatoid Arthritis  NO--ASIA  NO--Bleeding risk  NO--Other_____

## 2022-04-10 ENCOUNTER — LABORATORY RESULT (OUTPATIENT)
Age: 57
End: 2022-04-10

## 2022-04-11 NOTE — PLAN
[FreeTextEntry1] : 56 year old male with a PMHx of: DMII , HTN comes in for follow up \par \par # Dyspnea on exertion / no chest pain\par - reports able to work for 12 hours without feeling short of breath \par - able to walk at least one block with no chest pain or shortness of breath\par - one year ago had stress test and TTE reports normal results with Dr. Clark\par - he called to schedule appointment with cardiology and waiting for call back\par - EKG on this visit: no ST changes or T wave inversions , sinus tachycardia ( reports running to the appointment)\par - refuses aspirin and atorvastatin , risks and benefits explained \par \par #DM\par - A1c 7.9% \par - patient takes metformin 850 mg bid , however experiences diarrhea, switch to sitagliptin 100 mg QD \par - refuses  atorvastatin\par - refuses to see podiatry now but will give referral \par - sees ophthalmology , recently , per patient no macular degeneration \par \par # Skin lesion \par - Dermatology referral \par \par # HTN , controlled\par - c/w lisnopril 5 mg qd \par - monitor blood pressure at home, patient reports 130 systolic at home on this dose \par \par #OA\par #B/l meniscal tear\par - has not been taking meloxicam\par - asymptomatic \par \par # Health maintenance \par - colonoscopy: done in 2019 --> WNL as per patient \par - RTC in 3 months with repeat A1c

## 2022-04-11 NOTE — ADDENDUM
[FreeTextEntry1] : 04/11/22.\par Preoperative labs and EKG were reviewed.\par Patient has no medical contraindications for the proposed procedure.

## 2022-04-11 NOTE — HISTORY OF PRESENT ILLNESS
[FreeTextEntry1] : follow up  [de-identified] : 58 yo f with pmh of DM and HTN presents for follow up visit. The patient reports that he has been having chronic diarrhea bot assiciated with abdominal pain and has been present for many years while on metformin. 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 No chest pain. He had a stress test and TTE done last year and reports it was normal with Dr. Clark. He reports his blood pressure medication was increased during last visit to 10 mg lisinopril but he was feeling dizzy on that dose and went back to taking 5 mg a day and reports no dizziness on this dose. The patient called Dr. Clark to schedule an appointment for dyspnea on exertion. All other ros negative.

## 2022-04-13 ENCOUNTER — APPOINTMENT (OUTPATIENT)
Dept: PLASTIC SURGERY | Facility: AMBULATORY SURGERY CENTER | Age: 57
End: 2022-04-13
Payer: MEDICAID

## 2022-04-13 ENCOUNTER — RESULT REVIEW (OUTPATIENT)
Age: 57
End: 2022-04-13

## 2022-04-13 ENCOUNTER — TRANSCRIPTION ENCOUNTER (OUTPATIENT)
Age: 57
End: 2022-04-13

## 2022-04-13 ENCOUNTER — LABORATORY RESULT (OUTPATIENT)
Age: 57
End: 2022-04-13

## 2022-04-13 ENCOUNTER — OUTPATIENT (OUTPATIENT)
Dept: OUTPATIENT SERVICES | Facility: HOSPITAL | Age: 57
LOS: 1 days | Discharge: HOME | End: 2022-04-13
Payer: MEDICAID

## 2022-04-13 VITALS
DIASTOLIC BLOOD PRESSURE: 81 MMHG | RESPIRATION RATE: 18 BRPM | HEIGHT: 72 IN | TEMPERATURE: 99 F | HEART RATE: 88 BPM | OXYGEN SATURATION: 100 % | SYSTOLIC BLOOD PRESSURE: 148 MMHG | WEIGHT: 265 LBS

## 2022-04-13 VITALS
DIASTOLIC BLOOD PRESSURE: 70 MMHG | SYSTOLIC BLOOD PRESSURE: 137 MMHG | RESPIRATION RATE: 15 BRPM | OXYGEN SATURATION: 97 % | HEART RATE: 75 BPM

## 2022-04-13 DIAGNOSIS — E11.9 TYPE 2 DIABETES MELLITUS WITHOUT COMPLICATIONS: ICD-10-CM

## 2022-04-13 DIAGNOSIS — Z98.890 OTHER SPECIFIED POSTPROCEDURAL STATES: Chronic | ICD-10-CM

## 2022-04-13 DIAGNOSIS — C44.612 BASAL CELL CARCINOMA OF SKIN OF RIGHT UPPER LIMB, INCLUDING SHOULDER: ICD-10-CM

## 2022-04-13 DIAGNOSIS — I10 ESSENTIAL (PRIMARY) HYPERTENSION: ICD-10-CM

## 2022-04-13 LAB — GLUCOSE BLDC GLUCOMTR-MCNC: 237 MG/DL — HIGH (ref 70–99)

## 2022-04-13 PROCEDURE — 88307 TISSUE EXAM BY PATHOLOGIST: CPT | Mod: 26

## 2022-04-13 PROCEDURE — 14020 TIS TRNFR S/A/L 10 SQ CM/<: CPT

## 2022-04-13 PROCEDURE — 11603 EXC TR-EXT MAL+MARG 2.1-3 CM: CPT | Mod: 59

## 2022-04-13 RX ORDER — ONDANSETRON 8 MG/1
4 TABLET, FILM COATED ORAL ONCE
Refills: 0 | Status: DISCONTINUED | OUTPATIENT
Start: 2022-04-13 | End: 2022-04-27

## 2022-04-13 RX ORDER — SODIUM CHLORIDE 9 MG/ML
1000 INJECTION, SOLUTION INTRAVENOUS
Refills: 0 | Status: DISCONTINUED | OUTPATIENT
Start: 2022-04-13 | End: 2022-04-27

## 2022-04-13 RX ORDER — TRAMADOL HYDROCHLORIDE 50 MG/1
1 TABLET ORAL
Qty: 10 | Refills: 0
Start: 2022-04-13 | End: 2022-04-15

## 2022-04-13 RX ORDER — CEPHALEXIN 500 MG
1 CAPSULE ORAL
Qty: 9 | Refills: 0
Start: 2022-04-13 | End: 2022-04-15

## 2022-04-13 RX ORDER — MEPERIDINE HYDROCHLORIDE 50 MG/ML
12.5 INJECTION INTRAMUSCULAR; INTRAVENOUS; SUBCUTANEOUS ONCE
Refills: 0 | Status: DISCONTINUED | OUTPATIENT
Start: 2022-04-13 | End: 2022-04-13

## 2022-04-13 RX ORDER — HYDROMORPHONE HYDROCHLORIDE 2 MG/ML
0.5 INJECTION INTRAMUSCULAR; INTRAVENOUS; SUBCUTANEOUS
Refills: 0 | Status: DISCONTINUED | OUTPATIENT
Start: 2022-04-13 | End: 2022-04-13

## 2022-04-13 RX ORDER — HYDROMORPHONE HYDROCHLORIDE 2 MG/ML
1 INJECTION INTRAMUSCULAR; INTRAVENOUS; SUBCUTANEOUS
Refills: 0 | Status: DISCONTINUED | OUTPATIENT
Start: 2022-04-13 | End: 2022-04-13

## 2022-04-13 RX ADMIN — SODIUM CHLORIDE 100 MILLILITER(S): 9 INJECTION, SOLUTION INTRAVENOUS at 11:40

## 2022-04-13 NOTE — ASU DISCHARGE PLAN (ADULT/PEDIATRIC) - CARE PROVIDER_API CALL
Delfin Abraham)  Plastic Surgery; Surgery of the Hand  [address] [place] [address], [address]  [place] [address]  Phone: [phone]  Fax: [phone]  Follow Up Time:

## 2022-04-13 NOTE — ASU DISCHARGE PLAN (ADULT/PEDIATRIC) - ASU DC SPECIAL INSTRUCTIONSFT
Keep surgical site clean and dry.   Do not remove any dressings or get them wet.   Sleep on your back with head elevated to reduce swelling.   Do not be alarmed by bruising, swelling and minor bleeding, that's expected.   May apply ice pack on-and-off for the first 24 hours to reduce swelling.   Rest, no lifting.

## 2022-04-13 NOTE — ASU DISCHARGE PLAN (ADULT/PEDIATRIC) - PAIN MANAGEMENT
Keflex, Tramadol for severe pain as needed/Prescriptions [facility] submitted to pharmacy [facility]

## 2022-04-13 NOTE — CHART NOTE - NSCHARTNOTEFT_GEN_A_CORE
PACU ANESTHESIA ADMISSION NOTE      Procedure: BCC excision  Post op diagnosis:      ____  Intubated  TV:______       Rate: ______      FiO2: ______    __x__  Patent Airway    __x__  Full return of protective reflexes    __x__  Full recovery from anesthesia / back to baseline status    Vitals:  T(C): 37.1 (04-13-22 @ 09:25), Max: 37.1 (04-13-22 @ 08:05)  HR: 88 (04-13-22 @ 09:25) (88 - 88)  BP: 148/81 (04-13-22 @ 09:25) (148/81 - 148/81)  RR: 18 (04-13-22 @ 09:25) (18 - 18)  SpO2: 100% (04-13-22 @ 09:25) (100% - 100%)    Mental Status:  __x__ Awake   ___x__ Alert   _____ Drowsy   _____ Sedated    Nausea/Vomiting:  __x__ NO  ______Yes,   See Post - Op Orders          Pain Scale (0-10):  _____    Treatment: ____ None    __x__ See Post - Op/PCA Orders    Post - Operative Fluids:   ____ Oral   __x__ See Post - Op Orders    Plan: Discharge:   x____Home       _____Floor     _____Critical Care    _____  Other:_________________    Comments: Patient had smooth intraoperative event, no anesthesia complication.  PACU Vital signs: HR:            BP:        /          RR:             O2 Sat:       %     Temp

## 2022-04-13 NOTE — ASU DISCHARGE PLAN (ADULT/PEDIATRIC) - NS MD DC FALL RISK RISK
For information on Fall & Injury Prevention, visit: [email or web address] OR  [email or web address] OR  https://www.cdc.gov/steadi/patient.html

## 2022-04-13 NOTE — BRIEF OPERATIVE NOTE - NSICDXBRIEFPROCEDURE_GEN_ALL_CORE_FT
PROCEDURES:  Excision of basal cell carcinoma of torso with flap closure 13-Apr-2022 10:53:41  Joan Crawford

## 2022-04-18 LAB — SURGICAL PATHOLOGY STUDY: SIGNIFICANT CHANGE UP

## 2022-04-21 ENCOUNTER — APPOINTMENT (OUTPATIENT)
Dept: PLASTIC SURGERY | Facility: CLINIC | Age: 57
End: 2022-04-21
Payer: MEDICAID

## 2022-04-21 PROBLEM — I10 ESSENTIAL (PRIMARY) HYPERTENSION: Chronic | Status: ACTIVE | Noted: 2022-04-08

## 2022-04-21 PROCEDURE — 99024 POSTOP FOLLOW-UP VISIT: CPT

## 2022-04-21 NOTE — DATA REVIEWED
[FreeTextEntry1] :  Pathology             Final\par \par No Documents Attached\par \par \par \par   [facility] Accession Number : [id number]\par Patient:   PEDRITO DIAZ\par \par \par Accession:                             [id number]\par \par Collected Date/Time:                   4/13/2022 10:00 EDT\par Received Date/Time:                    4/14/2022 09:45 EDT\par \par Surgical Pathology Report - Auth (Verified)\par \par Specimen(s) Submitted\par Right supraclavicular BCC (stitch marks 12 o'clock border)\par \par Final Diagnosis\par Right supraclavicular BCC:\par -  Residual basal cell carcinoma, completely excised.\par -  Closest peripheral resection margin is 0.7 cm from the tumor.\par -  Previous biopsy site changes including fibrosis and chronic\par \par inflammation.\par Verified by: Angelique Mckenzie M.D.\par (Electronic Signature)\par Reported on: 04/18/22 14:01 EDT, [facility],\par [address] [place] [address]\par Phone: [phone]   Fax: [phone]\par _________________________________________________________________\par \par \par Clinical Information\par Excision\par See outside slide review\par COVID (-)\par \par Perioperative Diagnosis\par Right supraclavicular BCC\par \par Gross Description\par 1. The specimen is received in formalin, labeled "right supraclavicular\par BCC" and consists of an ellipse of skin, measuring 12 to 6- 5 cm 9 to 3-\par 2.5 cm and the subcutaneous tissue- 2 cm.  The specimen oriented with a\par stitch at 12:00.  The specimen is inked as follows: 12-3-6:00 - blue,\par 6-9-12:00 - sae, deep - black.  On the center of the ellipse of skin,\par there is a irregular slightly pigmented lesion identified, measuring\par 0.5 x 0.2 cm.  It is located 2 cm away from 12:00, 1 cm away from 3:00,\par 2.2 cm away from 6:00, 0.8 cm away from 9:00.  The specimen is serially\par sectioned from 12-6 o'clock. Cut sections show the lesion is invading 0.1\par cm in maximum depth (1F-1G). The specimen submitted entirely.\par \par Summary of sections:\par 1A- 12 o'clock tip- 1\par 1B-1L- one full cross section each- 11\par 1M- 6 o'clock tip-1\par \par Total: 13 Blocks\par \par \par Specimen was received and underwent gross examination at [facility], [address] [place] [address].\par \par 04/14/2022 20:10:23 EDT bc\par \par  \par \par  Ordered by: KEVIN DANIELSON IV       Collected/Examined: 13Apr2022 10:00AM       \par Verification Required       Stage: Final       \par  Performed at: [facility]       Resulted: 18Apr2022 02:01PM       Last Updated: 18Apr2022 02:02PM       Accession: [id number]

## 2022-04-21 NOTE — HISTORY OF PRESENT ILLNESS
[FreeTextEntry1] : 56 yo M with PMHx of HTN and DM Type II who presents today for evaluation of recently diagnosed BCC of right  shoulder. Pt reports having a small red irritated skin lesion for the past 2-3 years increasing in size and starting to bleed spontaneously. Office biopsy by Dr. Ramos in February 2022 revealed BCC. Patient presents today for evaluation and reconstructive options. \par Denies any personal or family h/o skin cancer. \par \par \par Occupation -  \par Nonsmoker \par \par Interval hx (4/21/22). Patient presents today POD#8 s/p excision of right shoulder BCC with LTR. Doing well. Denies any significant pain, f/c or drainage.

## 2022-04-21 NOTE — ASSESSMENT
[FreeTextEntry1] : 58 yo diabetic M with recently diagnosed BCC of right shoulder requiring WLE with soft tissue reconstruction. \par Now POD#8 s/p excision with LTR. Doing well. \par \par - dressing changed\par - pathology discussed - BCC completely excised\par - post-op instructions discussed and all questions answered\par - f/u next week for suture removal \par \par Due to COVID 19, pre-visit patient instructions were explained to the patient and their symptoms were checked upon arrival.  \par Masks were used by the health care providers and staff and the examination room was cleaned after the patient visit was completed.\par \par \par \par

## 2022-04-21 NOTE — PHYSICAL EXAM
[de-identified] : well developed overweight male, NAD [de-identified] : unlabored breathing  [de-identified] : SHELLIER [de-identified] : Right shoulder - incision healing well, c/d/i, minimal swelling, no erythema or fluid collection

## 2022-04-28 ENCOUNTER — APPOINTMENT (OUTPATIENT)
Dept: PLASTIC SURGERY | Facility: CLINIC | Age: 57
End: 2022-04-28
Payer: MEDICAID

## 2022-04-28 DIAGNOSIS — C44.612 BASAL CELL CARCINOMA OF SKIN OF RIGHT UPPER LIMB, INCLUDING SHOULDER: ICD-10-CM

## 2022-04-28 PROCEDURE — 99024 POSTOP FOLLOW-UP VISIT: CPT

## 2022-04-28 NOTE — PHYSICAL EXAM
[de-identified] : well developed overweight male, NAD [de-identified] : unlabored breathing  [de-identified] : SHELLIER [de-identified] : Right shoulder - incision healing well, c/d/i, minimal swelling, no erythema or fluid collection

## 2022-04-28 NOTE — HISTORY OF PRESENT ILLNESS
[FreeTextEntry1] : 56 yo M with PMHx of HTN and DM Type II who presents today for evaluation of recently diagnosed BCC of right  shoulder. Pt reports having a small red irritated skin lesion for the past 2-3 years increasing in size and starting to bleed spontaneously. Office biopsy by Dr. Ramos in February 2022 revealed BCC. Patient presents today for evaluation and reconstructive options. \par Denies any personal or family h/o skin cancer. \par \par \par Occupation -  \par Nonsmoker \par \par Interval hx (4/21/22). Patient presents today POD#8 s/p excision of right shoulder BCC with LTR. Doing well. Denies any significant pain, f/c or drainage. \par \par Interval hx (4/28/22). Patient presents today POD#15 s/p excision of right shoulder BCC with LTR c/o periincisional numbness. Denies any pain, f/c or bleeding from the site. Happy with results.

## 2022-04-28 NOTE — DATA REVIEWED
[FreeTextEntry1] :  Pathology             Final\par \par No Documents Attached\par \par \par \par   [facility] Accession Number : [id number]\par Patient:   PEDRITO DIAZ\par \par \par Accession:                             [id number]\par \par Collected Date/Time:                   4/13/2022 10:00 EDT\par Received Date/Time:                    4/14/2022 09:45 EDT\par \par Surgical Pathology Report - Auth (Verified)\par \par Specimen(s) Submitted\par Right supraclavicular BCC (stitch marks 12 o'clock border)\par \par Final Diagnosis\par Right supraclavicular BCC:\par -  Residual basal cell carcinoma, completely excised.\par -  Closest peripheral resection margin is 0.7 cm from the tumor.\par -  Previous biopsy site changes including fibrosis and chronic\par \par inflammation.\par Verified by: Angelique Mckenzie M.D.\par (Electronic Signature)\par Reported on: 04/18/22 14:01 EDT, [facility],\par [address] [place] [address]\par Phone: [phone]   Fax: [phone]\par _________________________________________________________________\par \par \par Clinical Information\par Excision\par See outside slide review\par COVID (-)\par \par Perioperative Diagnosis\par Right supraclavicular BCC\par \par Gross Description\par 1. The specimen is received in formalin, labeled "right supraclavicular\par BCC" and consists of an ellipse of skin, measuring 12 to 6- 5 cm 9 to 3-\par 2.5 cm and the subcutaneous tissue- 2 cm.  The specimen oriented with a\par stitch at 12:00.  The specimen is inked as follows: 12-3-6:00 - blue,\par 6-9-12:00 - sae, deep - black.  On the center of the ellipse of skin,\par there is a irregular slightly pigmented lesion identified, measuring\par 0.5 x 0.2 cm.  It is located 2 cm away from 12:00, 1 cm away from 3:00,\par 2.2 cm away from 6:00, 0.8 cm away from 9:00.  The specimen is serially\par sectioned from 12-6 o'clock. Cut sections show the lesion is invading 0.1\par cm in maximum depth (1F-1G). The specimen submitted entirely.\par \par Summary of sections:\par 1A- 12 o'clock tip- 1\par 1B-1L- one full cross section each- 11\par 1M- 6 o'clock tip-1\par \par Total: 13 Blocks\par \par \par Specimen was received and underwent gross examination at [facility], [address] [place] [address].\par \par 04/14/2022 20:10:23 EDT bc\par \par  \par \par  Ordered by: KEVIN DANIELSON IV       Collected/Examined: 13Apr2022 10:00AM       \par Verification Required       Stage: Final       \par  Performed at: [facility]       Resulted: 18Apr2022 02:01PM       Last Updated: 18Apr2022 02:02PM       Accession: [id number]

## 2022-04-28 NOTE — ASSESSMENT
[FreeTextEntry1] : 56 yo diabetic M with recently diagnosed BCC of right shoulder requiring WLE with soft tissue reconstruction. \par Now POD#15 s/p excision with LTR. Doing well. \par \par - sutures removed, steri strips applied\par - pathology discussed - BCC completely excised\par - post-op instructions discussed and all questions answered\par - reassured about skin numbness \par - dermatologic surveillance \par - f/u 2 months with Dr. Abraham\par \par Due to COVID 19, pre-visit patient instructions were explained to the patient and their symptoms were checked upon arrival.  \par Masks were used by the health care providers and staff and the examination room was cleaned after the patient visit was completed.\par \par \par \par

## 2022-05-31 ENCOUNTER — APPOINTMENT (OUTPATIENT)
Dept: DERMATOLOGY | Facility: CLINIC | Age: 57
End: 2022-05-31

## 2022-06-14 ENCOUNTER — OUTPATIENT (OUTPATIENT)
Dept: OUTPATIENT SERVICES | Facility: HOSPITAL | Age: 57
LOS: 1 days | Discharge: HOME | End: 2022-06-14
Payer: MEDICAID

## 2022-06-14 ENCOUNTER — APPOINTMENT (OUTPATIENT)
Dept: OPHTHALMOLOGY | Facility: CLINIC | Age: 57
End: 2022-06-14

## 2022-06-14 DIAGNOSIS — Z98.890 OTHER SPECIFIED POSTPROCEDURAL STATES: Chronic | ICD-10-CM

## 2022-06-14 PROCEDURE — 92012 INTRM OPH EXAM EST PATIENT: CPT

## 2022-06-22 ENCOUNTER — TRANSCRIPTION ENCOUNTER (OUTPATIENT)
Age: 57
End: 2022-06-22

## 2022-06-22 DIAGNOSIS — H50.53 VERTICAL HETEROPHORIA: ICD-10-CM

## 2022-06-22 DIAGNOSIS — E11.9 TYPE 2 DIABETES MELLITUS WITHOUT COMPLICATIONS: ICD-10-CM

## 2022-06-22 DIAGNOSIS — G43.B0 OPHTHALMOPLEGIC MIGRAINE, NOT INTRACTABLE: ICD-10-CM

## 2022-06-22 DIAGNOSIS — H50.51 ESOPHORIA: ICD-10-CM

## 2022-06-30 ENCOUNTER — APPOINTMENT (OUTPATIENT)
Dept: PLASTIC SURGERY | Facility: CLINIC | Age: 57
End: 2022-06-30

## 2022-07-14 ENCOUNTER — APPOINTMENT (OUTPATIENT)
Dept: PLASTIC SURGERY | Facility: CLINIC | Age: 57
End: 2022-07-14

## 2022-07-14 PROCEDURE — 99212 OFFICE O/P EST SF 10 MIN: CPT

## 2022-07-14 NOTE — DATA REVIEWED
[FreeTextEntry1] :  Pathology             Final\par \par No Documents Attached\par \par \par \par   [facility] Accession Number : [id number]\par Patient:   PEDRITO DIAZ\par \par \par Accession:                             [id number]\par \par Collected Date/Time:                   4/13/2022 10:00 EDT\par Received Date/Time:                    4/14/2022 09:45 EDT\par \par Surgical Pathology Report - Auth (Verified)\par \par Specimen(s) Submitted\par Right supraclavicular BCC (stitch marks 12 o'clock border)\par \par Final Diagnosis\par Right supraclavicular BCC:\par -  Residual basal cell carcinoma, completely excised.\par -  Closest peripheral resection margin is 0.7 cm from the tumor.\par -  Previous biopsy site changes including fibrosis and chronic\par \par inflammation.\par Verified by: Angelique Mckenzie M.D.\par (Electronic Signature)\par Reported on: 04/18/22 14:01 EDT, [facility],\par [address] [place] [address]\par Phone: [phone]   Fax: [phone]\par _________________________________________________________________\par \par \par Clinical Information\par Excision\par See outside slide review\par COVID (-)\par \par Perioperative Diagnosis\par Right supraclavicular BCC\par \par Gross Description\par 1. The specimen is received in formalin, labeled "right supraclavicular\par BCC" and consists of an ellipse of skin, measuring 12 to 6- 5 cm 9 to 3-\par 2.5 cm and the subcutaneous tissue- 2 cm.  The specimen oriented with a\par stitch at 12:00.  The specimen is inked as follows: 12-3-6:00 - blue,\par 6-9-12:00 - sae, deep - black.  On the center of the ellipse of skin,\par there is a irregular slightly pigmented lesion identified, measuring\par 0.5 x 0.2 cm.  It is located 2 cm away from 12:00, 1 cm away from 3:00,\par 2.2 cm away from 6:00, 0.8 cm away from 9:00.  The specimen is serially\par sectioned from 12-6 o'clock. Cut sections show the lesion is invading 0.1\par cm in maximum depth (1F-1G). The specimen submitted entirely.\par \par Summary of sections:\par 1A- 12 o'clock tip- 1\par 1B-1L- one full cross section each- 11\par 1M- 6 o'clock tip-1\par \par Total: 13 Blocks\par \par \par Specimen was received and underwent gross examination at [facility], [address] [place] [address].\par \par 04/14/2022 20:10:23 EDT bc\par \par  \par \par  Ordered by: KEVIN DANIELSON IV       Collected/Examined: 13Apr2022 10:00AM       \par Verification Required       Stage: Final       \par  Performed at: [facility]       Resulted: 18Apr2022 02:01PM       Last Updated: 18Apr2022 02:02PM       Accession: [id number]

## 2022-07-14 NOTE — HISTORY OF PRESENT ILLNESS
[FreeTextEntry1] : 58 yo M with PMHx of HTN and DM Type II who presents today for evaluation of recently diagnosed BCC of right  shoulder. Pt reports having a small red irritated skin lesion for the past 2-3 years increasing in size and starting to bleed spontaneously. Office biopsy by Dr. Ramos in February 2022 revealed BCC. Patient presents today for evaluation and reconstructive options. \par Denies any personal or family h/o skin cancer. \par \par \par Occupation -  \par Nonsmoker \par \par Interval hx (4/21/22). Patient presents today POD#8 s/p excision of right shoulder BCC with LTR. Doing well. Denies any significant pain, f/c or drainage. \par \par Interval hx (4/28/22). Patient presents today POD#15 s/p excision of right shoulder BCC with LTR c/o periincisional numbness. Denies any pain, f/c or bleeding from the site. Happy with results.

## 2022-07-14 NOTE — ASSESSMENT
[FreeTextEntry1] : 56 yo diabetic M with recently diagnosed BCC of right shoulder requiring WLE with soft tissue reconstruction. \par Now POD#15 s/p excision with LTR. Doing well. \par \par - sutures removed, steri strips applied\par - pathology discussed - BCC completely excised\par - post-op instructions discussed and all questions answered\par - reassured about skin numbness \par - dermatologic surveillance \par - f/u 2 months with Dr. Abraham\par \par Due to COVID 19, pre-visit patient instructions were explained to the patient and their symptoms were checked upon arrival.  \par Masks were used by the health care providers and staff and the examination room was cleaned after the patient visit was completed.\par \par \par \par 7/14/2022\par doing well\par no issues\par \par scarring fine, no recurrnce\par \par seeing Ligresti in a few weeks\par \par Wound care instructions given.\par \par f/u w me in 6 months\par \par Due to COVID 19, pre-visit patient instructions were explained to the patient and their symptoms were checked upon arrival.  \par Masks were used by the health care providers and staff and the examination room was cleaned after the patient visit was completed.\par

## 2022-07-14 NOTE — DATA REVIEWED
[FreeTextEntry1] :  Pathology             Final\par \par No Documents Attached\par \par \par \par   [facility] Accession Number : [id number]\par Patient:   PEDRITO DIAZ\par \par \par Accession:                             [id number]\par \par Collected Date/Time:                   4/13/2022 10:00 EDT\par Received Date/Time:                    4/14/2022 09:45 EDT\par \par Surgical Pathology Report - Auth (Verified)\par \par Specimen(s) Submitted\par Right supraclavicular BCC (stitch marks 12 o'clock border)\par \par Final Diagnosis\par Right supraclavicular BCC:\par -  Residual basal cell carcinoma, completely excised.\par -  Closest peripheral resection margin is 0.7 cm from the tumor.\par -  Previous biopsy site changes including fibrosis and chronic\par \par inflammation.\par Verified by: Angelique Mckenzie M.D.\par (Electronic Signature)\par Reported on: 04/18/22 14:01 EDT, [facility],\par [address] [place] [address]\par Phone: [phone]   Fax: [phone]\par _________________________________________________________________\par \par \par Clinical Information\par Excision\par See outside slide review\par COVID (-)\par \par Perioperative Diagnosis\par Right supraclavicular BCC\par \par Gross Description\par 1. The specimen is received in formalin, labeled "right supraclavicular\par BCC" and consists of an ellipse of skin, measuring 12 to 6- 5 cm 9 to 3-\par 2.5 cm and the subcutaneous tissue- 2 cm.  The specimen oriented with a\par stitch at 12:00.  The specimen is inked as follows: 12-3-6:00 - blue,\par 6-9-12:00 - sae, deep - black.  On the center of the ellipse of skin,\par there is a irregular slightly pigmented lesion identified, measuring\par 0.5 x 0.2 cm.  It is located 2 cm away from 12:00, 1 cm away from 3:00,\par 2.2 cm away from 6:00, 0.8 cm away from 9:00.  The specimen is serially\par sectioned from 12-6 o'clock. Cut sections show the lesion is invading 0.1\par cm in maximum depth (1F-1G). The specimen submitted entirely.\par \par Summary of sections:\par 1A- 12 o'clock tip- 1\par 1B-1L- one full cross section each- 11\par 1M- 6 o'clock tip-1\par \par Total: 13 Blocks\par \par \par Specimen was received and underwent gross examination at [facility], [address] [place] [address].\par \par 04/14/2022 20:10:23 EDT bc\par \par  \par \par  Ordered by: KEVIN DANIELSON IV       Collected/Examined: 13Apr2022 10:00AM       \par Verification Required       Stage: Final       \par  Performed at: [facility]       Resulted: 18Apr2022 02:01PM       Last Updated: 18Apr2022 02:02PM       Accession: [id number]

## 2022-07-14 NOTE — PHYSICAL EXAM
[de-identified] : well developed overweight male, NAD [de-identified] : unlabored breathing  [de-identified] : SHELLIER [de-identified] : Right shoulder - incision healing well, c/d/i, minimal swelling, no erythema or fluid collection

## 2022-07-14 NOTE — ASSESSMENT
[FreeTextEntry1] : 58 yo diabetic M with recently diagnosed BCC of right shoulder requiring WLE with soft tissue reconstruction. \par Now POD#15 s/p excision with LTR. Doing well. \par \par - sutures removed, steri strips applied\par - pathology discussed - BCC completely excised\par - post-op instructions discussed and all questions answered\par - reassured about skin numbness \par - dermatologic surveillance \par - f/u 2 months with Dr. Abraham\par \par Due to COVID 19, pre-visit patient instructions were explained to the patient and their symptoms were checked upon arrival.  \par Masks were used by the health care providers and staff and the examination room was cleaned after the patient visit was completed.\par \par \par \par 7/14/2022\par doing well\par no issues\par \par scarring fine, no recurrnce\par \par seeing Ligresti in a few weeks\par \par Wound care instructions given.\par \par f/u w me in 6 months\par \par Due to COVID 19, pre-visit patient instructions were explained to the patient and their symptoms were checked upon arrival.  \par Masks were used by the health care providers and staff and the examination room was cleaned after the patient visit was completed.\par

## 2022-07-14 NOTE — PHYSICAL EXAM
[de-identified] : well developed overweight male, NAD [de-identified] : unlabored breathing  [de-identified] : SHELLIER [de-identified] : Right shoulder - incision healing well, c/d/i, minimal swelling, no erythema or fluid collection

## 2022-07-18 ENCOUNTER — RX RENEWAL (OUTPATIENT)
Age: 57
End: 2022-07-18

## 2022-07-27 ENCOUNTER — APPOINTMENT (OUTPATIENT)
Dept: INTERNAL MEDICINE | Facility: CLINIC | Age: 57
End: 2022-07-27

## 2022-09-19 ENCOUNTER — OUTPATIENT (OUTPATIENT)
Dept: OUTPATIENT SERVICES | Facility: HOSPITAL | Age: 57
LOS: 1 days | Discharge: HOME | End: 2022-09-19

## 2022-09-19 ENCOUNTER — NON-APPOINTMENT (OUTPATIENT)
Age: 57
End: 2022-09-19

## 2022-09-19 ENCOUNTER — APPOINTMENT (OUTPATIENT)
Dept: INTERNAL MEDICINE | Facility: CLINIC | Age: 57
End: 2022-09-19

## 2022-09-19 VITALS
DIASTOLIC BLOOD PRESSURE: 106 MMHG | BODY MASS INDEX: 34.54 KG/M2 | WEIGHT: 255 LBS | OXYGEN SATURATION: 97 % | HEART RATE: 93 BPM | HEIGHT: 72 IN | SYSTOLIC BLOOD PRESSURE: 153 MMHG

## 2022-09-19 DIAGNOSIS — Z00.00 ENCOUNTER FOR GENERAL ADULT MEDICAL EXAMINATION W/OUT ABNORMAL FINDINGS: ICD-10-CM

## 2022-09-19 DIAGNOSIS — I10 ESSENTIAL (PRIMARY) HYPERTENSION: ICD-10-CM

## 2022-09-19 DIAGNOSIS — E11.9 TYPE 2 DIABETES MELLITUS W/OUT COMPLICATIONS: ICD-10-CM

## 2022-09-19 DIAGNOSIS — Z98.890 OTHER SPECIFIED POSTPROCEDURAL STATES: Chronic | ICD-10-CM

## 2022-09-19 PROCEDURE — 99214 OFFICE O/P EST MOD 30 MIN: CPT | Mod: GC

## 2022-09-19 RX ORDER — LISINOPRIL 20 MG/1
20 TABLET ORAL DAILY
Qty: 30 | Refills: 5 | Status: ACTIVE | COMMUNITY
Start: 2022-03-07 | End: 1900-01-01

## 2022-09-19 RX ORDER — ALOGLIPTIN 25 MG/1
25 TABLET, FILM COATED ORAL
Qty: 30 | Refills: 5 | Status: ACTIVE | COMMUNITY
Start: 2022-03-09 | End: 1900-01-01

## 2022-09-19 RX ORDER — LINAGLIPTIN 5 MG/1
5 TABLET, FILM COATED ORAL
Qty: 30 | Refills: 3 | Status: COMPLETED | COMMUNITY
Start: 2022-09-19 | End: 2022-09-19

## 2022-09-19 NOTE — PHYSICAL EXAM
[No Acute Distress] : no acute distress [Well Nourished] : well nourished [Normal Sclera/Conjunctiva] : normal sclera/conjunctiva [No JVD] : no jugular venous distention [No Lymphadenopathy] : no lymphadenopathy [No Respiratory Distress] : no respiratory distress  [No Accessory Muscle Use] : no accessory muscle use [Normal Rate] : normal rate  [Regular Rhythm] : with a regular rhythm [Normal S1, S2] : normal S1 and S2

## 2022-09-21 LAB
ALBUMIN SERPL ELPH-MCNC: 4.7 G/DL
ALP BLD-CCNC: 60 U/L
ALT SERPL-CCNC: 54 U/L
ANION GAP SERPL CALC-SCNC: 15 MMOL/L
AST SERPL-CCNC: 33 U/L
BILIRUB SERPL-MCNC: 0.6 MG/DL
BUN SERPL-MCNC: 16 MG/DL
CALCIUM SERPL-MCNC: 10.3 MG/DL
CHLORIDE SERPL-SCNC: 101 MMOL/L
CHOLEST SERPL-MCNC: 222 MG/DL
CO2 SERPL-SCNC: 23 MMOL/L
CREAT SERPL-MCNC: 1 MG/DL
EGFR: 87 ML/MIN/1.73M2
ESTIMATED AVERAGE GLUCOSE: 209 MG/DL
GLUCOSE SERPL-MCNC: 160 MG/DL
HBA1C MFR BLD HPLC: 8.9 %
HDLC SERPL-MCNC: 58 MG/DL
LDLC SERPL CALC-MCNC: 117 MG/DL
NONHDLC SERPL-MCNC: 164 MG/DL
POTASSIUM SERPL-SCNC: 4.8 MMOL/L
PROT SERPL-MCNC: 7.4 G/DL
SODIUM SERPL-SCNC: 139 MMOL/L
TRIGL SERPL-MCNC: 236 MG/DL

## 2022-09-23 DIAGNOSIS — Z00.00 ENCOUNTER FOR GENERAL ADULT MEDICAL EXAMINATION WITHOUT ABNORMAL FINDINGS: ICD-10-CM

## 2022-09-23 DIAGNOSIS — E11.9 TYPE 2 DIABETES MELLITUS WITHOUT COMPLICATIONS: ICD-10-CM

## 2022-09-23 DIAGNOSIS — I10 ESSENTIAL (PRIMARY) HYPERTENSION: ICD-10-CM

## 2022-09-30 NOTE — REVIEW OF SYSTEMS
[Fever] : no fever [Chills] : no chills [Discharge] : no discharge [Earache] : no earache [Chest Pain] : no chest pain [Palpitations] : no palpitations [Claudication] : no  leg claudication [Cough] : no cough [Abdominal Pain] : no abdominal pain [Nausea] : no nausea [Dysuria] : no dysuria [Incontinence] : no incontinence [Hesitancy] : no hesitancy [Joint Pain] : no joint pain [Joint Stiffness] : no joint stiffness

## 2022-09-30 NOTE — HISTORY OF PRESENT ILLNESS
[FreeTextEntry1] : Follow up appointment [de-identified] : 56 yo f with pmh of DM and HTN presents for follow up visit.  Pt is active and going to the gym; diet consists of cereal, vegetables and doesn't eat fast food. At home, BP is usually 120 and 130. In office, BP is 153/106 and Pt is not compliant with lisinopril.

## 2022-09-30 NOTE — ASSESSMENT
[FreeTextEntry1] : 56 year old male with a PMHx of: DMII , HTN comes in for follow up appointment.\par \par #DM\par - A1c 8.6% 04/2022\par - patient takes metformin 850 mg bid , however experiences diarrhea, switch to sitagliptin 25 mg daily \par - refuses atorvastatin\par - refuses to see podiatry now but will give referral \par - sees ophthalmology\par \par # HTN\par - /96, pt is non-compliant with medications\par - changed lisnopril 5 mg qd to 20mg daily\par - monitor blood pressure at home, patient reports 130 systolic at home on this dose \par \par # BCC of the left shoulder\par -following up with surgery\par \par # Health maintenance \par - colonoscopy: done in 2019 --> WNL as per patient \par - RTC in 3 weeks with repeat A1c. \par

## 2022-10-17 ENCOUNTER — APPOINTMENT (OUTPATIENT)
Dept: INTERNAL MEDICINE | Facility: CLINIC | Age: 57
End: 2022-10-17

## 2022-11-15 ENCOUNTER — APPOINTMENT (OUTPATIENT)
Dept: OPHTHALMOLOGY | Facility: CLINIC | Age: 57
End: 2022-11-15

## 2022-11-17 NOTE — H&P PST ADULT - NS NEC GEN PE MLT EXAM PC
No bruits; no thyromegaly or nodules Skin Substitute Text: The defect edges were debeveled with a #15 scalpel blade.  Given the location of the defect, shape of the defect and the proximity to free margins a skin substitute graft was deemed most appropriate.  The graft material was trimmed to fit the size of the defect. The graft was then placed in the primary defect and oriented appropriately.

## 2022-11-30 NOTE — H&P PST ADULT - CARDIOVASCULAR
Patient is an 83 yo M with ESKD s/p transplant 2013 now CKD 4, glaucoma, DM1, Anemia, CAD, BPH presenting with decompensated heart failure c/b cardiac arrest on 11/26 with creatinine within baseline    Problem/Plan:  #CKD Stage 4  Renal allograft with CKD 4 - baseline sCr 2.3-2.5, usual meds tacrolimus 4mg BID and mycophenolate 500mg BID.   Recs:  - c/w AM tacrolimus to 3mg and PM dose at 2mg.  - c/w cell cept 500mg BID  -Please check tacro level at 8:30 PM tonight prior to PM dose.  -Start NS @ 60cc/hr  - strict i's and o's  - Trend BMP daily    #Anemia of chronic disease  -epo 8k units weekly   Regular rate & rhythm, normal S1, S2; no murmurs, gallops or rubs; no S3, S4 detailed exam

## 2023-01-09 ENCOUNTER — OUTPATIENT (OUTPATIENT)
Dept: OUTPATIENT SERVICES | Facility: HOSPITAL | Age: 58
LOS: 1 days | Discharge: HOME | End: 2023-01-09

## 2023-01-09 ENCOUNTER — APPOINTMENT (OUTPATIENT)
Dept: OPHTHALMOLOGY | Facility: CLINIC | Age: 58
End: 2023-01-09
Payer: MEDICAID

## 2023-01-09 DIAGNOSIS — Z98.890 OTHER SPECIFIED POSTPROCEDURAL STATES: Chronic | ICD-10-CM

## 2023-01-09 PROCEDURE — 92134 CPTRZ OPH DX IMG PST SGM RTA: CPT | Mod: 26

## 2023-01-09 PROCEDURE — 92014 COMPRE OPH EXAM EST PT 1/>: CPT

## 2023-01-12 ENCOUNTER — APPOINTMENT (OUTPATIENT)
Dept: PLASTIC SURGERY | Facility: CLINIC | Age: 58
End: 2023-01-12
Payer: MEDICAID

## 2023-01-12 DIAGNOSIS — H50.51 ESOPHORIA: ICD-10-CM

## 2023-01-12 DIAGNOSIS — H50.53 VERTICAL HETEROPHORIA: ICD-10-CM

## 2023-01-12 DIAGNOSIS — H35.033 HYPERTENSIVE RETINOPATHY, BILATERAL: ICD-10-CM

## 2023-01-12 DIAGNOSIS — E11.9 TYPE 2 DIABETES MELLITUS WITHOUT COMPLICATIONS: ICD-10-CM

## 2023-01-12 DIAGNOSIS — G43.109 MIGRAINE WITH AURA, NOT INTRACTABLE, WITHOUT STATUS MIGRAINOSUS: ICD-10-CM

## 2023-01-12 PROCEDURE — 99212 OFFICE O/P EST SF 10 MIN: CPT

## 2023-01-12 NOTE — PHYSICAL EXAM
[de-identified] : well developed overweight male, NAD [de-identified] : unlabored breathing  [de-identified] : SHELLIER [de-identified] : Right shoulder - incision healing well, c/d/i, minimal swelling, no erythema or fluid collection

## 2023-01-12 NOTE — ASSESSMENT
[FreeTextEntry1] : 58 yo diabetic M with recently diagnosed BCC of right shoulder requiring WLE with soft tissue reconstruction. \par Now POD#15 s/p excision with LTR. Doing well. \par \par - sutures removed, steri strips applied\par - pathology discussed - BCC completely excised\par - post-op instructions discussed and all questions answered\par - reassured about skin numbness \par - dermatologic surveillance \par - f/u 2 months with Dr. Abraham\par \par Due to COVID 19, pre-visit patient instructions were explained to the patient and their symptoms were checked upon arrival.  \par Masks were used by the health care providers and staff and the examination room was cleaned after the patient visit was completed.\par \par \par \par 7/14/2022\par doing well\par no issues\par \par scarring fine, no recurrnce\par \par seeing Ligresti in a few weeks\par \par Wound care instructions given.\par \par f/u w me in 6 months\par \par Due to COVID 19, pre-visit patient instructions were explained to the patient and their symptoms were checked upon arrival.  \par Masks were used by the health care providers and staff and the examination room was cleaned after the patient visit was completed.\par \par \par 1/12/2023\par rigth shoulder scar fine\par healing well s/p excision of BCC w clear margins\par no issues\par \par f/u 6 months\par \par Due to COVID 19, pre-visit patient instructions were explained to the patient and their symptoms were checked upon arrival.  \par Masks were used by the health care providers and staff and the examination room was cleaned after the patient visit was completed.\par

## 2023-01-12 NOTE — DATA REVIEWED
[FreeTextEntry1] :  Pathology             Final\par \par No Documents Attached\par \par \par \par   [facility] Accession Number : [id number]\par Patient:   PEDRITO DIAZ\par \par \par Accession:                             [id number]\par \par Collected Date/Time:                   4/13/2022 10:00 EDT\par Received Date/Time:                    4/14/2022 09:45 EDT\par \par Surgical Pathology Report - Auth (Verified)\par \par Specimen(s) Submitted\par Right supraclavicular BCC (stitch marks 12 o'clock border)\par \par Final Diagnosis\par Right supraclavicular BCC:\par -  Residual basal cell carcinoma, completely excised.\par -  Closest peripheral resection margin is 0.7 cm from the tumor.\par -  Previous biopsy site changes including fibrosis and chronic\par \par inflammation.\par Verified by: Angelique Mckenzie M.D.\par (Electronic Signature)\par Reported on: 04/18/22 14:01 EDT, [facility],\par [address] [place] [address]\par Phone: [phone]   Fax: [phone]\par _________________________________________________________________\par \par \par Clinical Information\par Excision\par See outside slide review\par COVID (-)\par \par Perioperative Diagnosis\par Right supraclavicular BCC\par \par Gross Description\par 1. The specimen is received in formalin, labeled "right supraclavicular\par BCC" and consists of an ellipse of skin, measuring 12 to 6- 5 cm 9 to 3-\par 2.5 cm and the subcutaneous tissue- 2 cm.  The specimen oriented with a\par stitch at 12:00.  The specimen is inked as follows: 12-3-6:00 - blue,\par 6-9-12:00 - sae, deep - black.  On the center of the ellipse of skin,\par there is a irregular slightly pigmented lesion identified, measuring\par 0.5 x 0.2 cm.  It is located 2 cm away from 12:00, 1 cm away from 3:00,\par 2.2 cm away from 6:00, 0.8 cm away from 9:00.  The specimen is serially\par sectioned from 12-6 o'clock. Cut sections show the lesion is invading 0.1\par cm in maximum depth (1F-1G). The specimen submitted entirely.\par \par Summary of sections:\par 1A- 12 o'clock tip- 1\par 1B-1L- one full cross section each- 11\par 1M- 6 o'clock tip-1\par \par Total: 13 Blocks\par \par \par Specimen was received and underwent gross examination at [facility], [address] [place] [address].\par \par 04/14/2022 20:10:23 EDT bc\par \par  \par \par  Ordered by: KEVIN DANIELSON IV       Collected/Examined: 13Apr2022 10:00AM       \par Verification Required       Stage: Final       \par  Performed at: [facility]       Resulted: 18Apr2022 02:01PM       Last Updated: 18Apr2022 02:02PM       Accession: [id number]

## 2023-09-22 ENCOUNTER — OUTPATIENT (OUTPATIENT)
Dept: OUTPATIENT SERVICES | Facility: HOSPITAL | Age: 58
LOS: 1 days | End: 2023-09-22
Payer: MEDICAID

## 2023-09-22 DIAGNOSIS — Z98.890 OTHER SPECIFIED POSTPROCEDURAL STATES: Chronic | ICD-10-CM

## 2023-09-22 DIAGNOSIS — R07.9 CHEST PAIN, UNSPECIFIED: ICD-10-CM

## 2023-09-22 PROCEDURE — 71046 X-RAY EXAM CHEST 2 VIEWS: CPT | Mod: 26

## 2023-09-22 PROCEDURE — 71046 X-RAY EXAM CHEST 2 VIEWS: CPT

## 2023-09-23 DIAGNOSIS — R07.9 CHEST PAIN, UNSPECIFIED: ICD-10-CM

## 2023-10-17 ENCOUNTER — OUTPATIENT (OUTPATIENT)
Dept: OUTPATIENT SERVICES | Facility: HOSPITAL | Age: 58
LOS: 1 days | End: 2023-10-17
Payer: MEDICAID

## 2023-10-17 ENCOUNTER — APPOINTMENT (OUTPATIENT)
Dept: OPHTHALMOLOGY | Facility: CLINIC | Age: 58
End: 2023-10-17
Payer: MEDICAID

## 2023-10-17 ENCOUNTER — APPOINTMENT (OUTPATIENT)
Dept: OPHTHALMOLOGY | Facility: CLINIC | Age: 58
End: 2023-10-17

## 2023-10-17 DIAGNOSIS — H53.8 OTHER VISUAL DISTURBANCES: ICD-10-CM

## 2023-10-17 DIAGNOSIS — Z98.890 OTHER SPECIFIED POSTPROCEDURAL STATES: Chronic | ICD-10-CM

## 2023-10-17 PROCEDURE — 92012 INTRM OPH EXAM EST PATIENT: CPT

## 2023-10-27 DIAGNOSIS — H50.51 ESOPHORIA: ICD-10-CM

## 2023-10-27 DIAGNOSIS — E11.9 TYPE 2 DIABETES MELLITUS WITHOUT COMPLICATIONS: ICD-10-CM

## 2023-10-27 DIAGNOSIS — H50.53 VERTICAL HETEROPHORIA: ICD-10-CM

## 2023-10-27 DIAGNOSIS — G43.B0 OPHTHALMOPLEGIC MIGRAINE, NOT INTRACTABLE: ICD-10-CM

## 2023-11-08 ENCOUNTER — EMERGENCY (EMERGENCY)
Facility: HOSPITAL | Age: 58
LOS: 0 days | Discharge: HOME | End: 2023-11-08
Attending: EMERGENCY MEDICINE
Payer: MEDICAID

## 2023-11-08 VITALS
HEART RATE: 87 BPM | TEMPERATURE: 99 F | WEIGHT: 259.93 LBS | OXYGEN SATURATION: 96 % | DIASTOLIC BLOOD PRESSURE: 72 MMHG | RESPIRATION RATE: 20 BRPM | SYSTOLIC BLOOD PRESSURE: 138 MMHG

## 2023-11-08 DIAGNOSIS — R07.81 PLEURODYNIA: ICD-10-CM
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
## 2023-11-08 DIAGNOSIS — Z98.890 OTHER SPECIFIED POSTPROCEDURAL STATES: Chronic | ICD-10-CM
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
## 2023-11-08 PROCEDURE — 71046 X-RAY EXAM CHEST 2 VIEWS: CPT | Mod: 26

## 2023-11-08 PROCEDURE — 71100 X-RAY EXAM RIBS UNI 2 VIEWS: CPT | Mod: RT

## 2023-11-08 PROCEDURE — 99284 EMERGENCY DEPT VISIT MOD MDM: CPT | Mod: 25

## 2023-11-08 PROCEDURE — 99284 EMERGENCY DEPT VISIT MOD MDM: CPT

## 2023-11-08 PROCEDURE — 71046 X-RAY EXAM CHEST 2 VIEWS: CPT

## 2023-11-08 PROCEDURE — 71100 X-RAY EXAM RIBS UNI 2 VIEWS: CPT | Mod: 26,RT

## 2023-11-08 NOTE — ED PROVIDER NOTE - OBJECTIVE STATEMENT
58-year-old male presents to the ED status post trip and fall on Saturday night hitting right chest wall on floor.  Patient continues to have right-sided rib/chest discomfort worse with movement and taking deep breath.  Patient denies any head trauma, LOC, dizziness, weakness, neck or back pain.

## 2023-11-08 NOTE — ED PROVIDER NOTE - CARE PROVIDER_API CALL
Adolfo Davis  Internal Medicine  [address] [place] [id number]  Phone: [phone]  Fax: [phone]  Follow Up Time: 1-3 Days

## 2023-11-08 NOTE — ED PROVIDER NOTE - PHYSICAL EXAMINATION
Vital Signs: I have reviewed the initial vital signs.  Constitutional: NAD, well-nourished, appears stated age, no acute distress.  HEENT: Airway patent, moist MM, no erythema/swelling/deformity of oral structures. EOMI, PERRLA.  CV: regular rate, regular rhythm, well-perfused extremities, 2+ b/l DP and radial pulses equal.  Lungs: BCTA, no increased WOB. +right anterior checst wall tenderness.  ABD: NTND, no guarding or rebound, no pulsatile mass, no hernias.   MSK: Neck supple, nontender, nl ROM, no stepoff. Chest nontender. Back nontender. Ext nontender, nl rom, no deformity.   INTEG: Skin warm, dry, no rash.  NEURO: A&Ox3, normal strength, nl sensation throughout, normal speech.

## 2023-11-08 NOTE — ED PROVIDER NOTE - NSFOLLOWUPINSTRUCTIONS_ED_ALL_ED_FT
Rib Contusion    A rib contusion is a deep bruise on your rib area. Contusions are the result of a blunt trauma that causes bleeding and injury to the tissues under the skin. A rib contusion may involve bruising of the ribs and of the skin and muscles in the area. The skin over the contusion may turn blue, purple, or yellow. Minor injuries will give you a painless contusion. More severe contusions may stay painful and swollen for a few weeks.    What are the causes?  This condition is usually caused by a blow, trauma, or direct force to an area of the body. This often occurs while playing contact sports.    What are the signs or symptoms?  Symptoms of this condition include:  Swelling and redness of the injured area.  Discoloration of the injured area.  Tenderness and soreness of the injured area.  Pain with or without movement.  How is this diagnosed?  This condition may be diagnosed based on:  Your symptoms and medical history.  A physical exam.  Imaging tests—such as an X-ray, CT scan, or MRI—to determine if there were internal injuries or broken bones (fractures).  How is this treated?  This condition may be treated with:  Rest. This is often the best treatment for a rib contusion.  Icing. This reduces swelling and inflammation.  Deep-breathing exercises. These may be recommended to reduce the risk for lung collapse and pneumonia.  Medicines. Over-the-counter or prescription medicines may be given to control pain.  Injection of a numbing medicine around the nerve near your injury (nerve block).  Follow these instructions at home:  Image Image   Medicines     Take over-the-counter and prescription medicines only as told by your health care provider.  Do not drive or use heavy machinery while taking prescription pain medicine.  If you are taking prescription pain medicine, take actions to prevent or treat constipation. Your health care provider may recommend that you:   Drink enough fluid to keep your urine pale yellow.   Eat foods that are high in fiber, such as fresh fruits and vegetables, whole grains, and beans.   Limit foods that are high in fat and processed sugars, such as fried or sweet foods.   Take an over-the-counter or prescription medicine for constipation.  Managing pain, stiffness, and swelling     If directed, put ice on the injured area:  Put ice in a plastic bag.  Place a towel between your skin and the bag.  Leave the ice on for 20 minutes, 2–3 times a day.  Rest the injured area. Avoid strenuous activity and any activities or movements that cause pain. Be careful during activities and avoid bumping the injured area.tissues under the skin.  The skin overlying the contusion may turn blue, purple, or yellow. Minor injuries may give you a painless contusion. More severe   Do not lift anything that is heavier than 5 lb (2.3 kg), or the limit that you are told, until your health care provider says that it is safe.  General instructions     Do not use any products that contain nicotine or tobacco, such as cigarettes and e-cigarettes. These can delay healing. If you need help quitting, ask your health care provider.  Do deep-breathing exercises as told by your health care provider.  If you were given an incentive spirometer, use it every 1–2 hours while you are awake, or as recommended by your health care provider. This device measures how well you are filling your lungs with each breath.  Keep all follow-up visits as told by your health care provider. This is important.  Contact a health care provider if you have:  Increased bruising or swelling.  Pain that is not controlled with treatment.  A fever.  Get help right away if you:  Have difficulty breathing or shortness of breath.  Develop a continual cough or you cough up thick or bloody sputum.  Feel nauseous or you vomit.  Have pain in your abdomen.  Summary  A rib contusion is a deep bruise on your rib area. Contusions are the result of a blunt trauma that causes bleeding and injury to the contusions may stay painful and swollen for a few weeks.  Rest the injured area. Avoid strenuous activity and any activities or movements that cause pain.  This information is not intended to replace advice given to you by your health care provider. Make sure you discuss any questions you have with your health care provider.    Document Released: 09/12/2002 Document Revised: 01/16/2019 Document Reviewed: 01/16/2019  [employer] Interactive Patient Education © 2019 [employer]

## 2023-11-08 NOTE — ED PROVIDER NOTE - CLINICAL SUMMARY MEDICAL DECISION MAKING FREE TEXT BOX
Evaluated for rib pain x-ray without fracture or pneumothorax pain control discussed follow-up with PMD as needed

## 2023-11-08 NOTE — ED PROVIDER NOTE - NS ED ATTENDING STATEMENT MOD
I have seen and examined this patient and fully participated in the care of this patient as the teaching attending.  The service was shared with the [employer].  I reviewed and verified the documentation and independently performed the documented:

## 2023-11-08 NOTE — ED PROVIDER NOTE - PATIENT PORTAL LINK FT
You can access the FollowMyHealth Patient Portal offered by [facility] by registering at the following website: [email or web address]. By joining [employer]’s FollowMyHealth portal, you will also be able to view your health information using other applications (apps) compatible with our system.

## 2023-11-08 NOTE — ED PROVIDER NOTE - ATTENDING CONTRIBUTION TO CARE
Right upper chest pain after he fell from a standing height directly onto an object 3 days ago.  There is worsening pain with breathing.  There is no head injury.  On exam there is no bruising or ecchymosis the lung's are clear to auscultation bilaterally plan is to obtain x-ray

## 2024-03-04 ENCOUNTER — APPOINTMENT (OUTPATIENT)
Dept: ORTHOPEDIC SURGERY | Facility: CLINIC | Age: 59
End: 2024-03-04
Payer: MEDICAID

## 2024-03-04 VITALS — HEIGHT: 72 IN | BODY MASS INDEX: 35.89 KG/M2 | WEIGHT: 265 LBS

## 2024-03-04 DIAGNOSIS — G56.01 CARPAL TUNNEL SYNDROME, RIGHT UPPER LIMB: ICD-10-CM

## 2024-03-04 PROCEDURE — 99202 OFFICE O/P NEW SF 15 MIN: CPT

## 2024-03-04 NOTE — PHYSICAL EXAM
[de-identified] : Patient has positive Tinel's and median nerve compression test bilaterally.  Patient has no erythema ecchymoses or abrasions.  Patient has thenar weakness mildly bilaterally.  No ecchymoses.

## 2024-03-04 NOTE — HISTORY OF PRESENT ILLNESS
[de-identified] : 59-year-old male with numbness and tingling in both hands.  The left is worse than the right.  He comes in today for evaluation.  He is bothered by numbness and tingling at all times.  It gets worse at night.

## 2024-03-04 NOTE — ASSESSMENT
[FreeTextEntry1] : Patient has bilateral carpal tunnel syndrome.  Would like to proceed with left-sided carpal tunnel release surgery.  Risk and benefits of surgery include limited to bleeding infection risk injury and stiffness discussed at length with the patient.  Postoperative course was discussed.  He will see us back the time of surgical invention for local anesthesia.

## 2024-03-26 NOTE — ASU PATIENT PROFILE, ADULT - NS PRO ABUSE SCREEN SUSPICION NEGLECT YN
Cardiology Follow Up    Milla June  1940  [id number]  [address]  [phone]    1  Essential hypertension     2  Pure hypercholesterolemia     3  S/P CABG (coronary artery bypass graft)     4  Nonrheumatic aortic valve stenosis         Interval History: Patient is here for a follow-up visit  Polly Concepcion has had CABG x 4 done November 8, 2010 and has AS  Mild AS was noted at the time of CABG   A lipid profile done July 2021 demonstrated total cholesterol 182 with an HDL of 52 and a calculated LDL of 115  Patient was in to see me recently and thereafter had hospitalization at the [facility] in reference to CHF  Echocardiogram done May 20, 2022 demonstrated an LVEF of 45% with mild global hypokinesis  Mild reduction in RV function was noted  Severe aortic valve stenosis with a mean gradient of 34 mmHg and a calculated aortic valve area of 0 7 centimeters squared was noted  There was mild MR and mild TR  Patient is here today to arrange surgical consultation and cardiac catheterization towards an eye for AVR hopefully by TAVR  Patient is now on diuretic therapy  BUN and creatinine 05/20/2022 was 38 of 1 62  Patient has appointment with the cardiac surgeon in June  Will arrange cardiac catheterization  He was told hold losartan  Will continue to hold this and likely will need to hold diuretic prior to catheterization      Patient Active Problem List   Diagnosis    Dizziness    Hypertensive urgency    Aortic valve stenosis    Hypothyroidism    Elevated bilirubin    Stage 3 chronic kidney disease ([facility] [address] )    Transition of care performed with sharing of clinical summary    Vitamin D deficiency    Arteriosclerotic cardiovascular disease    Gonzalez's esophagus with low grade dysplasia    Hyperlipidemia    Solar lentigo    Tendonitis of wrist, left    Acute diastolic congestive heart failure (HCC)    Acute respiratory failure with hypoxia (HCC)    Lactic acid acidosis    Hematuria    SIRS (systemic inflammatory response syndrome) (HCC)    Elevated troponin     Past Medical History:   Diagnosis Date    Gonzalez's esophagus without dysplasia     Last Assessed 10/26/2017    Cardiac syncope     Resolved 10/26/2017    Coronary artery disease     Disease of thyroid gland     had a thyroidectomy    Elevated serum creatinine     Resolved     GERD (gastroesophageal reflux disease)     Gilbert syndrome     Hiatal hernia     Hx of colonic polyps     Hyperlipidemia     Hypertension     Lyme disease     Last Assesssed 10/07/2016    Osteoarthritis     Panic attacks      Social History     Socioeconomic History    Marital status: /Civil Union     Spouse name: Not on file    Number of children: Not on file    Years of education: Not on file    Highest education level: Not on file   Occupational History    Not on file   Tobacco Use    Smoking status: Former Smoker     Quit date:      Years since quittin 4    Smokeless tobacco: Never Used   Vaping Use    Vaping Use: Never used   Substance and Sexual Activity    Alcohol use: Not Currently    Drug use: No    Sexual activity: Not on file   Other Topics Concern    Not on file   Social History Narrative    Not on file     Social Determinants of Health     Financial Resource Strain: Not on file   Food Insecurity: No Food Insecurity    Worried About Running Out of Food in the Last Year: Never true    Ignacia of Food in the Last Year: Never true   Transportation Needs: No Transportation Needs    Lack of Transportation (Medical): No    Lack of Transportation (Non-Medical):  No   Physical Activity: Not on file   Stress: Not on file   Social Connections: Not on file   Intimate Partner Violence: Not on file   Housing Stability: Low Risk     Unable to Pay for Housing in the Last Year: No    Number of Places Lived in the Last Year: 1    Unstable Housing in the Last Year: No      Family History   Problem Relation Age of Onset    Hypertension Mother     Cancer Mother      Past Surgical History:   Procedure Laterality Date    BACK SURGERY      CHOLECYSTECTOMY      CORONARY ARTERY BYPASS GRAFT      HERNIA REPAIR      INGUINAL HERNIA REPAIR      Last Assessed 10/07/2016    LUMBAR LAMINECTOMY      Last Assessed 10/07/2016    NC ESOPHAGOGASTRODUODENOSCOPY TRANSORAL DIAGNOSTIC N/A 10/25/2017    Procedure: EGD AND COLONOSCOPY;  Surgeon: Lashay Ma MD;  Location: [facility];   Service: Gastroenterology    THYROIDECTOMY      WRIST SURGERY         Current Outpatient Medications:     aspirin 325 mg tablet, Take 325 mg by mouth daily, Disp: , Rfl:     B Complex Vitamins (B-COMPLEX/B-12 PO), Take by mouth, Disp: , Rfl:     cholecalciferol (VITAMIN D3) 1,000 units tablet, Take 1,000 Units by mouth, Disp: , Rfl:     finasteride (PROSCAR) 5 mg tablet, Take 1 tablet (5 mg total) by mouth in the morning , Disp: 30 tablet, Rfl: 2    furosemide (LASIX) 40 mg tablet, Take 1 tablet (40 mg total) by mouth in the morning , Disp: 30 tablet, Rfl: 0    levothyroxine 150 mcg tablet, Take 1 tablet (150 mcg total) by mouth daily, Disp: 90 tablet, Rfl: 1    metoprolol tartrate (LOPRESSOR) 25 mg tablet, Take 1 tablet (25 mg total) by mouth every 12 (twelve) hours, Disp: 180 tablet, Rfl: 3    omeprazole (PriLOSEC) 20 mg delayed release capsule, Take 1 capsule (20 mg total) by mouth daily, Disp: 90 capsule, Rfl: 1    potassium chloride (K-DUR,KLOR-CON) 20 mEq tablet, Take 1 tablet (20 mEq total) by mouth in the morning , Disp: 30 tablet, Rfl: 0    simvastatin (ZOCOR) 40 mg tablet, TAKE 1 TABLET BY MOUTH EVERY DAY, Disp: 90 tablet, Rfl: 3    Specialty Vitamins Products (MAGNESIUM, AMINO ACID CHELATE,) 133 MG tablet, Take 1 tablet by mouth daily, Disp: , Rfl:     tamsulosin (FLOMAX) 0 4 mg, Take 1 capsule (0 4 mg total) by mouth daily with dinner, Disp: 30 capsule, Rfl: 0    vitamin B-12 (VITAMIN B-12) 1,000 mcg tablet, Take 1,000 mcg by mouth daily with lunch, Disp: , Rfl:   Allergies   Allergen Reactions    Fluorescein Hives       Labs:not applicable  Imaging: XR chest 1 view portable    Result Date: 5/19/2022  Narrative: CHEST INDICATION:   sob  COMPARISON:  Chest radiograph and neck CTA from 2/25/2018  EXAM PERFORMED/VIEWS:  XR CHEST PORTABLE FINDINGS: Mild cardiomegaly, CABG  Clips over the superior mediastinum from thyroidectomy  Moderate pulmonary edema with trace effusions  No pneumothorax  Osseous structures appear within normal limits for patient age  Cholecystectomy  Impression: Moderate pulmonary edema with trace effusions  Workstation performed: [id number]     XR chest pa & lateral    Result Date: 5/20/2022  Narrative: CHEST INDICATION:   chf  COMPARISON:  5/19/2022 EXAM PERFORMED/VIEWS:  XR CHEST PA & LATERAL FINDINGS:  The patient status post median sternotomy Cardiomegaly is present  Small effusions are identified  No focal infiltrate is seen  No pneumothorax  Osseous structures appear within normal limits for patient age  Impression: Small effusions  No infiltrate Pulmonary edema has resolved  Workstation performed: [id number]     US kidney and bladder    Result Date: 5/19/2022  Narrative: RENAL ULTRASOUND INDICATION:   hematuria and paini  COMPARISON: Renal ultrasound 11/10/2010  TECHNIQUE:   Ultrasound of the retroperitoneum was performed with a curvilinear transducer utilizing volumetric sweeps and still imaging techniques  FINDINGS: KIDNEYS: Symmetric and normal size  Right kidney:  9 9 x 5 1 x 4 8 cm  Volume 126 7 mL Left kidney:  10 8 x 4 4 x 4 3 cm  Volume 108 3 mL Right kidney Normal echogenicity and contour  Exophytic cyst measuring 1 3 x 0 9 x 0 9 cm is seen along the lower pole the left kidney    Low-level internal echoes are suggested within the cyst   No evidence of intrinsic vascularity  No hydronephrosis  No shadowing calculi  No perinephric fluid collections  Left kidney Normal echogenicity and contour  No mass is identified  No hydronephrosis  No shadowing calculi  No perinephric fluid collections  URETERS: Nonvisualized  BLADDER: Normally distended  No focal thickening or mass lesions  Bilateral ureteral jets detected  Prominent median lobe of the prostate exerts mass effect on the bladder base  Impression: 1  No hydronephrosis  2   Hypoechoic structure measuring 1 3 cm along the lower pole of the left kidney with faint internal echoes, likely representing a small proteinaceous/hemorrhagic cyst   Follow-up ultrasound is recommended in 6 months to confirm stability  3   Prostatomegaly  Workstation performed: [id number]     Echo follow up/limited w/ contrast if indicated    Result Date: 5/20/2022  Narrative: Kaci Valdez  Left Ventricle: Left ventricular cavity size is normal  Wall thickness is mildly increased  The left ventricular ejection fraction is 45%  Systolic function is mildly reduced  There is mild global hypokinesis with asynchronous septal motion    Right Ventricle: Right ventricular cavity size is mildly dilated  Systolic function is mildly reduced    Left Atrium: The atrium is mildly dilated    Aortic Valve: The aortic valve is trileaflet  The leaflets are severely calcified  There is severely reduced mobility  There is severe stenosis  Peak gradient 57 mm Hg and mean 34 mm Hg  DEREK 0 7 cm2    Mitral Valve: There is moderate annular calcification  There is mild regurgitation    Tricuspid Valve: There is mild regurgitation  Review of Systems:  Review of Systems   All other systems reviewed and are negative  Physical Exam:  /72 (BP Location: Left arm, Patient Position: Sitting, Cuff Size: Standard)   Pulse 78   Ht 5' 7" (1 702 m)   Wt 79 4 kg (175 lb)   SpO2 97%   BMI 27 41 kg/m²   Physical Exam  Vitals reviewed     Constitutional:       Appearance: He is well-developed  HENT:      Head: Normocephalic and atraumatic  Eyes:      Conjunctiva/sclera: Conjunctivae normal       Pupils: Pupils are equal, round, and reactive to light  Cardiovascular:      Rate and Rhythm: Normal rate  Heart sounds: Murmur heard  Pulmonary:      Effort: Pulmonary effort is normal       Breath sounds: Normal breath sounds  Musculoskeletal:      Cervical back: Normal range of motion and neck supple  Skin:     General: Skin is warm and dry  Neurological:      Mental Status: He is alert and oriented to person, place, and time  Discussion/Summary:  Will arrange cardiac catheterization  Patient has been told to do only light activity  Encouraged him to call me if he has any issue  I will see him in follow-up  He has an appointment with Dr Adilene Painting who did his bypass surgery  no

## 2024-03-27 ENCOUNTER — APPOINTMENT (OUTPATIENT)
Dept: ORTHOPEDIC SURGERY | Facility: AMBULATORY SURGERY CENTER | Age: 59
End: 2024-03-27

## 2024-03-27 ENCOUNTER — OUTPATIENT (OUTPATIENT)
Dept: OUTPATIENT SERVICES | Facility: HOSPITAL | Age: 59
LOS: 1 days | Discharge: HOME | End: 2024-03-27
Payer: MEDICAID

## 2024-03-27 ENCOUNTER — TRANSCRIPTION ENCOUNTER (OUTPATIENT)
Age: 59
End: 2024-03-27

## 2024-03-27 VITALS
DIASTOLIC BLOOD PRESSURE: 87 MMHG | HEART RATE: 94 BPM | RESPIRATION RATE: 18 BRPM | OXYGEN SATURATION: 96 % | TEMPERATURE: 98 F | SYSTOLIC BLOOD PRESSURE: 163 MMHG

## 2024-03-27 VITALS
SYSTOLIC BLOOD PRESSURE: 136 MMHG | HEIGHT: 72 IN | TEMPERATURE: 98 F | DIASTOLIC BLOOD PRESSURE: 81 MMHG | HEART RATE: 90 BPM | RESPIRATION RATE: 20 BRPM | OXYGEN SATURATION: 94 % | WEIGHT: 250 LBS

## 2024-03-27 DIAGNOSIS — Z98.890 OTHER SPECIFIED POSTPROCEDURAL STATES: Chronic | ICD-10-CM

## 2024-03-27 DIAGNOSIS — G56.02 CARPAL TUNNEL SYNDROME, LEFT UPPER LIMB: ICD-10-CM

## 2024-03-27 PROCEDURE — 64721 CARPAL TUNNEL SURGERY: CPT | Mod: LT

## 2024-03-27 RX ORDER — ALOGLIPTIN 12.5 MG/1
1 TABLET, FILM COATED ORAL
Qty: 0 | Refills: 0 | DISCHARGE

## 2024-03-27 RX ORDER — IBUPROFEN 200 MG
1 TABLET ORAL
Qty: 20 | Refills: 0
Start: 2024-03-27

## 2024-03-27 RX ORDER — LISINOPRIL 2.5 MG/1
1 TABLET ORAL
Qty: 0 | Refills: 0 | DISCHARGE

## 2024-03-27 RX ORDER — METFORMIN HYDROCHLORIDE 850 MG/1
1 TABLET ORAL
Refills: 0 | DISCHARGE

## 2024-03-27 NOTE — ASU DISCHARGE PLAN (ADULT/PEDIATRIC) - ASU DC SPECIAL INSTRUCTIONSFT

## 2024-03-27 NOTE — ASU DISCHARGE PLAN (ADULT/PEDIATRIC) - DISCHARGE PLAN IS COMPLETE AND GIVEN TO PATIENT
[facility]  INTERNAL MEDICINE DISCHARGE SUMMARY NOTE    ADMISSION DATE:  8/27/2018  DISCHARGE DATE:  8/29/2018    DISCHARGING PHYSICIAN:  Anand Jolly MD  ATTENDING PHYSICIAN:  Anand Jolly MD    CODE STATUS:  No Resuscitation with Maximal Medical Support    DISCHARGE DIAGNOSIS:     1.  Acute on chronic hypoxemic respiratory failure  2.  Atrial fibrillation with RVR  3.  Acute on chronic diastolic CHF  4.  Chronic kidney disease stage IV  5.  COPD  6.  GERD     OTHER DIAGNOSES:    Past Medical History:   Diagnosis Date   • Allergy     see list   • Anticoagulated on Coumadin    • Anxiety    • Arthritis    • Cerebral infarction (CMS/HCC)    • Chronic kidney disease    • Chronic pain    • Congestive cardiac failure (CMS/HCC)    • COPD (chronic obstructive pulmonary disease) (CMS/HCC)    • Essential (primary) hypertension    • Gastroesophageal reflux disease    • Hard of hearing     bilat, has hearing aid   • Lower back pain    • Microhematuria 8/18/2017   • Pneumonia 4/2016   • Shingles    • TIA (transient ischemic attack)        DISCHARGE DISPOSITION:    Home    CONDITION AT DISCHARGE:    stable     DISCHARGE INSTRUCTIONS:    DISCHARGE MEDICATIONS:  See Discharge Medication Reconciliation List  FOLLOW-UP:   Nic Soriano MD within one week of discharge.  Schedule appointment with cardiology.    DIET:  cardiac diet  ACTIVITY:  activity as tolerated  WOUND CARE:  none needed  SPECIAL INSTRUCTIONS:    Seek medical attention if new or worsening symptoms develop.  Recheck INR within a week with coumadin dose adjustment as needed.      READMISSION RISK FACTORS:    Problem Diagnosis and Problem Medications    REASON FOR ADMISSION:    Francy Quiroz is a 89 year old female who was admitted on 8/27/2018 with shortness of breath.    HOSPITAL COURSE:    1.  Acute on chronic hypoxemic respiratory failure - Likely due to acute on chronic CHF exacerbation.  We weaned oxygen gradually and she has returned  to her baseline which is 2 L/m.   She was considered stable for discharge on 8/29/18.  Recommend follow-up with her PCP within the week.  2.  Atrial fibrillation with RVR -  Rate improved with a dose of IV Cardizem in the ER, and then was controlled on oral metoprolol.  Patient was monitored on telemetry.  We continued coumadin management per pharmacy while hospitalized, recommend recheck INR within a week of discharge with dose adjustment if needed, INR was stable 2.2 the day of discharge.    3.  Acute on chronic diastolic and acute systolic CHF - Echocardiogram showed EF slightly decreased from previous with EF 45%, previously 55%, with moderate to severe mitral regurgitation and severe tricuspid regurgitation.  Patient responded well to diuresis with IV lasix, and returned to her baseline oxygen requirements.  Patient declined inpatient cardiology evaluation, but will schedule an appointment with a cardiologist at [facility] as an outpatient.  We increased her oral Lasix dose slightly to 40 mg in the a.m., 20 mg in the afternoon.  Her creatinine increased slightly with the IV Lasix, recommend recheck metabolic panel within the week with further diuretic dose adjustment if indicated.  She should also follow-up with Dr. Ayers as scheduled for monitoring of her renal function.  4.  Chronic kidney disease stage IV - about baseline creatinine level, monitored renal function and fluid status, recommend recheck BMP within the week.   5.  COPD - continued usual nebs/inhaler, oxygen as needed to maintain saturations, currently at baseline  6.  GERD - resumed PPI at discharge, received pepcid while hospitalized    OBJECTIVE:    VITALS:    Visit Vitals  /83 (BP Location: [facility], Patient Position: Semi-Gold's)   Pulse 86   Temp 97.6 °F (36.4 °C) (Oral)   Resp 18   Ht 5' 5\" (1.651 m)   Wt 65.4 kg   SpO2 96%   BMI 24.00 kg/m²          PHYSICAL EXAM:    Patient feels ready for discharge today.  She denies any chest pain.   She feels her breathing is back to baseline.  No nausea or vomiting.  Lower extremity edema is also back to baseline.  General - well-nourished, alert, no acute distress  CV - irregular rate and rhythm, no murmurs detected  Pulmonary - clear to auscultation bilaterally, no respiratory distress   Abdomen - soft, non-tender, normal bowel tones  Extremeties - warm with 1+ edema        SIGNIFICANT FINDINGS/COMPLICATIONS:    No complications    CONSULTS:    none    PROCEDURES:    none    SIGNIFICANT DIAGNOSTIC STUDIES:      Recent Labs  Lab 08/29/18  0540  08/27/18  1930   SODIUM 145  < > 144   POTASSIUM 4.1  < > 4.1   CHLORIDE 108*  < > 109*   CO2 26  < > 26   BUN 43*  < > 35*   CREATININE 2.74*  < > 2.30*   GLUCOSE 97  < > 108*   ALBUMIN  --   --  3.2*   AST  --   --  18   BILIRUBIN  --   --  0.7   TSH  --   --  2.289   < > = values in this interval not displayed.    WBC (K/mcL)   Date Value   08/29/2018 4.3     RBC (mil/mcL)   Date Value   08/29/2018 3.39 (L)     HCT (%)   Date Value   08/29/2018 34.6 (L)     HGB (g/dL)   Date Value   08/29/2018 10.5 (L)     PLT   Date Value   08/29/2018 182 K/mcL   03/05/2009 214 K/uL       Xr Chest Ap Or Pa - Portable    Result Date: 8/27/2018  AP PORTABLE UPRIGHT CHEST 1919 HOURS CLINICAL INDICATION: shortness of breath, chest pain, a fib rvr  COMPARISON: 6/10/2018, 6/6/2018, chest CT dated 4/10/2016 FINDINGS: Cardiomediastinal silhouette is stable, enlarged.  Tortuous aorta. Pulmonary vasculature prominent and somewhat indistinct suggesting mild pulmonary vascular congestion.  Right-sided pleural effusion demonstrated, which appears increased in size from the 16th 2018 exam.  Some interval improvement in left-sided pleural effusion.  Mild blunting of the costophrenic sulcus persists nonacute left rib fractures are demonstrated with callus formation.  Present on 6/10/2018 exam is an area of nodular opacity demonstrated left lateral lung base not obvious on prior study which is  nonspecific and could potentially be secondary to atelectasis.     IMPRESSION: 1.  Cardiac enlargement and findings suggestive of mild pulmonary vascular congestion.  Bilateral pleural effusions as above. 2.  Nodular opacity left midlung zone laterally, nonspecific and could potentially be secondary to atelectasis.  Continued radiographic follow-up recommended.          Summary of your Discharge Medications      Take these Medications      Details   ACIDOPHILUS PROBIOTIC Tab   TAKE 1 TABLET BY MOUTH TWICE A DAY  Comment:  please send a 31 day supply with refills so we can process the next cycle fill thank you     albuterol 108 (90 Base) MCG/ACT inhaler   Inhale 2 puffs into the lungs every 4 hours as needed for Shortness of Breath or Wheezing.     albuterol-ipratropium 2.5 mg/0.5 mg 0.5-2.5 (3) MG/3ML nebulizer solution  Commonly known as:  DUONEB   Take 3 mLs by nebulization 4 times daily.     bacitracin 500 UNIT/GM ointment   Apply topically 2 times daily.     betamethasone dipropionate 0.05 % ointment  Commonly known as:  DIPROSONE   Apply topically as directed. Apply topically to affected area as directed on weekends only     ERGOCALCIFEROL PO   Take 1.25 mg by mouth. Take 1 capsule (=1.25mg) every Tuesday through 6/30/18, then decrease to once monthly     escitalopram 10 MG tablet  Commonly known as:  LEXAPRO   TAKE 1 TABLET BY MOUTH DAILY  Comment:  Refill Too Soon     ferrous sulfate 325 (65 FE) MG tablet   TAKE 1 TABLET BY MOUTH TWICE A DAY  Comment:  We are in need of refills for patient's monthly cycle delivery. Please send refills ASAP. Please and thank you!     fluocinonide 0.05 % ointment  Commonly known as:  LIDEX   Apply topically 2 times daily. To external vaginal area of irritation     furosemide 20 MG tablet  Commonly known as:  LASIX   40 mg in the morning and 20 mg in the afternoon     hydrOXYzine 25 MG tablet  Commonly known as:  ATARAX   TAKE 1 TABLET BY MOUTH EVERY NIGHT AT BEDTIME  Comment:   Refill Too Soon     lidocaine 5 % ointment  Commonly known as:  XYLOCAINE   To tender rib area tid prn     * acetaminophen 325 MG tablet  Commonly known as:  TYLENOL   Take 650 mg by mouth every 4 hours as needed for Pain.     * MAPAP 325 MG tablet   Generic drug:  acetaminophen  TAKE 2 TABLETS BY MOUTH FOUR TIMES A DAY AS NEEDED FOR PAIN/FEVER *MAX DOSE OF 4000MG OF APAP PER 24 HOURS FROM ALL SOURCES*  Comment:  Maximum Refills Reached     * MAPAP 500 MG tablet   Generic drug:  acetaminophen  TAKE 1 TABLET BY MOUTH TWICE A DAY  Comment:  Looking for refills for pt's next cycle.  Please send #[phone] with multiple refills asap if appropriate. Thanks! Keira Alexander     Menthol (Topical Analgesic) 3.5 % Gel  Commonly known as:  PERFORM PAIN RELIEVING   Apply topically to right hand area of pain twice daily     metoPROLOL succinate 25 MG 24 hr tablet  Commonly known as:  TOPROL-XL   Take 25 mg by mouth daily.     mirtazapine 7.5 MG tablet  Commonly known as:  REMERON   TAKE 1 TABLET BY MOUTH EVERY NIGHT AT BEDTIME  Comment:  please send a 31 day supply with refills so we can process the next cycle fill thank you     ondansetron 4 MG disintegrating tablet  Commonly known as:  ZOFRAN ODT   Take 4 mg by mouth every 8 hours as needed for Nausea.     pantoprazole 40 MG tablet  Commonly known as:  PROTONIX   TAKE 1 TABLET BY MOUTH DAILY  Comment:  please send a 31 day supply with refills so we can process the next cycle fill thank you     polyethylene glycol packet  Commonly known as:  GLYCOLAX, MIRALAX   Take 17 g by mouth daily as needed (Constipation).     potassium chloride 10 MEQ ER tablet  Commonly known as:  KLOR-CON, K-TAB   TAKE 1 TABLET BY MOUTH TWICE A DAY  Comment:  2nd request! Refill is needed ASAP for next cycle fill medicaiton delivery to facilty. Thnak you!     ROBITUSSIN ALLERGY/COUGH PO   Take by mouth as needed. Use as directed     rOPINIRole 0.25 MG tablet  Commonly known as:  REQUIP   TAKE 1 TABLET BY MOUTH  EVERY NIGHT AT BEDTIME     STOOL SOFTENER 100 MG capsule   Generic drug:  docusate sodium  TAKE 1 CAPSULE BY MOUTH EVERY NIGHT AT BEDTIME     SYMBICORT 80-4.5 MCG/ACT inhaler   Generic drug:  budesonide/formoterol  INHALE 2 PUFFS BY MOUTH TWICE A DAY  Comment:  Maximum Refills Reached     tiZANidine 4 MG tablet  Commonly known as:  ZANAFLEX   TAKE 1 TABLET BY MOUTH EVERY 8 HOURS AS NEEDED FOR MUSCLE SPASMS  Comment:  fill/send     triamcinolone 0.1 % ointment  Commonly known as:  ARISTOCORT   Apply twice daily for 1 week then daily for 1 week then weekends only     * warfarin 4 MG tablet  Commonly known as:  COUMADIN   Take 1 tablet (4 mg) by mouth on Monday, Wednesday, Friday and Saturday.     * warfarin 3 MG tablet  Commonly known as:  COUMADIN   Take 3 mg by mouth on Sun, Tues, and Thurs        * This list has 5 medication(s) that are the same as other medications prescribed for you. Read the directions carefully, and ask your doctor or other care provider to review them with you.                  STUDIES PENDING AT TIME OF DISCHARGE:  none    STUDIES THAT NEED TO BE ORDERED AT FOLLOW UP:  Recheck CBC, metabolic panel  Resume routine INR monitoring and Coumadin management    CASE DISCUSSED WITH:  Patient    TIME TAKEN FOR DISCHARGE:  >30 minutes    Discharge instructions, medications and followup appointment were discussed with the patient and after visit summary was given.    : Yes

## 2024-03-27 NOTE — ASU DISCHARGE PLAN (ADULT/PEDIATRIC) - NS MD DC FALL RISK RISK
For information on Fall & Injury Prevention, visit: [email or web address] OR  [email or web address] OR  https://www.cdc.gov/steadi/patient.html

## 2024-03-27 NOTE — ASU PREOP CHECKLIST - SURGICAL CONSENT
Patient is already scheduled for 6/5/23 at 11:30 with Dr. Capps. States this appointment date will be fine due to this ongoing for over 2 years. Advised for patient to reach out if symptoms get worse.   done

## 2024-03-29 DIAGNOSIS — E11.9 TYPE 2 DIABETES MELLITUS WITHOUT COMPLICATIONS: ICD-10-CM

## 2024-03-29 DIAGNOSIS — G56.02 CARPAL TUNNEL SYNDROME, LEFT UPPER LIMB: ICD-10-CM

## 2024-03-29 DIAGNOSIS — I10 ESSENTIAL (PRIMARY) HYPERTENSION: ICD-10-CM

## 2024-04-04 ENCOUNTER — APPOINTMENT (OUTPATIENT)
Dept: ORTHOPEDIC SURGERY | Facility: CLINIC | Age: 59
End: 2024-04-04
Payer: MEDICAID

## 2024-04-04 VITALS — HEIGHT: 72 IN | BODY MASS INDEX: 35.89 KG/M2 | WEIGHT: 265 LBS

## 2024-04-04 PROCEDURE — 99024 POSTOP FOLLOW-UP VISIT: CPT

## 2024-04-04 NOTE — HISTORY OF PRESENT ILLNESS
[de-identified] : Patient is a 59 year M here for his first PO appt. He is status post a left OCTR done by Dr. Barth. He is doing well.

## 2024-04-04 NOTE — DISCUSSION/SUMMARY
[de-identified] : Sutures were removed.  I recommend Epson salt and warm water soaks with flexion and extension exercises and scar massage.  Patient understands numbness or tingling can persist for up to 6 months. Any residual numbness and tingling after 6 months is permanent.  He will contact the office with any problems.

## 2024-04-04 NOTE — PHYSICAL EXAM
[de-identified] : Physical exam of his left wrist: Resolving swelling and ecchymosis. The wound is clean and dry. No signs of drainage, pus, or infection.  Good range of motion of the fingers. Sensory and motor are intact.

## 2024-04-08 ENCOUNTER — APPOINTMENT (OUTPATIENT)
Dept: ORTHOPEDIC SURGERY | Facility: CLINIC | Age: 59
End: 2024-04-08
Payer: MEDICAID

## 2024-04-08 VITALS — HEIGHT: 72 IN | WEIGHT: 265 LBS | BODY MASS INDEX: 35.89 KG/M2

## 2024-04-08 DIAGNOSIS — G56.02 CARPAL TUNNEL SYNDROME, LEFT UPPER LIMB: ICD-10-CM

## 2024-04-08 PROCEDURE — 99024 POSTOP FOLLOW-UP VISIT: CPT

## 2024-04-08 NOTE — DISCUSSION/SUMMARY
[de-identified] : I explained to the patient that I do not feel there is an active infection.  I placed her strips over the wound to ensure closure.  He denies any pain at all.  He will contact the office if he notices any increased pain, swelling, redness,, or pus draining from the wound.  Otherwise he will follow-up as needed.  All questions were answered.

## 2024-04-08 NOTE — HISTORY OF PRESENT ILLNESS
[de-identified] : Patient is a 59-year-old male here for wound check.  He noticed his wound opened up a little bit since  his appointment 4 days ago. He noticed some drainage.  He denies any pain.

## 2024-04-17 ENCOUNTER — OUTPATIENT (OUTPATIENT)
Dept: OUTPATIENT SERVICES | Facility: HOSPITAL | Age: 59
LOS: 1 days | End: 2024-04-17
Payer: MEDICAID

## 2024-04-17 ENCOUNTER — APPOINTMENT (OUTPATIENT)
Dept: OPHTHALMOLOGY | Facility: CLINIC | Age: 59
End: 2024-04-17
Payer: MEDICAID

## 2024-04-17 DIAGNOSIS — L71.9 ROSACEA, UNSPECIFIED: ICD-10-CM

## 2024-04-17 DIAGNOSIS — H50.53 VERTICAL HETEROPHORIA: ICD-10-CM

## 2024-04-17 DIAGNOSIS — E11.9 TYPE 2 DIABETES MELLITUS WITHOUT COMPLICATIONS: ICD-10-CM

## 2024-04-17 DIAGNOSIS — H53.8 OTHER VISUAL DISTURBANCES: ICD-10-CM

## 2024-04-17 DIAGNOSIS — Z98.890 OTHER SPECIFIED POSTPROCEDURAL STATES: Chronic | ICD-10-CM

## 2024-04-17 DIAGNOSIS — G43.B0 OPHTHALMOPLEGIC MIGRAINE, NOT INTRACTABLE: ICD-10-CM

## 2024-04-17 PROCEDURE — 92134 CPTRZ OPH DX IMG PST SGM RTA: CPT

## 2024-04-17 PROCEDURE — 92014 COMPRE OPH EXAM EST PT 1/>: CPT

## 2024-04-17 PROCEDURE — 92012 INTRM OPH EXAM EST PATIENT: CPT

## 2024-10-03 ENCOUNTER — APPOINTMENT (OUTPATIENT)
Dept: CARDIOTHORACIC SURGERY | Facility: CLINIC | Age: 59
End: 2024-10-03
Payer: MEDICAID

## 2024-10-03 ENCOUNTER — APPOINTMENT (OUTPATIENT)
Dept: CARDIOLOGY | Facility: CLINIC | Age: 59
End: 2024-10-03

## 2024-10-03 VITALS
BODY MASS INDEX: 35.89 KG/M2 | DIASTOLIC BLOOD PRESSURE: 88 MMHG | TEMPERATURE: 98 F | WEIGHT: 265 LBS | HEART RATE: 96 BPM | HEIGHT: 72 IN | RESPIRATION RATE: 12 BRPM | OXYGEN SATURATION: 94 % | SYSTOLIC BLOOD PRESSURE: 138 MMHG

## 2024-10-03 VITALS
RESPIRATION RATE: 12 BRPM | OXYGEN SATURATION: 94 % | BODY MASS INDEX: 35.89 KG/M2 | HEIGHT: 72 IN | WEIGHT: 265 LBS | SYSTOLIC BLOOD PRESSURE: 138 MMHG | DIASTOLIC BLOOD PRESSURE: 88 MMHG | TEMPERATURE: 98 F | HEART RATE: 96 BPM

## 2024-10-03 DIAGNOSIS — I10 ESSENTIAL (PRIMARY) HYPERTENSION: ICD-10-CM

## 2024-10-03 PROCEDURE — 99204 OFFICE O/P NEW MOD 45 MIN: CPT

## 2024-10-03 PROCEDURE — 99203 OFFICE O/P NEW LOW 30 MIN: CPT

## 2024-10-03 RX ORDER — METFORMIN ER 750 MG 750 MG/1
750 TABLET ORAL
Refills: 0 | Status: ACTIVE | COMMUNITY

## 2024-10-03 NOTE — HISTORY OF PRESENT ILLNESS
[FreeTextEntry1] : Mr. PEDRITO DIAZ 59 year M arrives today for evaluation of their Aortic Stenosis. Patient PMH include DM, HTN, DLD. Symptoms include ADKINS. NYHA class II. Patient lives home (alone/with aid).  The STS risk score was calculated and discussed with the patient. All questions and concerns were addressed with the patient.  They're healthcare team includes the following PMD: Stephanie Cardio: Eduardo    The patient is being worked up for TAVR. They were told that the patient will have an M-COT device placed for 30 days for monitoring of any arrhythmias and for the TAVR study. The patient is agreeable.      Past Diagnosis include Dyslipidemia and Hypertension

## 2024-10-03 NOTE — PHYSICAL EXAM
[General Appearance - Alert] : alert [General Appearance - In No Acute Distress] : in no acute distress [Sclera] : the sclera and conjunctiva were normal [PERRL With Normal Accommodation] : pupils were equal in size, round, and reactive to light [Extraocular Movements] : extraocular movements were intact [Outer Ear] : the ears and nose were normal in appearance [Oropharynx] : the oropharynx was normal [Neck Appearance] : the appearance of the neck was normal [Neck Cervical Mass (___cm)] : no neck mass was observed [Jugular Venous Distention Increased] : there was no jugular-venous distention [Thyroid Diffuse Enlargement] : the thyroid was not enlarged [Thyroid Nodule] : there were no palpable thyroid nodules [Auscultation Breath Sounds / Voice Sounds] : lungs were clear to auscultation bilaterally [IV] : a grade 4 [Bowel Sounds] : normal bowel sounds [Abdomen Soft] : soft [Abdomen Tenderness] : non-tender [Abdomen Mass (___ Cm)] : no abdominal mass palpated [Skin Color & Pigmentation] : normal skin color and pigmentation [Skin Turgor] : normal skin turgor [] : no rash [Deep Tendon Reflexes (DTR)] : deep tendon reflexes were 2+ and symmetric [Sensation] : the sensory exam was normal to light touch and pinprick [No Focal Deficits] : no focal deficits [Oriented To Time, Place, And Person] : oriented to person, place, and time [Impaired Insight] : insight and judgment were intact [Affect] : the affect was normal

## 2024-10-03 NOTE — ASSESSMENT
[FreeTextEntry1] : Plan:  #Aortic Stenosis Moderate to Severe Needs echo, CTA PFT< Carotids RTO after testing to determine severity and plan of action Patient verbalized understanding.

## 2024-10-03 NOTE — PHYSICAL EXAM
[Well Developed] : well developed [Well Nourished] : well nourished [No Acute Distress] : no acute distress [Normal Conjunctiva] : normal conjunctiva [Normal Venous Pressure] : normal venous pressure [No Carotid Bruit] : no carotid bruit [Normal S1, S2] : normal S1, S2 [Clear Lung Fields] : clear lung fields [Good Air Entry] : good air entry [No Respiratory Distress] : no respiratory distress  [Soft] : abdomen soft [Non Tender] : non-tender [No Masses/organomegaly] : no masses/organomegaly [Normal Bowel Sounds] : normal bowel sounds [Normal Gait] : normal gait [No Edema] : no edema [No Cyanosis] : no cyanosis [No Clubbing] : no clubbing [No Varicosities] : no varicosities [No Rash] : no rash [No Skin Lesions] : no skin lesions [Moves all extremities] : moves all extremities [No Focal Deficits] : no focal deficits [Normal Speech] : normal speech [Alert and Oriented] : alert and oriented [Normal memory] : normal memory [de-identified] : SAMSON

## 2024-10-04 DIAGNOSIS — I35.0 NONRHEUMATIC AORTIC (VALVE) STENOSIS: ICD-10-CM

## 2024-10-04 LAB
ALBUMIN SERPL ELPH-MCNC: 4.7 G/DL
ALP BLD-CCNC: 50 U/L
ALT SERPL-CCNC: 57 U/L
ANION GAP SERPL CALC-SCNC: 14 MMOL/L
AST SERPL-CCNC: 29 U/L
BILIRUB SERPL-MCNC: 0.6 MG/DL
BUN SERPL-MCNC: 15 MG/DL
CALCIUM SERPL-MCNC: 10.4 MG/DL
CHLORIDE SERPL-SCNC: 100 MMOL/L
CO2 SERPL-SCNC: 23 MMOL/L
CREAT SERPL-MCNC: 1 MG/DL
EGFR: 86 ML/MIN/1.73M2
GLUCOSE SERPL-MCNC: 222 MG/DL
HCT VFR BLD CALC: 47.6 %
HGB BLD-MCNC: 17.1 G/DL
MCHC RBC-ENTMCNC: 31.4 PG
MCHC RBC-ENTMCNC: 35.9 G/DL
MCV RBC AUTO: 87.3 FL
PLATELET # BLD AUTO: 215 K/UL
PMV BLD AUTO: 0 /100 WBCS
PMV BLD: 10 FL
POTASSIUM SERPL-SCNC: 4.5 MMOL/L
PROT SERPL-MCNC: 7.3 G/DL
RBC # BLD: 5.45 M/UL
RBC # FLD: 12.1 %
SODIUM SERPL-SCNC: 137 MMOL/L
WBC # FLD AUTO: 7.56 K/UL

## 2024-10-04 NOTE — ASSESSMENT
[FreeTextEntry1] : Plan:  #Aortic Stenosis Moderate to Severe Needs echo, CTA PFT< Carotids RTO after testing to determine severity and plan of action Patient verbalized understanding  Patient seen and examined History and physical as per above Briefly, 59 year-old man with AS Evaluating for TAVR vs SAVR Additional testing and then RTC  I, Dr. Montemayor, saw, examined, and reviewed the diagnostic images with the patient and agree with my nurse practitioners clinic note, physical exam findings, and treatment plan.  Ray Montemayor MD

## 2024-10-04 NOTE — HISTORY OF PRESENT ILLNESS
[Dyslipidemia] : Dyslipidemia [Hypertension] : Hypertension [FreeTextEntry1] : Mr. PEDRITO DIAZ 59 year M arrives  today for evaluation of their Aortic Stenosis. Patient PMH include DM, HTN, DLD. Symptoms include ADKINS. NYHA class II. Patient lives home (alone/with aid).  The STS risk score was calculated and discussed with the patient. All questions and concerns were addressed with the patient.   They're healthcare team includes the following PMD: Stephanie Cardio: Eduardo    The patient is being worked up for TAVR. They were told that the patient will have an M-COT device placed for 30 days for monitoring of any arrhythmias and for the TAVR study. The patient is agreeable.

## 2024-10-25 ENCOUNTER — OUTPATIENT (OUTPATIENT)
Dept: OUTPATIENT SERVICES | Facility: HOSPITAL | Age: 59
LOS: 1 days | End: 2024-10-25
Payer: MEDICAID

## 2024-10-25 ENCOUNTER — RESULT REVIEW (OUTPATIENT)
Age: 59
End: 2024-10-25

## 2024-10-25 DIAGNOSIS — Z00.8 ENCOUNTER FOR OTHER GENERAL EXAMINATION: ICD-10-CM

## 2024-10-25 DIAGNOSIS — I35.0 NONRHEUMATIC AORTIC (VALVE) STENOSIS: ICD-10-CM

## 2024-10-25 DIAGNOSIS — Z98.890 OTHER SPECIFIED POSTPROCEDURAL STATES: Chronic | ICD-10-CM

## 2024-10-25 PROCEDURE — 74174 CTA ABD&PLVS W/CONTRAST: CPT

## 2024-10-25 PROCEDURE — 93880 EXTRACRANIAL BILAT STUDY: CPT

## 2024-10-25 PROCEDURE — 75574 CT ANGIO HRT W/3D IMAGE: CPT | Mod: 26

## 2024-10-25 PROCEDURE — 74174 CTA ABD&PLVS W/CONTRAST: CPT | Mod: 26

## 2024-10-25 PROCEDURE — 75574 CT ANGIO HRT W/3D IMAGE: CPT

## 2024-10-25 PROCEDURE — 93880 EXTRACRANIAL BILAT STUDY: CPT | Mod: 26

## 2024-10-26 DIAGNOSIS — I35.0 NONRHEUMATIC AORTIC (VALVE) STENOSIS: ICD-10-CM

## 2024-10-31 ENCOUNTER — APPOINTMENT (OUTPATIENT)
Dept: CARDIOTHORACIC SURGERY | Facility: CLINIC | Age: 59
End: 2024-10-31
Payer: MEDICAID

## 2024-10-31 ENCOUNTER — APPOINTMENT (OUTPATIENT)
Dept: CARDIOLOGY | Facility: CLINIC | Age: 59
End: 2024-10-31
Payer: MEDICAID

## 2024-10-31 VITALS
HEIGHT: 72 IN | TEMPERATURE: 98 F | SYSTOLIC BLOOD PRESSURE: 134 MMHG | OXYGEN SATURATION: 93 % | RESPIRATION RATE: 14 BRPM | BODY MASS INDEX: 35.89 KG/M2 | WEIGHT: 265 LBS | DIASTOLIC BLOOD PRESSURE: 85 MMHG | HEART RATE: 94 BPM

## 2024-10-31 DIAGNOSIS — I10 ESSENTIAL (PRIMARY) HYPERTENSION: ICD-10-CM

## 2024-10-31 DIAGNOSIS — I35.0 NONRHEUMATIC AORTIC (VALVE) STENOSIS: ICD-10-CM

## 2024-10-31 PROCEDURE — 99214 OFFICE O/P EST MOD 30 MIN: CPT

## 2024-10-31 PROCEDURE — 93306 TTE W/DOPPLER COMPLETE: CPT

## 2024-11-19 DIAGNOSIS — I35.0 NONRHEUMATIC AORTIC (VALVE) STENOSIS: ICD-10-CM

## 2024-11-20 ENCOUNTER — NON-APPOINTMENT (OUTPATIENT)
Age: 59
End: 2024-11-20

## 2024-12-02 ENCOUNTER — NON-APPOINTMENT (OUTPATIENT)
Age: 59
End: 2024-12-02

## 2024-12-11 ENCOUNTER — OUTPATIENT (OUTPATIENT)
Dept: OUTPATIENT SERVICES | Facility: HOSPITAL | Age: 59
LOS: 1 days | End: 2024-12-11
Payer: MEDICAID

## 2024-12-11 VITALS
HEART RATE: 93 BPM | DIASTOLIC BLOOD PRESSURE: 91 MMHG | SYSTOLIC BLOOD PRESSURE: 144 MMHG | OXYGEN SATURATION: 95 % | RESPIRATION RATE: 16 BRPM | TEMPERATURE: 98 F

## 2024-12-11 DIAGNOSIS — Z01.818 ENCOUNTER FOR OTHER PREPROCEDURAL EXAMINATION: ICD-10-CM

## 2024-12-11 DIAGNOSIS — I25.10 ATHEROSCLEROTIC HEART DISEASE OF NATIVE CORONARY ARTERY WITHOUT ANGINA PECTORIS: ICD-10-CM

## 2024-12-11 DIAGNOSIS — Z98.890 OTHER SPECIFIED POSTPROCEDURAL STATES: Chronic | ICD-10-CM

## 2024-12-11 LAB
A1C WITH ESTIMATED AVERAGE GLUCOSE RESULT: 9.6 % — HIGH (ref 4–5.6)
ALBUMIN SERPL ELPH-MCNC: 4.2 G/DL — SIGNIFICANT CHANGE UP (ref 3.5–5.2)
ALP SERPL-CCNC: 58 U/L — SIGNIFICANT CHANGE UP (ref 30–115)
ALT FLD-CCNC: 37 U/L — SIGNIFICANT CHANGE UP (ref 0–41)
ANION GAP SERPL CALC-SCNC: 16 MMOL/L — HIGH (ref 7–14)
APTT BLD: 31.2 SEC — SIGNIFICANT CHANGE UP (ref 27–39.2)
AST SERPL-CCNC: 21 U/L — SIGNIFICANT CHANGE UP (ref 0–41)
BILIRUB SERPL-MCNC: 0.6 MG/DL — SIGNIFICANT CHANGE UP (ref 0.2–1.2)
BUN SERPL-MCNC: 15 MG/DL — SIGNIFICANT CHANGE UP (ref 10–20)
CALCIUM SERPL-MCNC: 9.6 MG/DL — SIGNIFICANT CHANGE UP (ref 8.4–10.5)
CHLORIDE SERPL-SCNC: 95 MMOL/L — LOW (ref 98–110)
CO2 SERPL-SCNC: 21 MMOL/L — SIGNIFICANT CHANGE UP (ref 17–32)
CREAT SERPL-MCNC: 0.9 MG/DL — SIGNIFICANT CHANGE UP (ref 0.7–1.5)
EGFR: 97 ML/MIN/1.73M2 — SIGNIFICANT CHANGE UP
ESTIMATED AVERAGE GLUCOSE: 229 MG/DL — HIGH (ref 68–114)
GLUCOSE SERPL-MCNC: 405 MG/DL — HIGH (ref 70–99)
HCT VFR BLD CALC: 49.1 % — SIGNIFICANT CHANGE UP (ref 42–52)
HGB BLD-MCNC: 17.2 G/DL — SIGNIFICANT CHANGE UP (ref 14–18)
INR BLD: 0.87 RATIO — SIGNIFICANT CHANGE UP (ref 0.65–1.3)
MCHC RBC-ENTMCNC: 31.1 PG — HIGH (ref 27–31)
MCHC RBC-ENTMCNC: 35 G/DL — SIGNIFICANT CHANGE UP (ref 32–37)
MCV RBC AUTO: 88.8 FL — SIGNIFICANT CHANGE UP (ref 80–94)
NRBC # BLD: 0 /100 WBCS — SIGNIFICANT CHANGE UP (ref 0–0)
PLATELET # BLD AUTO: 212 K/UL — SIGNIFICANT CHANGE UP (ref 130–400)
PMV BLD: 9.8 FL — SIGNIFICANT CHANGE UP (ref 7.4–10.4)
POTASSIUM SERPL-MCNC: 4.3 MMOL/L — SIGNIFICANT CHANGE UP (ref 3.5–5)
POTASSIUM SERPL-SCNC: 4.3 MMOL/L — SIGNIFICANT CHANGE UP (ref 3.5–5)
PROT SERPL-MCNC: 7 G/DL — SIGNIFICANT CHANGE UP (ref 6–8)
PROTHROM AB SERPL-ACNC: 10.2 SEC — SIGNIFICANT CHANGE UP (ref 9.95–12.87)
RBC # BLD: 5.53 M/UL — SIGNIFICANT CHANGE UP (ref 4.7–6.1)
RBC # FLD: 12.1 % — SIGNIFICANT CHANGE UP (ref 11.5–14.5)
SODIUM SERPL-SCNC: 132 MMOL/L — LOW (ref 135–146)
WBC # BLD: 5.84 K/UL — SIGNIFICANT CHANGE UP (ref 4.8–10.8)
WBC # FLD AUTO: 5.84 K/UL — SIGNIFICANT CHANGE UP (ref 4.8–10.8)

## 2024-12-11 PROCEDURE — 93005 ELECTROCARDIOGRAM TRACING: CPT

## 2024-12-11 PROCEDURE — 85027 COMPLETE CBC AUTOMATED: CPT

## 2024-12-11 PROCEDURE — 36415 COLL VENOUS BLD VENIPUNCTURE: CPT

## 2024-12-11 PROCEDURE — 80053 COMPREHEN METABOLIC PANEL: CPT

## 2024-12-11 PROCEDURE — 93010 ELECTROCARDIOGRAM REPORT: CPT

## 2024-12-11 PROCEDURE — 85730 THROMBOPLASTIN TIME PARTIAL: CPT

## 2024-12-11 PROCEDURE — 85610 PROTHROMBIN TIME: CPT

## 2024-12-11 PROCEDURE — 83036 HEMOGLOBIN GLYCOSYLATED A1C: CPT

## 2024-12-11 PROCEDURE — 99214 OFFICE O/P EST MOD 30 MIN: CPT | Mod: 25

## 2024-12-11 NOTE — H&P PST ADULT - CHARACTER OF SYSTOLIC MURMUR
The patient's goals for the shift include to get back to normal    The clinical goals for the shift include patient will remain safe and verbalize pain at tolerable level through out shift    Over the shift, the patient did make progress toward the following goals. Barriers to progression include none. Recommendations to address these barriers include continue with current plan of care.     carotids, chest,abdomen/murmur loudness: III/VI

## 2024-12-11 NOTE — H&P PST ADULT - HISTORY OF PRESENT ILLNESS
59 year old male here for [facility] states has a severely clogged aortic valve , recently when he lays flat he gets a little sob. denies any other sx. states he has just progrssed to severe was moderate for a couple of years.  FOS= 2-3  denies chest pain + sob + fluttering of heart  denies recent uri or uti    Anesthesia Alert  YES--Difficult Airway -IV airway  NO--History of neck surgery or radiation  NO--Limited ROM of neck  NO--History of Malignant hyperthermia  NO--Personal or family history of Pseudocholinesterase deficiency  NO--Prior Anesthesia Complication  NO--Latex Allergy  NO--Loose teeth  NO--History of Rheumatoid Arthritis  NO--ASIA  NO BLEEDING RISK  NO--Other_____    58.2 points  The higher the score (maximum 58.2), the higher the functional status.  9.89 METs    0 points  RCRI Score  3.9 %  Risk of major cardiac event

## 2024-12-11 NOTE — H&P PST ADULT - NSANTHOSAYNRD_GEN_A_CORE
No. ASIA screening performed.  STOP BANG Legend: 0-2 = LOW Risk; 3-4 = INTERMEDIATE Risk; 5-8 = HIGH Risk

## 2024-12-11 NOTE — H&P PST ADULT - BP NONINVASIVE SYSTOLIC (MM HG)
[facility]  Physical Therapy Re-Assessment     Name: Kd Lindo  : 15[birth year]  MRN: [id number]      Date of Service: 2023    Evaluating PT:  Linnette Nicole PT, DPT [id number]    Room #:  5178/0950-F  Diagnosis:  TIA (transient ischemic attack) [G45.9]  Acute cystitis with hematuria [N30.01]  Stroke-like symptoms [R29.90]  PMHx/PSHx:    Past Medical History:   Diagnosis Date    Anxiety and depression     Arthritis     Asthma     Borderline diabetes     CAD (coronary artery disease)     CHF (congestive heart failure) (HCC)     Chronic back pain     COPD (chronic obstructive pulmonary disease) (HCC)     Diverticulosis     GERD (gastroesophageal reflux disease)     Hyperlipidemia     Hypertension     Left rotator cuff tear     Pneumonia     PONV (postoperative nausea and vomiting)     Prolonged emergence from general anesthesia     TIA (transient ischemic attack) 2012     Procedure/Surgery:  None  Precautions:  Falls, bed/chair alarm, O2, cognition  Equipment Needs:  TBD    SUBJECTIVE:    Pt lives with daughter and son-in-law in a 2 story home with 4 stair(s) to enter and 1 rail(s). Bed is on 2nd floor and bath is on 2nd floor. Full flight of stairs with 0 rails to 2nd floor. Pt ambulated with no AD prior to admission. Pt received assistance with functional mobility prior to admission. OBJECTIVE:   Re-Evaluation  Date: 23 Treatment Short Term/ Long Term   Goals   AM-PAC 6 Clicks /13     Was pt agreeable to Eval/treatment? yes     Does pt have pain?  Pt c/o pain \"everywhere\" - reported it was improving but no rating given     Bed Mobility  Rolling: NT  Supine to sit: ModA with HOB elevated  Sit to supine: NT  Scooting: Ryann  Mod Independent   Transfers Sit to stand: ModA  Stand to sit: ModA  Stand pivot: ModA no device  Mod Independent with AAD if needed   Ambulation   A few steps to chair with ModA no device  >400 feet with Mod Independent with AAD if needed   Stair negotiation:

## 2024-12-12 DIAGNOSIS — Z01.818 ENCOUNTER FOR OTHER PREPROCEDURAL EXAMINATION: ICD-10-CM

## 2024-12-12 DIAGNOSIS — I25.10 ATHEROSCLEROTIC HEART DISEASE OF NATIVE CORONARY ARTERY WITHOUT ANGINA PECTORIS: ICD-10-CM

## 2024-12-13 DIAGNOSIS — I10 ESSENTIAL (PRIMARY) HYPERTENSION: ICD-10-CM

## 2024-12-13 DIAGNOSIS — I35.0 NONRHEUMATIC AORTIC (VALVE) STENOSIS: ICD-10-CM

## 2024-12-16 LAB
HCT VFR BLD CALC: 53.7 %
HGB BLD-MCNC: 18.4 G/DL
MCHC RBC-ENTMCNC: 30.8 PG
MCHC RBC-ENTMCNC: 34.3 G/DL
MCV RBC AUTO: 89.9 FL
PLATELET # BLD AUTO: 212 K/UL
PMV BLD AUTO: 0 /100 WBCS
PMV BLD: 9.9 FL
RBC # BLD: 5.97 M/UL
RBC # FLD: 12 %
WBC # FLD AUTO: 6.07 K/UL

## 2024-12-17 LAB
ALBUMIN SERPL ELPH-MCNC: 4.6 G/DL
ALP BLD-CCNC: 59 U/L
ALT SERPL-CCNC: 52 U/L
ANION GAP SERPL CALC-SCNC: 16 MMOL/L
AST SERPL-CCNC: 34 U/L
BILIRUB SERPL-MCNC: 1 MG/DL
BUN SERPL-MCNC: 14 MG/DL
CALCIUM SERPL-MCNC: 10.5 MG/DL
CHLORIDE SERPL-SCNC: 98 MMOL/L
CO2 SERPL-SCNC: 23 MMOL/L
CREAT SERPL-MCNC: 1.1 MG/DL
EGFR: 76 ML/MIN/1.73M2
GLUCOSE SERPL-MCNC: 248 MG/DL
POTASSIUM SERPL-SCNC: 4.7 MMOL/L
PROT SERPL-MCNC: 7.5 G/DL
SODIUM SERPL-SCNC: 137 MMOL/L

## 2024-12-18 ENCOUNTER — TRANSCRIPTION ENCOUNTER (OUTPATIENT)
Age: 59
End: 2024-12-18

## 2024-12-18 ENCOUNTER — OUTPATIENT (OUTPATIENT)
Dept: OUTPATIENT SERVICES | Facility: HOSPITAL | Age: 59
LOS: 1 days | Discharge: HOME | End: 2024-12-18
Payer: MEDICAID

## 2024-12-18 VITALS
TEMPERATURE: 99 F | HEART RATE: 75 BPM | SYSTOLIC BLOOD PRESSURE: 120 MMHG | RESPIRATION RATE: 18 BRPM | DIASTOLIC BLOOD PRESSURE: 60 MMHG

## 2024-12-18 VITALS
RESPIRATION RATE: 16 BRPM | HEIGHT: 72 IN | HEART RATE: 91 BPM | OXYGEN SATURATION: 96 % | WEIGHT: 250 LBS | SYSTOLIC BLOOD PRESSURE: 117 MMHG | DIASTOLIC BLOOD PRESSURE: 78 MMHG | TEMPERATURE: 97 F

## 2024-12-18 DIAGNOSIS — I25.10 ATHEROSCLEROTIC HEART DISEASE OF NATIVE CORONARY ARTERY WITHOUT ANGINA PECTORIS: ICD-10-CM

## 2024-12-18 DIAGNOSIS — Z98.890 OTHER SPECIFIED POSTPROCEDURAL STATES: Chronic | ICD-10-CM

## 2024-12-18 LAB
ANION GAP SERPL CALC-SCNC: 13 MMOL/L — SIGNIFICANT CHANGE UP (ref 7–14)
BUN SERPL-MCNC: 15 MG/DL — SIGNIFICANT CHANGE UP (ref 10–20)
CALCIUM SERPL-MCNC: 9.9 MG/DL — SIGNIFICANT CHANGE UP (ref 8.4–10.5)
CHLORIDE SERPL-SCNC: 100 MMOL/L — SIGNIFICANT CHANGE UP (ref 98–110)
CO2 SERPL-SCNC: 23 MMOL/L — SIGNIFICANT CHANGE UP (ref 17–32)
CREAT SERPL-MCNC: 1 MG/DL — SIGNIFICANT CHANGE UP (ref 0.7–1.5)
EGFR: 86 ML/MIN/1.73M2 — SIGNIFICANT CHANGE UP
GLUCOSE BLDC GLUCOMTR-MCNC: 225 MG/DL — HIGH (ref 70–99)
GLUCOSE SERPL-MCNC: 248 MG/DL — HIGH (ref 70–99)
HCT VFR BLD CALC: 51.5 % — SIGNIFICANT CHANGE UP (ref 42–52)
HGB BLD-MCNC: 18.3 G/DL — HIGH (ref 14–18)
MCHC RBC-ENTMCNC: 31.3 PG — HIGH (ref 27–31)
MCHC RBC-ENTMCNC: 35.5 G/DL — SIGNIFICANT CHANGE UP (ref 32–37)
MCV RBC AUTO: 88 FL — SIGNIFICANT CHANGE UP (ref 80–94)
NRBC # BLD: 0 /100 WBCS — SIGNIFICANT CHANGE UP (ref 0–0)
PLATELET # BLD AUTO: 198 K/UL — SIGNIFICANT CHANGE UP (ref 130–400)
PMV BLD: 9.4 FL — SIGNIFICANT CHANGE UP (ref 7.4–10.4)
POTASSIUM SERPL-MCNC: 5.2 MMOL/L — HIGH (ref 3.5–5)
POTASSIUM SERPL-SCNC: 5.2 MMOL/L — HIGH (ref 3.5–5)
RBC # BLD: 5.85 M/UL — SIGNIFICANT CHANGE UP (ref 4.7–6.1)
RBC # FLD: 12 % — SIGNIFICANT CHANGE UP (ref 11.5–14.5)
SODIUM SERPL-SCNC: 136 MMOL/L — SIGNIFICANT CHANGE UP (ref 135–146)
WBC # BLD: 6.8 K/UL — SIGNIFICANT CHANGE UP (ref 4.8–10.8)
WBC # FLD AUTO: 6.8 K/UL — SIGNIFICANT CHANGE UP (ref 4.8–10.8)

## 2024-12-18 PROCEDURE — 80048 BASIC METABOLIC PNL TOTAL CA: CPT

## 2024-12-18 PROCEDURE — C1887: CPT

## 2024-12-18 PROCEDURE — 82962 GLUCOSE BLOOD TEST: CPT

## 2024-12-18 PROCEDURE — C1769: CPT

## 2024-12-18 PROCEDURE — 85027 COMPLETE CBC AUTOMATED: CPT

## 2024-12-18 PROCEDURE — 93458 L HRT ARTERY/VENTRICLE ANGIO: CPT

## 2024-12-18 PROCEDURE — C1894: CPT

## 2024-12-18 PROCEDURE — 36415 COLL VENOUS BLD VENIPUNCTURE: CPT

## 2024-12-18 RX ORDER — SODIUM CHLORIDE 9 MG/ML
1000 INJECTION, SOLUTION INTRAMUSCULAR; INTRAVENOUS; SUBCUTANEOUS
Refills: 0 | Status: COMPLETED | OUTPATIENT
Start: 2024-12-18 | End: 2024-12-18

## 2024-12-18 RX ORDER — SODIUM CHLORIDE 9 MG/ML
250 INJECTION, SOLUTION INTRAMUSCULAR; INTRAVENOUS; SUBCUTANEOUS ONCE
Refills: 0 | Status: COMPLETED | OUTPATIENT
Start: 2024-12-18 | End: 2024-12-18

## 2024-12-18 RX ADMIN — Medication 81 MILLIGRAM(S): at 11:19

## 2024-12-18 RX ADMIN — SODIUM CHLORIDE 125 MILLILITER(S): 9 INJECTION, SOLUTION INTRAMUSCULAR; INTRAVENOUS; SUBCUTANEOUS at 13:55

## 2024-12-18 RX ADMIN — SODIUM CHLORIDE 250 MILLILITER(S): 9 INJECTION, SOLUTION INTRAMUSCULAR; INTRAVENOUS; SUBCUTANEOUS at 11:20

## 2024-12-18 NOTE — CHART NOTE - NSCHARTNOTEFT_GEN_A_CORE
PROCEDURE:   - Left heart cath     PHYSICIAN: Dr. Clark   FELLOW: Dr. Badillo    Pre-procedure Diagnosis: Severe AS    Consent: Patient  Anesthesia: Sedation | Local     ACCESS & CLOSURE:  6 Fr R Radial Artery -> D-stat    IV Contrast: 40 mL      Intervention:     Implants:     AUC: 7     FINDINGS:     Coronary Dominance: Right    LM: Minor luminal irregularities.     LAD: Mild disease.   D1: Small vessel. Mild disease.     CX: Small distal vessel with overall mild disease.   OM1: Proximal 50% lesion. Medium to large sized bifurcating vessel.    RCA: Mild disease.   RPDA: Mild disease.      ESTIMATED BLOOD LOSS: < 10 mL      CONDITION: Good   SPECIMEN REMOVED: N/A     POST-OP DIAGNOSIS:    - Mild Coronary Artery Disease (< 50% stenosis)     PLAN OF CARE:   [X] D/C Home Today   [X] Medications:   - Continue same meds  [X] LVEDP guided IV Fluids: NS @ 100cc/h x 6 hours  [X] Remove D-stat. Hold manual pressure if signs of hematoma or bleeding over radial access site.

## 2024-12-18 NOTE — ASU PATIENT PROFILE, ADULT - FALL HARM RISK - UNIVERSAL INTERVENTIONS
Cascade Medical Center Bed in lowest position, wheels locked, appropriate side rails in place/Call bell, personal items and telephone in reach/Instruct patient to call for assistance before getting out of bed or chair/Non-slip footwear when patient is out of bed/[place] to call system/Physically safe environment - no spills, clutter or unnecessary equipment/Purposeful Proactive Rounding/Room/bathroom lighting operational, light cord in reach

## 2024-12-18 NOTE — ASU DISCHARGE PLAN (ADULT/PEDIATRIC) - FINANCIAL ASSISTANCE
[facility] provides services at a reduced cost to those who are determined to be eligible through [facility]’s financial assistance program. Information regarding [facility]’s financial assistance program can be found by going to [email or web address] or by calling [phone].

## 2024-12-18 NOTE — ASU DISCHARGE PLAN (ADULT/PEDIATRIC) - CARE PROVIDER_API CALL
Jun Clark  Cardiology  [address] [place] [address], [address] [address]  Phone: [phone]  Fax: [phone]  Follow Up Time:

## 2024-12-18 NOTE — CHART NOTE - NSCHARTNOTEFT_GEN_A_CORE
INTERVENTIONAL CARDIOLOGY:                                               PREOPERATIVE DAY OF PROCEDURE EVALUATION:  I have personally seen and examined the patient.  I agree with the history and physical which I have reviewed and noted any changes below.         59 year old male here for [facility] states has a severely clogged aortic valve , recently when he lays flat he gets a little sob. denies any other sx. states he has just progressed to severe was moderate for a couple of years.  Had recent CT angio that showed severe artery calcifications and Patient presents to cardiology dept for [facility] for workup for valve surgery      < from: CT Angio Heart Structural w/ IV Cont (10.25.24 @ 10:57) >    IMPRESSION:    1. Severely calcified, trileaflet aortic valve. AV Calcium score: 3354.  2. Extensive coronary artery calcification, cannot exclude severe   stenosis.  3. Mild left atrial enlargement.    < end of copied text >        Pre cath note: pre TAVR workup  indication:  [ ] STEMI                [ ] NSTEMI                 [ ] Acute coronary syndrome                   [ ]Unstable Angina   [ ] high risk  [ ] intermediate risk  [ ] low risk                   [ x] Stable Angina     non-invasive testing:    ccta                      Date:    10/25/24                 result: [ ] high risk  [ ] intermediate risk  [ ] low risk    Anti- Anginal medications:                    [ x] not used d/t  bp borderline- 110/78                   [ ] used   ( ) BB     ( ) CCB      ( ) Nitrate   (  ) Ranexa          [ ] not used but strong indication not to use    Ejection Fraction                   [ ] <29            [ ] 30-39%   [ ] 40-49%     [x ]>50%    CHF          [ ] active (within last 14 days on meds   [ ] Chronic (on meds but no exacerbation)  NYHA Functional Class:  (  ) Class I (no limitations)  (  ) Class II (slight limitation)  (  ) Class III (marked limitation)  (  ) Class IV (symptoms at rest)    COPD                   [ ] mild (on chronic bronchodilators)  [ ] moderate (on chronic steroid therapy)      [ ] severe (indication for home O2 or PACO2 >50)    Other risk factors:                     [ ] Previous MI                     [ ] CVA/ stroke                    [ ] carotid stent/ CEA                    [ ] PVD/PAD- (arterial aneurysm, non-palpable pulses, tortuous vessel with inability to insert catheter, infra-renal dissection, renal or subclavian artery stenosis)                    [ ] previous CABG                    [ ] Renal Failure:  on HD  (  ) yes  (  x) no                    [ x] Diabetic  (  ) Type 1  (  ) Type 2                                         (  ) Insulin dependent  (  ) non-insulin dependent                                         (x  ) Metformin  (  ) Januvia  (  ) Glimepiride  (  ) Glipizide  (  ) Glyburide  (  ) Actos                                         (  ) GLP-1 receptor agonists (Ozempic, Wegovy, Zepbound, Mounjaro, Trulicity, Byetta, Victoza)                                         (  ) SGLT2 Inhibitors (Farxiga, Jardiance, Invokana)                                         (  ) Other                Bleeding Risk: 0.8%    Pre-cath Hydration: ( x )  cc IV bolus x 1 over 1 hr followed by:    (  ) NS @ 75cc/hr until procedure (up to 2 hrs) if EF> 50%                                                                                                                             (  ) NS @ 50cc/hr until procedure (up to 2 hrs) if EF< 50%                                        (  ) No precath hydration d/t      RIGHT RADIAL ARTERY EVALUATION:  SON TEST: [] Negative          [x] Positive    EF: 11/3/24  _______________________________________________________________________________________  _______________________________________________________________________________________   CONCLUSIONS:  1.Left ventricular cavity is normal in size. Left ventricular systolic function is normal with an ejection fraction of 61 % by Nelson's method of disks. There are no regional wall motion abnormalities seen.  2.Mild left ventricular hypertrophy.  3.There is mild (grade 1) left ventricular diastolic dysfunction.  4.Normal right ventricular cavity size, with normal wall thickness, and normal right ventricular systolic function.  5.Normal left and right atrial size.  6.Probably bicuspid aortic valve with reduced systolic excursion. There is moderate calcification of the aortic valve leaflets. Severe aortic stenosis. Peak/mean PG 75/47 mmHg, MERCEDEZ 1.1 cm^2, AVAi 0.46   cm^2.  7.Thickened mitral valve leaflets.  8.Mild mitral regurgitation.  9.No pericardial effusion seen.  10.No prior echocardiogram is available for comparison        EKG:   Date: 12/11/24       (Signed electronically by __________)  12-18-24 @ 11:15

## 2024-12-18 NOTE — ASU DISCHARGE PLAN (ADULT/PEDIATRIC) - ASU DC SPECIAL INSTRUCTIONSFT
Discharge Instructions as follows:  - Continue medical regimen as prescribed to prevent chest pain.  - If you are diabetic and taking medication containing Metformin, do not take them for 48 hours after the procedure  - Instructed to call 911 if chest pain, shortness of breath or bleeding from access site.  - No heavy lifting > 10lbs x 1 week.  - No driving x 24 hours.  - No baths, swimming pools x 1 week, may shower  - Low sodium low fat low cholesterol diet  - Follow-up with Cardiologist in 1-2 weeks after discharge  - Soreness or tenderness at the site is possible, it will diminish over time. You may take Tylenol every 4-6 hours as needed. Nothing stronger is needed. NO Motrin/Ibuprofen  - Any questions call cardiac cath lab [phone]

## 2024-12-20 DIAGNOSIS — E78.5 HYPERLIPIDEMIA, UNSPECIFIED: ICD-10-CM

## 2024-12-20 DIAGNOSIS — I25.10 ATHEROSCLEROTIC HEART DISEASE OF NATIVE CORONARY ARTERY WITHOUT ANGINA PECTORIS: ICD-10-CM

## 2024-12-20 DIAGNOSIS — I10 ESSENTIAL (PRIMARY) HYPERTENSION: ICD-10-CM

## 2024-12-24 ENCOUNTER — NON-APPOINTMENT (OUTPATIENT)
Age: 59
End: 2024-12-24

## 2025-01-02 ENCOUNTER — APPOINTMENT (OUTPATIENT)
Dept: CARDIOTHORACIC SURGERY | Facility: CLINIC | Age: 60
End: 2025-01-02

## 2025-01-02 VITALS
HEIGHT: 72 IN | DIASTOLIC BLOOD PRESSURE: 87 MMHG | HEART RATE: 98 BPM | RESPIRATION RATE: 14 BRPM | OXYGEN SATURATION: 92 % | SYSTOLIC BLOOD PRESSURE: 148 MMHG | BODY MASS INDEX: 33.86 KG/M2 | WEIGHT: 250 LBS

## 2025-01-02 PROCEDURE — 99214 OFFICE O/P EST MOD 30 MIN: CPT

## 2025-01-02 RX ORDER — LISINOPRIL 5 MG/1
5 TABLET ORAL
Refills: 0 | Status: ACTIVE | COMMUNITY

## 2025-01-08 ENCOUNTER — OUTPATIENT (OUTPATIENT)
Dept: OUTPATIENT SERVICES | Facility: HOSPITAL | Age: 60
LOS: 1 days | End: 2025-01-08
Payer: MEDICAID

## 2025-01-08 ENCOUNTER — NON-APPOINTMENT (OUTPATIENT)
Age: 60
End: 2025-01-08

## 2025-01-08 ENCOUNTER — RESULT REVIEW (OUTPATIENT)
Age: 60
End: 2025-01-08

## 2025-01-08 ENCOUNTER — APPOINTMENT (OUTPATIENT)
Dept: PULMONOLOGY | Facility: HOSPITAL | Age: 60
End: 2025-01-08

## 2025-01-08 VITALS
OXYGEN SATURATION: 97 % | HEART RATE: 88 BPM | HEIGHT: 72 IN | RESPIRATION RATE: 16 BRPM | DIASTOLIC BLOOD PRESSURE: 91 MMHG | WEIGHT: 250 LBS | SYSTOLIC BLOOD PRESSURE: 152 MMHG | TEMPERATURE: 98 F

## 2025-01-08 DIAGNOSIS — Z01.818 ENCOUNTER FOR OTHER PREPROCEDURAL EXAMINATION: ICD-10-CM

## 2025-01-08 DIAGNOSIS — Z98.890 OTHER SPECIFIED POSTPROCEDURAL STATES: Chronic | ICD-10-CM

## 2025-01-08 DIAGNOSIS — Z93.3 COLOSTOMY STATUS: Chronic | ICD-10-CM

## 2025-01-08 DIAGNOSIS — I35.0 NONRHEUMATIC AORTIC (VALVE) STENOSIS: ICD-10-CM

## 2025-01-08 DIAGNOSIS — I35.1 NONRHEUMATIC AORTIC (VALVE) INSUFFICIENCY: ICD-10-CM

## 2025-01-08 LAB
A1C WITH ESTIMATED AVERAGE GLUCOSE RESULT: 9.9 % — HIGH (ref 4–5.6)
ALBUMIN SERPL ELPH-MCNC: 4.2 G/DL — SIGNIFICANT CHANGE UP (ref 3.5–5.2)
ALP SERPL-CCNC: 49 U/L — SIGNIFICANT CHANGE UP (ref 30–115)
ALT FLD-CCNC: 33 U/L — SIGNIFICANT CHANGE UP (ref 0–41)
ANION GAP SERPL CALC-SCNC: 15 MMOL/L — HIGH (ref 7–14)
APPEARANCE UR: CLEAR — SIGNIFICANT CHANGE UP
APTT BLD: 29.7 SEC — SIGNIFICANT CHANGE UP (ref 27–39.2)
AST SERPL-CCNC: 21 U/L — SIGNIFICANT CHANGE UP (ref 0–41)
BACTERIA # UR AUTO: NEGATIVE /HPF — SIGNIFICANT CHANGE UP
BASOPHILS # BLD AUTO: 0.04 K/UL — SIGNIFICANT CHANGE UP (ref 0–0.2)
BASOPHILS NFR BLD AUTO: 0.6 % — SIGNIFICANT CHANGE UP (ref 0–1)
BILIRUB SERPL-MCNC: 0.7 MG/DL — SIGNIFICANT CHANGE UP (ref 0.2–1.2)
BILIRUB UR-MCNC: NEGATIVE — SIGNIFICANT CHANGE UP
BLD GP AB SCN SERPL QL: SIGNIFICANT CHANGE UP
BUN SERPL-MCNC: 12 MG/DL — SIGNIFICANT CHANGE UP (ref 10–20)
CALCIUM SERPL-MCNC: 9.6 MG/DL — SIGNIFICANT CHANGE UP (ref 8.4–10.5)
CAST: 0 /LPF — SIGNIFICANT CHANGE UP (ref 0–4)
CHLORIDE SERPL-SCNC: 96 MMOL/L — LOW (ref 98–110)
CO2 SERPL-SCNC: 21 MMOL/L — SIGNIFICANT CHANGE UP (ref 17–32)
COLOR SPEC: YELLOW — SIGNIFICANT CHANGE UP
CREAT SERPL-MCNC: 0.8 MG/DL — SIGNIFICANT CHANGE UP (ref 0.7–1.5)
DIFF PNL FLD: NEGATIVE — SIGNIFICANT CHANGE UP
EGFR: 101 ML/MIN/1.73M2 — SIGNIFICANT CHANGE UP
EOSINOPHIL # BLD AUTO: 0.09 K/UL — SIGNIFICANT CHANGE UP (ref 0–0.7)
EOSINOPHIL NFR BLD AUTO: 1.3 % — SIGNIFICANT CHANGE UP (ref 0–8)
ESTIMATED AVERAGE GLUCOSE: 237 MG/DL — HIGH (ref 68–114)
GLUCOSE SERPL-MCNC: 267 MG/DL — HIGH (ref 70–99)
GLUCOSE UR QL: 500 MG/DL
HCT VFR BLD CALC: 45.3 % — SIGNIFICANT CHANGE UP (ref 42–52)
HGB BLD-MCNC: 16.2 G/DL — SIGNIFICANT CHANGE UP (ref 14–18)
IMM GRANULOCYTES NFR BLD AUTO: 0.4 % — HIGH (ref 0.1–0.3)
INR BLD: 0.88 RATIO — SIGNIFICANT CHANGE UP (ref 0.65–1.3)
KETONES UR-MCNC: ABNORMAL MG/DL
LEUKOCYTE ESTERASE UR-ACNC: NEGATIVE — SIGNIFICANT CHANGE UP
LYMPHOCYTES # BLD AUTO: 1.6 K/UL — SIGNIFICANT CHANGE UP (ref 1.2–3.4)
LYMPHOCYTES # BLD AUTO: 23.5 % — SIGNIFICANT CHANGE UP (ref 20.5–51.1)
MCHC RBC-ENTMCNC: 30.7 PG — SIGNIFICANT CHANGE UP (ref 27–31)
MCHC RBC-ENTMCNC: 35.8 G/DL — SIGNIFICANT CHANGE UP (ref 32–37)
MCV RBC AUTO: 85.8 FL — SIGNIFICANT CHANGE UP (ref 80–94)
MONOCYTES # BLD AUTO: 0.64 K/UL — HIGH (ref 0.1–0.6)
MONOCYTES NFR BLD AUTO: 9.4 % — HIGH (ref 1.7–9.3)
MRSA PCR RESULT.: NEGATIVE — SIGNIFICANT CHANGE UP
NEUTROPHILS # BLD AUTO: 4.42 K/UL — SIGNIFICANT CHANGE UP (ref 1.4–6.5)
NEUTROPHILS NFR BLD AUTO: 64.8 % — SIGNIFICANT CHANGE UP (ref 42.2–75.2)
NITRITE UR-MCNC: NEGATIVE — SIGNIFICANT CHANGE UP
NRBC # BLD: 0 /100 WBCS — SIGNIFICANT CHANGE UP (ref 0–0)
PH UR: 6 — SIGNIFICANT CHANGE UP (ref 5–8)
PLATELET # BLD AUTO: 227 K/UL — SIGNIFICANT CHANGE UP (ref 130–400)
PMV BLD: 10 FL — SIGNIFICANT CHANGE UP (ref 7.4–10.4)
POTASSIUM SERPL-MCNC: 4 MMOL/L — SIGNIFICANT CHANGE UP (ref 3.5–5)
POTASSIUM SERPL-SCNC: 4 MMOL/L — SIGNIFICANT CHANGE UP (ref 3.5–5)
PROT SERPL-MCNC: 7.1 G/DL — SIGNIFICANT CHANGE UP (ref 6–8)
PROT UR-MCNC: 100 MG/DL
PROTHROM AB SERPL-ACNC: 10.4 SEC — SIGNIFICANT CHANGE UP (ref 9.95–12.87)
RBC # BLD: 5.28 M/UL — SIGNIFICANT CHANGE UP (ref 4.7–6.1)
RBC # FLD: 12.1 % — SIGNIFICANT CHANGE UP (ref 11.5–14.5)
RBC CASTS # UR COMP ASSIST: 1 /HPF — SIGNIFICANT CHANGE UP (ref 0–4)
SODIUM SERPL-SCNC: 132 MMOL/L — LOW (ref 135–146)
SP GR SPEC: 1.02 — SIGNIFICANT CHANGE UP (ref 1–1.03)
SQUAMOUS # UR AUTO: 0 /HPF — SIGNIFICANT CHANGE UP (ref 0–5)
UROBILINOGEN FLD QL: 0.2 MG/DL — SIGNIFICANT CHANGE UP (ref 0.2–1)
WBC # BLD: 6.82 K/UL — SIGNIFICANT CHANGE UP (ref 4.8–10.8)
WBC # FLD AUTO: 6.82 K/UL — SIGNIFICANT CHANGE UP (ref 4.8–10.8)
WBC UR QL: 0 /HPF — SIGNIFICANT CHANGE UP (ref 0–5)

## 2025-01-08 PROCEDURE — 94727 GAS DIL/WSHOT DETER LNG VOL: CPT | Mod: 26

## 2025-01-08 PROCEDURE — 86850 RBC ANTIBODY SCREEN: CPT

## 2025-01-08 PROCEDURE — 94729 DIFFUSING CAPACITY: CPT

## 2025-01-08 PROCEDURE — 94729 DIFFUSING CAPACITY: CPT | Mod: 26

## 2025-01-08 PROCEDURE — 71046 X-RAY EXAM CHEST 2 VIEWS: CPT | Mod: 26

## 2025-01-08 PROCEDURE — 83036 HEMOGLOBIN GLYCOSYLATED A1C: CPT

## 2025-01-08 PROCEDURE — 86900 BLOOD TYPING SEROLOGIC ABO: CPT

## 2025-01-08 PROCEDURE — 99214 OFFICE O/P EST MOD 30 MIN: CPT | Mod: 25

## 2025-01-08 PROCEDURE — 94070 EVALUATION OF WHEEZING: CPT

## 2025-01-08 PROCEDURE — 87640 STAPH A DNA AMP PROBE: CPT

## 2025-01-08 PROCEDURE — 71046 X-RAY EXAM CHEST 2 VIEWS: CPT

## 2025-01-08 PROCEDURE — 85610 PROTHROMBIN TIME: CPT

## 2025-01-08 PROCEDURE — 94726 PLETHYSMOGRAPHY LUNG VOLUMES: CPT

## 2025-01-08 PROCEDURE — 87641 MR-STAPH DNA AMP PROBE: CPT

## 2025-01-08 PROCEDURE — 85730 THROMBOPLASTIN TIME PARTIAL: CPT

## 2025-01-08 PROCEDURE — 93010 ELECTROCARDIOGRAM REPORT: CPT

## 2025-01-08 PROCEDURE — 80053 COMPREHEN METABOLIC PANEL: CPT

## 2025-01-08 PROCEDURE — 93005 ELECTROCARDIOGRAM TRACING: CPT

## 2025-01-08 PROCEDURE — 86901 BLOOD TYPING SEROLOGIC RH(D): CPT

## 2025-01-08 PROCEDURE — 94060 EVALUATION OF WHEEZING: CPT | Mod: 26

## 2025-01-08 PROCEDURE — 85025 COMPLETE CBC W/AUTO DIFF WBC: CPT

## 2025-01-08 PROCEDURE — 94664 DEMO&/EVAL PT USE INHALER: CPT

## 2025-01-08 PROCEDURE — 36415 COLL VENOUS BLD VENIPUNCTURE: CPT

## 2025-01-08 PROCEDURE — 81001 URINALYSIS AUTO W/SCOPE: CPT

## 2025-01-08 PROCEDURE — 87086 URINE CULTURE/COLONY COUNT: CPT

## 2025-01-08 NOTE — H&P PST ADULT - NSICDXFAMILYHX_GEN_ALL_CORE_FT
FAMILY HISTORY:  Father  Still living? Unknown  Family history of CVA, Age at diagnosis: Age Unknown    Mother  Still living? No  FH: lung cancer, Age at diagnosis: Age Unknown
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
## 2025-01-08 NOTE — H&P PST ADULT - REASON FOR ADMISSION
60 Y/O MALE HERE FOR PRE-ADMISSION SURGICAL TESTING. PATIENT REPORTS  he has a h/o severe AS. Pt had CTA revealing a Tri-Commissural Bicuspid Aortic Valve. Had CATH to assess CAD. PMHx of DM, HTN, HLD, basal cell cancer.  +ADKINS.  NOW FOR SCHEDULED AVR.

## 2025-01-08 NOTE — H&P PST ADULT - MUSCULOSKELETAL
Patient brought in by EMS for N/V, started this morning. A&O x3, c/o abdominal and back pain. 3 episodes of vomiting.
normal/ROM intact/normal gait/strength 5/5 bilateral upper extremities/strength 5/5 bilateral lower extremities

## 2025-01-08 NOTE — H&P PST ADULT - NSICDXPASTMEDICALHX_GEN_ALL_CORE_FT
PAST MEDICAL HISTORY:  DM (diabetes mellitus)     H/O aortic valve stenosis     HTN (hypertension)

## 2025-01-08 NOTE — H&P PST ADULT - HISTORY OF PRESENT ILLNESS
PATIENT DENIES CHEST PAIN, COUGHING, FEVER, DYSURIA.    + SHORTNESS OF BREATH AT NIGHT WHILE LYING FLAT.   +PALPITATIONS AT TIMES.    Anesthesia Alert  + Difficult Airway (CLASS IV)  NO--History of neck surgery or radiation  NO--Limited ROM of neck  NO--History of Malignant hyperthermia  NO--Personal or family history of Pseudocholinesterase deficiency  NO--Prior Anesthesia Complication  NO--Latex Allergy  NO--Loose teeth  NO--History of Rheumatoid Arthritis  NO--ASIA  NO--BLEEDING RISK    RCRI=1  DASI=9.89 METS       PATIENT DENIES CHEST PAIN, COUGHING, FEVER, DYSURIA.    + SHORTNESS OF BREATH AT NIGHT WHILE LYING FLAT.   +PALPITATIONS AT TIMES.    Anesthesia Alert  + Difficult Airway (CLASS IV)  NO--History of neck surgery or radiation  NO--Limited ROM of neck  NO--History of Malignant hyperthermia  NO--Personal or family history of Pseudocholinesterase deficiency  NO--Prior Anesthesia Complication  NO--Latex Allergy  + Loose teeth (BOTTOM LOWER. WAS CLEARED BY DENTAL)  NO--History of Rheumatoid Arthritis  NO--ASIA  NO--BLEEDING RISK    RCRI=1  DASI=9.89 METS

## 2025-01-08 NOTE — H&P PST ADULT - NS SC CAGE ALCOHOL EYE OPENER
[facility]    Facility:   [facility] [[id number]]   Code Status: FULL CODE      CHIEF COMPLAINT/REASON FOR VISIT:  Chief Complaint   Patient presents with     Problem Visit     still with cough       HISTORY:      HPI: María Elena is a 51 y.o. female Who I was asked to see by RN staff. I last saw her on January 6. She had been sick with cough sore throat and ear pain particularly on the left at that time. Her left EAC was likely erythematous but no fluid behind the drums. Throat was erythematous but with no accidents.  Temperature per report of RN staff reached up to as high as 102 but nothing recorded in the EHR. Was coughing and very congested. Did not tolerate house stock of cough suspension. And had been sucking on lozenges.    I gave her a course of Augmentin. She doesn't feel like she is better. Still coughing very deeply. No phlegm production. No more recurrence of high fevers. Here is still slightly painful. But the very significant sore throat. No chills. Appetite is fair. Had been taking ibuprofen as needed.          Past Medical History   Diagnosis Date     Aseptic necrosis of femoral head      LEFT     DJD (degenerative joint disease)      DVT, bilateral lower limbs 10/2014     Edema - swelling of the legs after blood clots 5/22/2015     Essential hypertension with goal blood pressure less than 140/90      Failure to thrive      History of blood clots      Hypokalemia 10/15/2014     Lung mass 10/18/2014     Morbid obesity 4/10/2015     Had formal nutrition consult at [facility].  RD reports that she is not willing to commit to the program outlined.  See scanned document.  12/15/2015 - Marcio Quinonez MD        Obesity - although she was not weighed today, she is clearly obese on inspection. 4/10/2015     LOPEZ (obstructive sleep apnea) - abnormal overnight pulse oximetry 5/22/2015     Osteoarthritis      Peripheral vascular disease      Pleural effusion      Pressure  ulcer of foot      Rheumatoid arthritis 12/30/2014     seronegative     Seropositive rheumatoid arthritis 1/19/2016     Vitamin D deficiency 8/26/2015             Family History   Problem Relation Age of Onset     Multiple myeloma Father      Deep vein thrombosis Father      Cataracts Father      Snoring Father      Other Brother      Blood withdrawn from time to time.  Sounds like hemochromatosis.     Sleep apnea Brother      Breast cancer Mother      Cancer Mother      Uterine CA     Arthritis Mother      Cataracts Mother      No Medical Problems Sister      Rheum arthritis Maternal Grandmother      Breast cancer Maternal Grandmother 50     Heart disease Maternal Grandmother      Rheum arthritis Paternal Aunt      Clotting disorder Neg Hx      Social History     Social History     Marital status: Single     Spouse name: N/A     Number of children: N/A     Years of education: N/A     Social History Main Topics     Smoking status: Former Smoker     Packs/day: 1.00     Years: 30.00     Types: Cigarettes     Smokeless tobacco: Never Used     Alcohol use Yes      Comment: 3-4 times a year she will have 1 or 2.     Drug use: No     Sexual activity: No     Other Topics Concern     Not on file     Social History Narrative    Long term  At [facility] to rehab to [facility] allowing surgery on deteriorated joints  LTC12/2015         Review of Systems  As above  .There were no vitals filed for this visit.    Physical Exam    VS noted no fevers rest of VS stable  Reveals skin been no rash, some frontal sinus tenderness. Left EA but less intense in severity. Iis still mildly erythematous,  No fluid in the middle ear. Right E a C is normal. Oral mucosa is dry erythematous pharyngeal walls no tonsillar exudates no palpable lymph nodes in the cervical area although she complains of tenderness in those areas    Sounds are clear without any wheezes or crackles or rhonchi heart is a normal sinus rhythm    LABS:        ASSESSMENT:       ICD-10-CM    1. Upper respiratory infection J06.9    2. Sinus congestion R09.81        PLAN:    Discussed with RN staff. Will call pharmacy to send in a different flavor of Guaifenesin/Dextromethorphan,   Obtain Rapid strep screen  COntinue Ibuprofen PRN.   Increase oral fluids.       Total 25 minutes of which 55% was spent counseling and coordination of care of the above plan.    Electronically signed by: Uvaldo Brunner MD   no

## 2025-01-09 DIAGNOSIS — I35.1 NONRHEUMATIC AORTIC (VALVE) INSUFFICIENCY: ICD-10-CM

## 2025-01-09 DIAGNOSIS — Z01.818 ENCOUNTER FOR OTHER PREPROCEDURAL EXAMINATION: ICD-10-CM

## 2025-01-09 DIAGNOSIS — I35.0 NONRHEUMATIC AORTIC (VALVE) STENOSIS: ICD-10-CM

## 2025-01-09 LAB
CULTURE RESULTS: SIGNIFICANT CHANGE UP
SPECIMEN SOURCE: SIGNIFICANT CHANGE UP

## 2025-01-09 NOTE — END OF VISIT
I was present with the PA during the history and exam. I discussed the case with Kirsten and agree with the findings and plan as documented in the PA's note.  I performed the key component of the medical decision making in it's entirety.  Will obtain CT angiography.  Will start amitriptyline 10 mg at bedtime.  Further recommendations to follow.    Jewel Marmolejo MD     [] : Resident

## 2025-01-13 VITALS
DIASTOLIC BLOOD PRESSURE: 88 MMHG | HEIGHT: 72.01 IN | RESPIRATION RATE: 20 BRPM | WEIGHT: 250 LBS | TEMPERATURE: 99 F | HEART RATE: 90 BPM | OXYGEN SATURATION: 97 % | SYSTOLIC BLOOD PRESSURE: 145 MMHG

## 2025-01-13 NOTE — PRE-OP CHECKLIST - PATIENT SENT TO
operating room strength with eversion and inversion    Effusion: No apparent effusion. Neurologic and vascular: The skin is warm and well perfused throughout. Sensation intact to light touch    Special Tests: Patient able to perform single-leg stance, single-leg heel raise, single-leg hop, squat and duck walk without pain or difficulty. Radiology:       No new x-rays today. Assessment : 78-year-old female with right low ankle sprain    Impression:  Encounter Diagnosis   Name Primary? Sprain of other ligament of right ankle, initial encounter Yes       Office Procedures:  No orders of the defined types were placed in this encounter. Plan:     The patient has fully healed from her ankle sprain and she has near complete strength compared to the opposite side. She has no pain and no instability. I will allow her to return to play. She can return to practice today for the game tomorrow. I recommend she wear her brace to prevent reinjury. She can follow-up as needed. . Chad Borden is in agreement with this plan. All questions were answered to patient's satisfaction and was encouraged to call with any further questions. Juliette Wu,   Orthopedic Surgery and Sports Medicine  10/2/2023      This dictation was performed with a verbal recognition program [facility]) and it was checked for errors. It is possible that there are still dictated errors within this office note. If so, please bring any errors to my attention for an addendum. All efforts were made to ensure that this office note is accurate. 4

## 2025-01-14 ENCOUNTER — APPOINTMENT (OUTPATIENT)
Dept: CARDIOTHORACIC SURGERY | Facility: HOSPITAL | Age: 60
End: 2025-01-14

## 2025-01-14 ENCOUNTER — RESULT REVIEW (OUTPATIENT)
Age: 60
End: 2025-01-14

## 2025-01-14 ENCOUNTER — INPATIENT (INPATIENT)
Facility: HOSPITAL | Age: 60
LOS: 4 days | Discharge: HOME HEALTH | DRG: 163 | End: 2025-01-19
Attending: THORACIC SURGERY (CARDIOTHORACIC VASCULAR SURGERY) | Admitting: THORACIC SURGERY (CARDIOTHORACIC VASCULAR SURGERY)
Payer: MEDICAID

## 2025-01-14 ENCOUNTER — TRANSCRIPTION ENCOUNTER (OUTPATIENT)
Age: 60
End: 2025-01-14

## 2025-01-14 DIAGNOSIS — Z98.890 OTHER SPECIFIED POSTPROCEDURAL STATES: Chronic | ICD-10-CM

## 2025-01-14 DIAGNOSIS — I35.1 NONRHEUMATIC AORTIC (VALVE) INSUFFICIENCY: ICD-10-CM

## 2025-01-14 DIAGNOSIS — Z93.3 COLOSTOMY STATUS: Chronic | ICD-10-CM

## 2025-01-14 LAB
ABO RH CONFIRMATION: SIGNIFICANT CHANGE UP
ALBUMIN SERPL ELPH-MCNC: 3.1 G/DL — LOW (ref 3.5–5.2)
ALP SERPL-CCNC: 37 U/L — SIGNIFICANT CHANGE UP (ref 30–115)
ALT FLD-CCNC: 21 U/L — SIGNIFICANT CHANGE UP (ref 0–41)
ANION GAP SERPL CALC-SCNC: 11 MMOL/L — SIGNIFICANT CHANGE UP (ref 7–14)
APTT BLD: 26.7 SEC — LOW (ref 27–39.2)
AST SERPL-CCNC: 51 U/L — HIGH (ref 0–41)
BASOPHILS # BLD AUTO: 0.04 K/UL — SIGNIFICANT CHANGE UP (ref 0–0.2)
BASOPHILS NFR BLD AUTO: 0.6 % — SIGNIFICANT CHANGE UP (ref 0–1)
BILIRUB SERPL-MCNC: 0.6 MG/DL — SIGNIFICANT CHANGE UP (ref 0.2–1.2)
BUN SERPL-MCNC: 18 MG/DL — SIGNIFICANT CHANGE UP (ref 10–20)
CALCIUM SERPL-MCNC: 8.3 MG/DL — LOW (ref 8.4–10.5)
CHLORIDE SERPL-SCNC: 104 MMOL/L — SIGNIFICANT CHANGE UP (ref 98–110)
CO2 SERPL-SCNC: 23 MMOL/L — SIGNIFICANT CHANGE UP (ref 17–32)
CREAT SERPL-MCNC: 1 MG/DL — SIGNIFICANT CHANGE UP (ref 0.7–1.5)
EGFR: 86 ML/MIN/1.73M2 — SIGNIFICANT CHANGE UP
EOSINOPHIL # BLD AUTO: 0.06 K/UL — SIGNIFICANT CHANGE UP (ref 0–0.7)
EOSINOPHIL NFR BLD AUTO: 0.8 % — SIGNIFICANT CHANGE UP (ref 0–8)
GAS PNL BLDA: SIGNIFICANT CHANGE UP
GLUCOSE BLDC GLUCOMTR-MCNC: 102 MG/DL — HIGH (ref 70–99)
GLUCOSE BLDC GLUCOMTR-MCNC: 121 MG/DL — HIGH (ref 70–99)
GLUCOSE BLDC GLUCOMTR-MCNC: 137 MG/DL — HIGH (ref 70–99)
GLUCOSE BLDC GLUCOMTR-MCNC: 143 MG/DL — HIGH (ref 70–99)
GLUCOSE BLDC GLUCOMTR-MCNC: 144 MG/DL — HIGH (ref 70–99)
GLUCOSE BLDC GLUCOMTR-MCNC: 159 MG/DL — HIGH (ref 70–99)
GLUCOSE BLDC GLUCOMTR-MCNC: 163 MG/DL — HIGH (ref 70–99)
GLUCOSE BLDC GLUCOMTR-MCNC: 170 MG/DL — HIGH (ref 70–99)
GLUCOSE BLDC GLUCOMTR-MCNC: 191 MG/DL — HIGH (ref 70–99)
GLUCOSE BLDC GLUCOMTR-MCNC: 96 MG/DL — SIGNIFICANT CHANGE UP (ref 70–99)
GLUCOSE BLDC GLUCOMTR-MCNC: 96 MG/DL — SIGNIFICANT CHANGE UP (ref 70–99)
GLUCOSE SERPL-MCNC: 147 MG/DL — HIGH (ref 70–99)
HCT VFR BLD CALC: 33.6 % — LOW (ref 42–52)
HCT VFR BLD CALC: 39.9 % — LOW (ref 42–52)
HGB BLD-MCNC: 11.8 G/DL — LOW (ref 14–18)
HGB BLD-MCNC: 14.1 G/DL — SIGNIFICANT CHANGE UP (ref 14–18)
IMM GRANULOCYTES NFR BLD AUTO: 1 % — HIGH (ref 0.1–0.3)
INR BLD: 1 RATIO — SIGNIFICANT CHANGE UP (ref 0.65–1.3)
LYMPHOCYTES # BLD AUTO: 2.55 K/UL — SIGNIFICANT CHANGE UP (ref 1.2–3.4)
LYMPHOCYTES # BLD AUTO: 35.6 % — SIGNIFICANT CHANGE UP (ref 20.5–51.1)
MAGNESIUM SERPL-MCNC: 2.7 MG/DL — HIGH (ref 1.8–2.4)
MCHC RBC-ENTMCNC: 31.2 PG — HIGH (ref 27–31)
MCHC RBC-ENTMCNC: 31.3 PG — HIGH (ref 27–31)
MCHC RBC-ENTMCNC: 35.1 G/DL — SIGNIFICANT CHANGE UP (ref 32–37)
MCHC RBC-ENTMCNC: 35.3 G/DL — SIGNIFICANT CHANGE UP (ref 32–37)
MCV RBC AUTO: 88.7 FL — SIGNIFICANT CHANGE UP (ref 80–94)
MCV RBC AUTO: 88.9 FL — SIGNIFICANT CHANGE UP (ref 80–94)
MONOCYTES # BLD AUTO: 0.52 K/UL — SIGNIFICANT CHANGE UP (ref 0.1–0.6)
MONOCYTES NFR BLD AUTO: 7.3 % — SIGNIFICANT CHANGE UP (ref 1.7–9.3)
NEUTROPHILS # BLD AUTO: 3.93 K/UL — SIGNIFICANT CHANGE UP (ref 1.4–6.5)
NEUTROPHILS NFR BLD AUTO: 54.7 % — SIGNIFICANT CHANGE UP (ref 42.2–75.2)
NRBC # BLD: 0 /100 WBCS — SIGNIFICANT CHANGE UP (ref 0–0)
NRBC # BLD: 0 /100 WBCS — SIGNIFICANT CHANGE UP (ref 0–0)
NRBC BLD-RTO: 0 /100 WBCS — SIGNIFICANT CHANGE UP (ref 0–0)
NRBC BLD-RTO: 0 /100 WBCS — SIGNIFICANT CHANGE UP (ref 0–0)
PLATELET # BLD AUTO: 153 K/UL — SIGNIFICANT CHANGE UP (ref 130–400)
PLATELET # BLD AUTO: 175 K/UL — SIGNIFICANT CHANGE UP (ref 130–400)
PMV BLD: 10.3 FL — SIGNIFICANT CHANGE UP (ref 7.4–10.4)
PMV BLD: 9.9 FL — SIGNIFICANT CHANGE UP (ref 7.4–10.4)
POTASSIUM SERPL-MCNC: 4.3 MMOL/L — SIGNIFICANT CHANGE UP (ref 3.5–5)
POTASSIUM SERPL-SCNC: 4.3 MMOL/L — SIGNIFICANT CHANGE UP (ref 3.5–5)
PROT SERPL-MCNC: 4.6 G/DL — LOW (ref 6–8)
PROTHROM AB SERPL-ACNC: 11.8 SEC — SIGNIFICANT CHANGE UP (ref 9.95–12.87)
RBC # BLD: 3.78 M/UL — LOW (ref 4.7–6.1)
RBC # BLD: 4.5 M/UL — LOW (ref 4.7–6.1)
RBC # FLD: 12.6 % — SIGNIFICANT CHANGE UP (ref 11.5–14.5)
RBC # FLD: 12.6 % — SIGNIFICANT CHANGE UP (ref 11.5–14.5)
SODIUM SERPL-SCNC: 138 MMOL/L — SIGNIFICANT CHANGE UP (ref 135–146)
WBC # BLD: 14.69 K/UL — HIGH (ref 4.8–10.8)
WBC # BLD: 7.17 K/UL — SIGNIFICANT CHANGE UP (ref 4.8–10.8)
WBC # FLD AUTO: 14.69 K/UL — HIGH (ref 4.8–10.8)
WBC # FLD AUTO: 7.17 K/UL — SIGNIFICANT CHANGE UP (ref 4.8–10.8)

## 2025-01-14 PROCEDURE — 33405 REPLACEMENT AORTIC VALVE OPN: CPT | Mod: AS

## 2025-01-14 PROCEDURE — 94760 N-INVAS EAR/PLS OXIMETRY 1: CPT

## 2025-01-14 PROCEDURE — 83605 ASSAY OF LACTIC ACID: CPT

## 2025-01-14 PROCEDURE — 97530 THERAPEUTIC ACTIVITIES: CPT | Mod: GP

## 2025-01-14 PROCEDURE — 93010 ELECTROCARDIOGRAM REPORT: CPT

## 2025-01-14 PROCEDURE — C1769: CPT

## 2025-01-14 PROCEDURE — 82962 GLUCOSE BLOOD TEST: CPT

## 2025-01-14 PROCEDURE — 84132 ASSAY OF SERUM POTASSIUM: CPT

## 2025-01-14 PROCEDURE — C1751: CPT

## 2025-01-14 PROCEDURE — 33405 REPLACEMENT AORTIC VALVE OPN: CPT

## 2025-01-14 PROCEDURE — 86923 COMPATIBILITY TEST ELECTRIC: CPT

## 2025-01-14 PROCEDURE — 97116 GAIT TRAINING THERAPY: CPT | Mod: GP

## 2025-01-14 PROCEDURE — 97110 THERAPEUTIC EXERCISES: CPT | Mod: GP

## 2025-01-14 PROCEDURE — 85027 COMPLETE CBC AUTOMATED: CPT

## 2025-01-14 PROCEDURE — 83735 ASSAY OF MAGNESIUM: CPT

## 2025-01-14 PROCEDURE — 82330 ASSAY OF CALCIUM: CPT

## 2025-01-14 PROCEDURE — 85025 COMPLETE CBC W/AUTO DIFF WBC: CPT

## 2025-01-14 PROCEDURE — 88305 TISSUE EXAM BY PATHOLOGIST: CPT | Mod: 26

## 2025-01-14 PROCEDURE — 36415 COLL VENOUS BLD VENIPUNCTURE: CPT

## 2025-01-14 PROCEDURE — 94010 BREATHING CAPACITY TEST: CPT

## 2025-01-14 PROCEDURE — 85730 THROMBOPLASTIN TIME PARTIAL: CPT

## 2025-01-14 PROCEDURE — 94640 AIRWAY INHALATION TREATMENT: CPT

## 2025-01-14 PROCEDURE — 71045 X-RAY EXAM CHEST 1 VIEW: CPT | Mod: 26

## 2025-01-14 PROCEDURE — 99291 CRITICAL CARE FIRST HOUR: CPT

## 2025-01-14 PROCEDURE — P9045: CPT | Mod: JZ

## 2025-01-14 PROCEDURE — 84295 ASSAY OF SERUM SODIUM: CPT

## 2025-01-14 PROCEDURE — 71045 X-RAY EXAM CHEST 1 VIEW: CPT

## 2025-01-14 PROCEDURE — 85014 HEMATOCRIT: CPT

## 2025-01-14 PROCEDURE — 97162 PT EVAL MOD COMPLEX 30 MIN: CPT | Mod: GP

## 2025-01-14 PROCEDURE — 94002 VENT MGMT INPAT INIT DAY: CPT

## 2025-01-14 PROCEDURE — 86891 AUTOLOGOUS BLOOD OP SALVAGE: CPT

## 2025-01-14 PROCEDURE — C1886: CPT

## 2025-01-14 PROCEDURE — 85610 PROTHROMBIN TIME: CPT

## 2025-01-14 PROCEDURE — 85018 HEMOGLOBIN: CPT

## 2025-01-14 PROCEDURE — 93005 ELECTROCARDIOGRAM TRACING: CPT

## 2025-01-14 PROCEDURE — C1889: CPT

## 2025-01-14 PROCEDURE — 80053 COMPREHEN METABOLIC PANEL: CPT

## 2025-01-14 PROCEDURE — 82803 BLOOD GASES ANY COMBINATION: CPT

## 2025-01-14 PROCEDURE — 88305 TISSUE EXAM BY PATHOLOGIST: CPT

## 2025-01-14 PROCEDURE — C1768: CPT

## 2025-01-14 RX ORDER — FAMOTIDINE 10 MG/ML
20 INJECTION INTRAVENOUS EVERY 12 HOURS
Refills: 0 | Status: DISCONTINUED | OUTPATIENT
Start: 2025-01-14 | End: 2025-01-15

## 2025-01-14 RX ORDER — MEPERIDINE HCL 100 MG
25 TABLET ORAL ONCE
Refills: 0 | Status: DISCONTINUED | OUTPATIENT
Start: 2025-01-14 | End: 2025-01-14

## 2025-01-14 RX ORDER — NITROGLYCERIN 0.4 MG/1
12 TABLET SUBLINGUAL
Qty: 50 | Refills: 0 | Status: DISCONTINUED | OUTPATIENT
Start: 2025-01-14 | End: 2025-01-15

## 2025-01-14 RX ORDER — ANTISEPTIC SURGICAL SCRUB 0.04 MG/ML
1 SOLUTION TOPICAL DAILY
Refills: 0 | Status: DISCONTINUED | OUTPATIENT
Start: 2025-01-14 | End: 2025-01-19

## 2025-01-14 RX ORDER — CEFAZOLIN SODIUM IN 0.9 % NACL 2 G/10 ML
2000 SYRINGE (ML) INTRAVENOUS EVERY 8 HOURS
Refills: 0 | Status: DISCONTINUED | OUTPATIENT
Start: 2025-01-14 | End: 2025-01-14

## 2025-01-14 RX ORDER — OXYCODONE HYDROCHLORIDE 30 MG/1
10 TABLET ORAL EVERY 4 HOURS
Refills: 0 | Status: DISCONTINUED | OUTPATIENT
Start: 2025-01-14 | End: 2025-01-17

## 2025-01-14 RX ORDER — AMIODARONE HYDROCHLORIDE 50 MG/ML
0.5 INJECTION, SOLUTION INTRAVENOUS
Qty: 450 | Refills: 0 | Status: DISCONTINUED | OUTPATIENT
Start: 2025-01-14 | End: 2025-01-15

## 2025-01-14 RX ORDER — VANCOMYCIN HYDROCHLORIDE 50 MG/ML
1000 KIT ORAL EVERY 12 HOURS
Refills: 0 | Status: COMPLETED | OUTPATIENT
Start: 2025-01-14 | End: 2025-01-15

## 2025-01-14 RX ORDER — DM/PSEUDOEPHED/ACETAMINOPHEN 10-30-250
25 CAPSULE ORAL
Refills: 0 | Status: DISCONTINUED | OUTPATIENT
Start: 2025-01-14 | End: 2025-01-19

## 2025-01-14 RX ORDER — ACETAMINOPHEN 160 MG/5ML
1000 SUSPENSION ORAL ONCE
Refills: 0 | Status: COMPLETED | OUTPATIENT
Start: 2025-01-14 | End: 2025-01-14

## 2025-01-14 RX ORDER — FAMOTIDINE 10 MG/ML
20 INJECTION INTRAVENOUS EVERY 12 HOURS
Refills: 0 | Status: DISCONTINUED | OUTPATIENT
Start: 2025-01-14 | End: 2025-01-14

## 2025-01-14 RX ORDER — ACETAMINOPHEN 160 MG/5ML
650 SUSPENSION ORAL EVERY 6 HOURS
Refills: 0 | Status: DISCONTINUED | OUTPATIENT
Start: 2025-01-14 | End: 2025-01-15

## 2025-01-14 RX ORDER — VANCOMYCIN HYDROCHLORIDE 50 MG/ML
1000 KIT ORAL EVERY 12 HOURS
Refills: 0 | Status: DISCONTINUED | OUTPATIENT
Start: 2025-01-14 | End: 2025-01-14

## 2025-01-14 RX ORDER — OXYCODONE HYDROCHLORIDE 30 MG/1
10 TABLET ORAL EVERY 4 HOURS
Refills: 0 | Status: DISCONTINUED | OUTPATIENT
Start: 2025-01-14 | End: 2025-01-14

## 2025-01-14 RX ORDER — ANTISEPTIC SURGICAL SCRUB 0.04 MG/ML
15 SOLUTION TOPICAL EVERY 12 HOURS
Refills: 0 | Status: DISCONTINUED | OUTPATIENT
Start: 2025-01-14 | End: 2025-01-15

## 2025-01-14 RX ORDER — DEXMEDETOMIDINE HYDROCHLORIDE 4 UG/ML
0.02 INJECTION, SOLUTION INTRAVENOUS
Qty: 200 | Refills: 0 | Status: DISCONTINUED | OUTPATIENT
Start: 2025-01-14 | End: 2025-01-14

## 2025-01-14 RX ORDER — ACETAMINOPHEN 160 MG/5ML
1000 SUSPENSION ORAL ONCE
Refills: 0 | Status: DISCONTINUED | OUTPATIENT
Start: 2025-01-14 | End: 2025-01-14

## 2025-01-14 RX ORDER — PROPOFOL 10 MG/ML
15 INJECTION, EMULSION INTRAVENOUS
Qty: 1000 | Refills: 0 | Status: DISCONTINUED | OUTPATIENT
Start: 2025-01-14 | End: 2025-01-15

## 2025-01-14 RX ORDER — NOREPINEPHRINE BITARTRATE 1 MG/ML
0.05 INJECTION, SOLUTION, CONCENTRATE INTRAVENOUS
Qty: 8 | Refills: 0 | Status: DISCONTINUED | OUTPATIENT
Start: 2025-01-14 | End: 2025-01-14

## 2025-01-14 RX ORDER — BACTERIOSTATIC SODIUM CHLORIDE 0.9 %
1000 VIAL (ML) INJECTION
Refills: 0 | Status: DISCONTINUED | OUTPATIENT
Start: 2025-01-14 | End: 2025-01-14

## 2025-01-14 RX ORDER — ANTISEPTIC SURGICAL SCRUB 0.04 MG/ML
15 SOLUTION TOPICAL EVERY 12 HOURS
Refills: 0 | Status: DISCONTINUED | OUTPATIENT
Start: 2025-01-14 | End: 2025-01-14

## 2025-01-14 RX ORDER — NOREPINEPHRINE BITARTRATE 1 MG/ML
0.05 INJECTION, SOLUTION, CONCENTRATE INTRAVENOUS
Qty: 8 | Refills: 0 | Status: DISCONTINUED | OUTPATIENT
Start: 2025-01-14 | End: 2025-01-16

## 2025-01-14 RX ORDER — OXYCODONE HYDROCHLORIDE 30 MG/1
5 TABLET ORAL EVERY 4 HOURS
Refills: 0 | Status: DISCONTINUED | OUTPATIENT
Start: 2025-01-14 | End: 2025-01-14

## 2025-01-14 RX ORDER — HYDROMORPHONE HYDROCHLORIDE 4 MG/ML
0.5 INJECTION, SOLUTION INTRAMUSCULAR; INTRAVENOUS; SUBCUTANEOUS EVERY 6 HOURS
Refills: 0 | Status: DISCONTINUED | OUTPATIENT
Start: 2025-01-14 | End: 2025-01-15

## 2025-01-14 RX ORDER — DM/PSEUDOEPHED/ACETAMINOPHEN 10-30-250
50 CAPSULE ORAL
Refills: 0 | Status: DISCONTINUED | OUTPATIENT
Start: 2025-01-14 | End: 2025-01-14

## 2025-01-14 RX ORDER — HYDROMORPHONE HYDROCHLORIDE 4 MG/ML
0.5 INJECTION, SOLUTION INTRAMUSCULAR; INTRAVENOUS; SUBCUTANEOUS EVERY 6 HOURS
Refills: 0 | Status: DISCONTINUED | OUTPATIENT
Start: 2025-01-14 | End: 2025-01-14

## 2025-01-14 RX ORDER — NICARDIPINE 20 MG/1
5 CAPSULE ORAL
Qty: 40 | Refills: 0 | Status: DISCONTINUED | OUTPATIENT
Start: 2025-01-14 | End: 2025-01-14

## 2025-01-14 RX ORDER — ALBUMIN HUMAN 50 G/1000ML
1000 SOLUTION INTRAVENOUS ONCE
Refills: 0 | Status: COMPLETED | OUTPATIENT
Start: 2025-01-14 | End: 2025-01-14

## 2025-01-14 RX ORDER — MEPERIDINE HCL 100 MG
25 TABLET ORAL ONCE
Refills: 0 | Status: DISCONTINUED | OUTPATIENT
Start: 2025-01-14 | End: 2025-01-15

## 2025-01-14 RX ORDER — POLYETHYLENE GLYCOL 3350 17 G/17G
17 POWDER, FOR SOLUTION ORAL DAILY
Refills: 0 | Status: DISCONTINUED | OUTPATIENT
Start: 2025-01-15 | End: 2025-01-19

## 2025-01-14 RX ORDER — OXYCODONE HYDROCHLORIDE 30 MG/1
5 TABLET ORAL EVERY 4 HOURS
Refills: 0 | Status: DISCONTINUED | OUTPATIENT
Start: 2025-01-14 | End: 2025-01-17

## 2025-01-14 RX ORDER — FENTANYL CITRATE 50 UG/ML
25 INJECTION INTRAMUSCULAR; INTRAVENOUS
Refills: 0 | Status: DISCONTINUED | OUTPATIENT
Start: 2025-01-14 | End: 2025-01-14

## 2025-01-14 RX ORDER — NITROGLYCERIN 0.4 MG/1
15 TABLET SUBLINGUAL
Qty: 50 | Refills: 0 | Status: DISCONTINUED | OUTPATIENT
Start: 2025-01-14 | End: 2025-01-14

## 2025-01-14 RX ORDER — POLYETHYLENE GLYCOL 3350 17 G/17G
17 POWDER, FOR SOLUTION ORAL DAILY
Refills: 0 | Status: CANCELLED | OUTPATIENT
Start: 2025-01-15 | End: 2025-01-14

## 2025-01-14 RX ORDER — BISACODYL 5 MG
10 TABLET, DELAYED RELEASE (ENTERIC COATED) ORAL ONCE
Refills: 0 | Status: CANCELLED | OUTPATIENT
Start: 2025-01-16 | End: 2025-01-14

## 2025-01-14 RX ORDER — ACETAMINOPHEN 160 MG/5ML
650 SUSPENSION ORAL EVERY 6 HOURS
Refills: 0 | Status: DISCONTINUED | OUTPATIENT
Start: 2025-01-14 | End: 2025-01-14

## 2025-01-14 RX ORDER — NICARDIPINE 20 MG/1
5 CAPSULE ORAL
Qty: 40 | Refills: 0 | Status: DISCONTINUED | OUTPATIENT
Start: 2025-01-14 | End: 2025-01-16

## 2025-01-14 RX ORDER — BACTERIOSTATIC SODIUM CHLORIDE 0.9 %
1000 VIAL (ML) INJECTION
Refills: 0 | Status: DISCONTINUED | OUTPATIENT
Start: 2025-01-14 | End: 2025-01-17

## 2025-01-14 RX ORDER — DM/PSEUDOEPHED/ACETAMINOPHEN 10-30-250
25 CAPSULE ORAL
Refills: 0 | Status: DISCONTINUED | OUTPATIENT
Start: 2025-01-14 | End: 2025-01-14

## 2025-01-14 RX ORDER — SENNOSIDES 8.6 MG
2 TABLET ORAL AT BEDTIME
Refills: 0 | Status: CANCELLED | OUTPATIENT
Start: 2025-01-15 | End: 2025-01-14

## 2025-01-14 RX ORDER — ACETAMINOPHEN 160 MG/5ML
650 SUSPENSION ORAL EVERY 6 HOURS
Refills: 0 | Status: CANCELLED | OUTPATIENT
Start: 2025-01-17 | End: 2025-01-14

## 2025-01-14 RX ORDER — BISACODYL 5 MG
10 TABLET, DELAYED RELEASE (ENTERIC COATED) ORAL ONCE
Refills: 0 | Status: DISCONTINUED | OUTPATIENT
Start: 2025-01-16 | End: 2025-01-19

## 2025-01-14 RX ORDER — ACETAMINOPHEN 160 MG/5ML
1000 SUSPENSION ORAL ONCE
Refills: 0 | Status: COMPLETED | OUTPATIENT
Start: 2025-01-14 | End: 2025-01-15

## 2025-01-14 RX ORDER — DEXMEDETOMIDINE HYDROCHLORIDE 4 UG/ML
0.02 INJECTION, SOLUTION INTRAVENOUS
Qty: 200 | Refills: 0 | Status: DISCONTINUED | OUTPATIENT
Start: 2025-01-14 | End: 2025-01-15

## 2025-01-14 RX ORDER — SENNOSIDES 8.6 MG
2 TABLET ORAL AT BEDTIME
Refills: 0 | Status: DISCONTINUED | OUTPATIENT
Start: 2025-01-15 | End: 2025-01-19

## 2025-01-14 RX ORDER — KETOROLAC TROMETHAMINE 10 MG
15 TABLET ORAL ONCE
Refills: 0 | Status: DISCONTINUED | OUTPATIENT
Start: 2025-01-14 | End: 2025-01-14

## 2025-01-14 RX ORDER — ANTISEPTIC SURGICAL SCRUB 0.04 MG/ML
1 SOLUTION TOPICAL DAILY
Refills: 0 | Status: DISCONTINUED | OUTPATIENT
Start: 2025-01-14 | End: 2025-01-14

## 2025-01-14 RX ORDER — DM/PSEUDOEPHED/ACETAMINOPHEN 10-30-250
50 CAPSULE ORAL
Refills: 0 | Status: DISCONTINUED | OUTPATIENT
Start: 2025-01-14 | End: 2025-01-19

## 2025-01-14 RX ORDER — PROPOFOL 10 MG/ML
15 INJECTION, EMULSION INTRAVENOUS
Qty: 1000 | Refills: 0 | Status: DISCONTINUED | OUTPATIENT
Start: 2025-01-14 | End: 2025-01-14

## 2025-01-14 RX ORDER — CEFAZOLIN SODIUM IN 0.9 % NACL 2 G/10 ML
2000 SYRINGE (ML) INTRAVENOUS EVERY 8 HOURS
Refills: 0 | Status: COMPLETED | OUTPATIENT
Start: 2025-01-14 | End: 2025-01-16

## 2025-01-14 RX ADMIN — VANCOMYCIN HYDROCHLORIDE 250 MILLIGRAM(S): KIT at 17:45

## 2025-01-14 RX ADMIN — FENTANYL CITRATE 25 MICROGRAM(S): 50 INJECTION INTRAMUSCULAR; INTRAVENOUS at 18:30

## 2025-01-14 RX ADMIN — Medication 5 UNIT(S)/HR: at 14:44

## 2025-01-14 RX ADMIN — Medication 15 MILLIGRAM(S): at 15:00

## 2025-01-14 RX ADMIN — FENTANYL CITRATE 25 MICROGRAM(S): 50 INJECTION INTRAMUSCULAR; INTRAVENOUS at 18:06

## 2025-01-14 RX ADMIN — FENTANYL CITRATE 25 MICROGRAM(S): 50 INJECTION INTRAMUSCULAR; INTRAVENOUS at 20:12

## 2025-01-14 RX ADMIN — Medication 100 MILLIGRAM(S): at 22:24

## 2025-01-14 RX ADMIN — AMIODARONE HYDROCHLORIDE 16.7 MG/MIN: 50 INJECTION, SOLUTION INTRAVENOUS at 16:10

## 2025-01-14 RX ADMIN — ALBUMIN HUMAN 500 MILLILITER(S): 50 SOLUTION INTRAVENOUS at 14:46

## 2025-01-14 RX ADMIN — FAMOTIDINE 20 MILLIGRAM(S): 10 INJECTION INTRAVENOUS at 17:45

## 2025-01-14 RX ADMIN — ACETAMINOPHEN 400 MILLIGRAM(S): 160 SUSPENSION ORAL at 14:00

## 2025-01-14 RX ADMIN — Medication 20 MILLILITER(S): at 14:44

## 2025-01-14 RX ADMIN — ANTISEPTIC SURGICAL SCRUB 15 MILLILITER(S): 0.04 SOLUTION TOPICAL at 17:35

## 2025-01-14 RX ADMIN — Medication 15 MILLIGRAM(S): at 16:37

## 2025-01-14 RX ADMIN — NOREPINEPHRINE BITARTRATE 10.6 MICROGRAM(S)/KG/MIN: 1 INJECTION, SOLUTION, CONCENTRATE INTRAVENOUS at 14:44

## 2025-01-14 RX ADMIN — FENTANYL CITRATE 25 MICROGRAM(S): 50 INJECTION INTRAMUSCULAR; INTRAVENOUS at 19:55

## 2025-01-14 RX ADMIN — ACETAMINOPHEN 1000 MILLIGRAM(S): 160 SUSPENSION ORAL at 14:30

## 2025-01-14 RX ADMIN — Medication 200 GRAM(S): at 16:22

## 2025-01-14 RX ADMIN — NICARDIPINE 25 MG/HR: 20 CAPSULE ORAL at 14:45

## 2025-01-14 RX ADMIN — Medication 100 MILLIGRAM(S): at 15:33

## 2025-01-14 NOTE — DISCHARGE NOTE PROVIDER - CARE PROVIDER_API CALL
Ray Montemayor  Thoracic and Cardiac Surgery  [address] [place] [address], [address]  [place] [address]  Phone: [phone]  Fax: [phone]  Follow Up Time:     Jun Clark  Cardiology  [address] [place] [address], [address]  [place] [address]  Phone: [phone]  Fax: [phone]  Follow Up Time:

## 2025-01-14 NOTE — PROGRESS NOTE ADULT - SUBJECTIVE AND OBJECTIVE BOX
CTU Attending Progress Daily Note     14 Jan 2025 16:36  Procedure: AVR size 25  POD#: 0  pt came out of OR intubated vent support on Levo   placed on SBT after tapering off sedation then extubated sucessfuly      He has history of DM (diabetes mellitus)    HTN (hypertension)    H/O aortic valve stenosis      HPI:  pt came ad direct admit for AVR repplacemt hX sever AS        OBJECTIVE:  ICU Vital Signs Last 24 Hrs  T(C): 36.1 (14 Jan 2025 15:00), Max: 37.1 (14 Jan 2025 13:00)  T(F): 97 (14 Jan 2025 15:00), Max: 98.8 (14 Jan 2025 13:00)  HR: 96 (14 Jan 2025 15:30) (85 - 105)  BP: --  BP(mean): --  ABP: 104/62 (14 Jan 2025 15:30) (74/48 - 129/65)  ABP(mean): 74 (14 Jan 2025 15:30) (56 - 86)  RR: 13 (14 Jan 2025 15:30) (12 - 19)  SpO2: 97% (14 Jan 2025 15:30) (95% - 100%)    O2 Parameters below as of 14 Jan 2025 15:30  Patient On (Oxygen Delivery Method): mask, aerosol    O2 Concentration (%): 40      I&O's Summary    14 Jan 2025 07:01  -  14 Jan 2025 16:36  --------------------------------------------------------  IN: 1283 mL / OUT: 332 mL / NET: 951 mL      I&O's Detail    14 Jan 2025 07:01  -  14 Jan 2025 16:36  --------------------------------------------------------  IN:    Albumin 5%  - 500 mL: 1000 mL    Insulin: 3 mL    Insulin: 6 mL    IV PiggyBack: 150 mL    NiCARdipine: 10 mL    Norepinephrine: 10.6 mL    Propofol: 23.4 mL    sodium chloride 0.9%: 40 mL    sodium chloride 0.9%: 40 mL  Total IN: 1283 mL    OUT:    Chest Tube (mL): 52 mL    Indwelling Catheter - Urethral (mL): 280 mL    Nasogastric/Oral tube (mL): 0 mL  Total OUT: 332 mL    Total NET: 951 mL        Adult Advanced Hemodynamics Last 24 Hrs  CVP(mm Hg): 22 (14 Jan 2025 15:30) (11 - 25)  CVP(cm H2O): --  CO: 9.2 (14 Jan 2025 14:00) (8.3 - 9.2)  CI: 3.9 (14 Jan 2025 14:00) (3.5 - 3.9)  PA: 36/25 (14 Jan 2025 15:30) (29/17 - 40/31)  PA(mean): 31 (14 Jan 2025 15:30) (23 - 35)  PCWP: --  SVR: 399 (14 Jan 2025 14:00) (399 - 481)  SVRI: 942 (14 Jan 2025 14:00) (942 - 1141)  PVR: --  PVRI: --  Mode: AC/ CMV (Assist Control/ Continuous Mandatory Ventilation)  RR (machine): 16  TV (machine): 650  FiO2: 80  PEEP: 8  ITime: 1  MAP: 12  PIP: 23    CAPILLARY BLOOD GLUCOSE      POCT Blood Glucose.: 102 mg/dL (14 Jan 2025 15:57)  POCT Blood Glucose.: 159 mg/dL (14 Jan 2025 14:59)  POCT Blood Glucose.: 144 mg/dL (14 Jan 2025 14:08)  POCT Blood Glucose.: 143 mg/dL (14 Jan 2025 12:40)    LABS:  ABG - ( 14 Jan 2025 14:47 )  pH, Arterial: 7.47  pH, Blood: x     /  pCO2: 29    /  pO2: 185   / HCO3: 21    / Base Excess: -1.6  /  SaO2: 99.9                                    14.1   14.69 )-----------( 153      ( 14 Jan 2025 13:15 )             39.9     01-14    138  |  104  |  18  ----------------------------<  147[H]  4.3   |  23  |  1.0    Ca    8.3[L]      14 Jan 2025 13:15  Mg     2.7     01-14    TPro  4.6[L]  /  Alb  3.1[L]  /  TBili  0.6  /  DBili  x   /  AST  51[H]  /  ALT  21  /  AlkPhos  37  01-14    PT/INR - ( 14 Jan 2025 13:15 )   PT: 11.80 sec;   INR: 1.00 ratio         PTT - ( 14 Jan 2025 13:15 )  PTT:26.7 sec  Urinalysis Basic - ( 14 Jan 2025 13:15 )    Color: x / Appearance: x / SG: x / pH: x  Gluc: 147 mg/dL / Ketone: x  / Bili: x / Urobili: x   Blood: x / Protein: x / Nitrite: x   Leuk Esterase: x / RBC: x / WBC x   Sq Epi: x / Non Sq Epi: x / Bacteria: x        Home Medications:  lisinopril 5 mg oral tablet: 1 tab(s) orally once a day (14 Jan 2025 06:41)  metFORMIN 750 mg oral tablet, extended release: 1 tab(s) orally (14 Jan 2025 06:41)    HOSPITAL MEDICATIONS:  MEDICATIONS  (STANDING):  acetaminophen     Tablet .. 650 milliGRAM(s) Oral every 6 hours  acetaminophen   IVPB .. 1000 milliGRAM(s) IV Intermittent once  aMIOdarone Infusion 0.5 mG/Min (16.7 mL/Hr) IV Continuous <Continuous>  ceFAZolin   IVPB 2000 milliGRAM(s) IV Intermittent every 8 hours  chlorhexidine 0.12% Liquid 15 milliLiter(s) Oral Mucosa every 12 hours  chlorhexidine 0.12% Liquid 15 milliLiter(s) Oral Mucosa every 12 hours  chlorhexidine 2% Cloths 1 Application(s) Topical daily  dexMEDEtomidine Infusion 0.02 MICROgram(s)/kG/Hr (0.57 mL/Hr) IV Continuous <Continuous>  dextrose 50% Injectable 50 milliLiter(s) IV Push every 15 minutes  dextrose 50% Injectable 25 milliLiter(s) IV Push every 15 minutes  famotidine Injectable 20 milliGRAM(s) IV Push every 12 hours  insulin regular Infusion 5 Unit(s)/Hr (5 mL/Hr) IV Continuous <Continuous>  meperidine     Injectable 25 milliGRAM(s) IV Push once  niCARdipine Infusion 5 mG/Hr (25 mL/Hr) IV Continuous <Continuous>  nitroglycerin  Infusion 12 MICROgram(s)/Min (3.6 mL/Hr) IV Continuous <Continuous>  norepinephrine Infusion 0.05 MICROgram(s)/kG/Min (10.6 mL/Hr) IV Continuous <Continuous>  propofol Infusion 15 MICROgram(s)/kG/Min (10.2 mL/Hr) IV Continuous <Continuous>  sodium chloride 0.9%. 1000 milliLiter(s) (20 mL/Hr) IV Continuous <Continuous>  vancomycin  IVPB 1000 milliGRAM(s) IV Intermittent every 12 hours  vasopressin Infusion 0.04 Unit(s)/Min (6 mL/Hr) IV Continuous <Continuous>    MEDICATIONS  (PRN):  fentaNYL    Injectable 25 MICROGram(s) IV Push every 1 hour PRN Severe Pain (7 - 10)  HYDROmorphone  Injectable 0.5 milliGRAM(s) IV Push every 6 hours PRN Breakthrough Pain  oxyCODONE    IR 5 milliGRAM(s) Oral every 4 hours PRN Moderate Pain (4 - 6)  oxyCODONE    IR 10 milliGRAM(s) Oral every 4 hours PRN Severe Pain (7 - 10)    RADIOLOGY:  Chest X-ray Reviewed    REVIEW OF SYSTEMS:  CONSTITUTIONAL: [X] all negative; [ ] weakness, [ ] fevers, [ ] chills  EYES/ENT: [X] all negative; [ ] visual changes, [ ] vertigo, [ ] throat pain   NECK: [X] all negative; [ ] pain, [ ] stiffness  RESPIRATORY: [] all negative, [ ] cough, [ ] wheezing, [ ] hemoptysis, [ ] shortness of breath  CARDIOVASCULAR: [] all negative; [ ] chest pain, [ ] palpitations, [ ] orthopnea  GASTROINTESTINAL: [X] all negative; [ ]abdominal pain, [ ] nausea, [ ] vomiting, [ ] hematemesis, [ ] diarrhea, [ ] constipation, [ ] melena, [ ] hematochezia.  GENITOURINARY: [X] all negative; [ ] dysuria, [ ] frequency, [ ] hematuria  NEUROLOGICAL: [X] all negative; [ ] numbness, [ ] weakness  SKIN: [X] all negative; [ ] itching, [ ] burning, [ ] rashes, [ ] lesions   All other review of systems is negative unless indicated above.  [  ] Unable to assess ROS because     PHYSICAL EXAM:          CONSTITUTIONAL: Well-developed; well-nourished; in no acute distress.   	SKIN: warm, dry  	HEAD: Normocephalic; atraumatic.  	EYES: PERRL, EOM, no conj injection, sclera clear  	ENT: No nasal discharge; airway clear.  	NECK: Supple; non tender.   	CARD: S1, S2 normal; no murmurs, gallops, or rubs. Regular rate and rhythm.  no carotid bruits  	RESP: CTA B/L; good air movement No wheezes, rales or rhonchi.  	ABD: Soft, not tender, not distended,  no rebound or guarding, bowel sounds present  	EXT: Normal ROM.  No clubbing, cyanosis or edema.   warm and well perfused.  pulses palpable  	NEURO: Alert, awake, motor 5/5 R, 5/5 L        Upon my evaluation, the above patient has a high probabillity of imminent or life threatening deterioration due to a high risk for Shock, Cardiogenic shock, arrythmias, acute blood loss, acute kidney injury and acute pulmonary insufficiency which required my direct attention, intervention, and personal management.  Total time was 55 minutes which includes review of radiology results, ordering, interpreting and reviewing diagnostic studies / lab tests with immediate assessment and treatment, consultant collaboration on findings and treatment options, monitoring for potential decompensation, obtaining history from family, EMT, nursing home staff and/or treating physicians; directing the titration of pressors, oxygen support devices, fluid resucitation, interpretation of cardiac output measurements, interpretation of radiological assessments, interpretation of EKG / rhythm strips, telemetry.  This time did not overlap with the management of other patients or performing separately reportable procedures.      Management of this patient was discussed with the CT surgery attending and mid-level providers.    I ackknowledge the use of copied documentation.  All copy forward documentation is my own and has been reviewed and revised as appropriate.  If no changes were made, it is because that information remains the same.

## 2025-01-14 NOTE — DISCHARGE NOTE PROVIDER - NSDCHHNEEDSERVICE_GEN_ALL_CORE
Medication teaching and assessment/Teaching and training/Wound care and assessment Medication teaching and assessment/Observation and assessment/Teaching and training/Wound care and assessment

## 2025-01-14 NOTE — DISCHARGE NOTE PROVIDER - NSDCCPTREATMENT_GEN_ALL_CORE_FT
PRINCIPAL PROCEDURE  Procedure: Replacement, aortic valve, with KEILA  Findings and Treatment: Activities/Restrictions  1.	Do not – drive, lift, pull or push anything over 10 pounds for 8 weeks.  2.	Shower every night and carefully wash wound, pat dry.  Cover is wound is draining with dry sterile dressing. No baths or swimming for two months.   3.	Apply support stockings /ace wraps to legs as soon as you get out of bed in the morning, remove in evening.   4.	Do progressive walking exercises every day, gradually increasing to 30 to 40 minutes/day, five days a week and incentive spirometer 10 times every hour while awake  5.	DO NOT DRIVE OR CONSUME ALCOHOL WHILE TAKING PAIN MEDICATION.  Contact your Physician promptly if:  1.	 Signs of wound infection, such as increasing redness, swelling, pain or drainage from incision.  2.	Progressive shortness of breath or increasing difficulty with breathing when lying down.  3.	Excessive nausea, vomiting, diarrhea or coughing.  4.	Increase swelling of legs that does not resolve with leg elevation.  5.	Chest pain that spreads to arms, jaw or back or sudden development of numbness, weakness, difficulty speaking or facial droop – Call 911.  6.	Diabetics who are unable to keep finger stick glucose under 150 for three consecutive readings.  Instructions:  1.	 Keep a daily log for Temperature, pulse, blood pressure, and weight twice a day and Glucose if diabetic with each meal. Call office for Temp > 101, pulse greater than 110 or less than 55, BP first # greater than 160 or less than 100, 3 pound weight gain in 1 day or 5 pounds in 3 days  2.	Hold pillow to chest and grab elbows when you need to cough.

## 2025-01-14 NOTE — DISCHARGE NOTE PROVIDER - NSDCMRMEDTOKEN_GEN_ALL_CORE_FT
lisinopril 5 mg oral tablet: 1 tab(s) orally once a day  metFORMIN 750 mg oral tablet, extended release: 1 tab(s) orally   amiodarone 200 mg oral tablet: 1 tab(s) orally 2 times a day take one tab twice daily for 7 days then dec to 1 tab daily for 3 weeks  Aspi-Cor 81 mg oral delayed release tablet: 1 tab(s) orally once a day  Insulin Glargine Solostar Pen (concentrated) 300 units/mL subcutaneous solution: 30 unit(s) subcutaneous once a day  Insulin Pen Needles, 4mm: 1 application subcutaneously once a day. ** Use with insulin pen **  Lasix 40 mg oral tablet: 1 tab(s) orally once a day  metFORMIN 750 mg oral tablet, extended release: 1 tab(s) orally once a day  metoprolol tartrate 25 mg oral tablet: 1 tab(s) orally 2 times a day  Pepcid 20 mg oral tablet: 1 tab(s) orally 2 times a day  Percocet 5 mg-325 mg oral tablet: 1 tab(s) orally every 4 hours as needed for  moderate pain MDD: 6  Potassium Chloride (Eqv-K-Tab) 20 mEq oral tablet, extended release: 1 tab(s) orally once a day   amiodarone 200 mg oral tablet: 1 tab(s) orally 2 times a day take one tab twice daily for 7 days then dec to 1 tab daily for 3 weeks  Aspi-Cor 81 mg oral delayed release tablet: 1 tab(s) orally once a day  Insulin Glargine Solostar Pen (concentrated) 300 units/mL subcutaneous solution: 30 unit(s) subcutaneous once a day  Insulin Pen Needles, 4mm: 1 application subcutaneously once a day. ** Use with insulin pen **  Lasix 20 mg oral tablet: 1 tab(s) orally once a day  metFORMIN 750 mg oral tablet, extended release: 1 tab(s) orally once a day  metoprolol tartrate 25 mg oral tablet: 1 tab(s) orally 2 times a day  Pepcid 20 mg oral tablet: 1 tab(s) orally 2 times a day  Percocet 5 mg-325 mg oral tablet: 1 tab(s) orally every 4 hours as needed for  moderate pain MDD: 6  Potassium Chloride (Eqv-K-Tab) 20 mEq oral tablet, extended release: 1 tab(s) orally once a day

## 2025-01-14 NOTE — DISCHARGE NOTE PROVIDER - NSDCFUADDAPPT_GEN_ALL_CORE_FT
please call for appointment next week with Dr. Montemayor of CT surgery    please call cardiology for appointment in 2 weeks    please call pmd for appointment in 2-4 weeks

## 2025-01-14 NOTE — PROGRESS NOTE ADULT - ASSESSMENT
Assessment   s/p AVR size 25   POD #0  pt weaned off sedation SBT done by me and extubated ..some tachycardia later   amio drip started 0.5 mg    Plan:    Neuro:  Multimodal pain management  Tylenol, Lidoderm patch, gabapentin, opiates as needed  Monitor neuro status in the post op period    CV:  S/P AVR for sever AS  Monitor perfusion, , lactate, UOP, index and MVO2 if available  Maintain sbp 100- 105   Remove chest tubes when able    Resp:  Acute pulmonary insufficiency post surgery  was on vent support SBT done and extubated sucessfuly  Supplemental oxygen to maintain sats > 92%  Continuous pulse oximetry monitoring  IS / Chest PT  OOBTC and Ambulate  Nebulizers as needed    GI:  Heart healthy diet  Bowel Regimen - escalate as needed  PUD ppx per protocol    Renal  High risk for DEVIKA post surgery  Monitor UOP, lytes, creatinine    Keep K > 4, Mg > 2    Endo:  Tight glucose control per [facility] protocol  Insulin gtt as needed  Transition to Lantus / SSI and premeal insulin as needed    Heme:  Acute blood loss anemia post surgery  High risk for thrombocytopenia  DVT prophylaxis once bleeding risk post surgery is minimized    ID:  Perioperative prophylactic abx per protocol  Monitor fever curve and WBC count  Remove TLC when no longer indicated        Upon my evaluation, the above patient has a high probability of imminent or life threatening deterioration due to a high risk for Shock, Cardiogenic shock, arrythmia, acute blood loss, acute kidney injury and acute pulmonary insufficiency which required my direct attention, intervention, and personal management.  Total time was 55 minutes which includes review of radiology results, ordering, interpreting and reviewing diagnostic studies / lab tests with immediate assessment and treatment, consultant collaboration on findings and treatment options, monitoring for potential decompensation, obtaining history from family, EMT, nursing home staff and/or treating physicians; directing the titration of pressors, oxygen support devices, fluid resucitation, interpretation of cardiac output measurements, interpretation of radiological assessments, interpretation of EKG / rhythm strips, telemetry.  This time did not overlap with the management of other patients or performing separately reportable procedures.      Management of this patient was discussed with the CT surgery attending and mid-level providers.    I acknowledge the use of copied documentation.  All copy forward documentation is my own and has been reviewed and revised as appropriate.  If no changes were made, it is because that information remains the same. Assessment   s/p AVR size 25   POD #0  pt weaned off sedation SBT done by me and extubated ..some tachycardia later   amio drip started 0.5 mg    Plan:    Neuro:  Multimodal pain management  Tylenol, Lidoderm patch, gabapentin, opiates as needed  Monitor neuro status in the post op period    CV:  S/P AVR for sever AS  Monitor perfusion, , lactate, UOP, index and MVO2 if available  Hypotension  on Low dose Levo  Maintain sbp 100- 105    Remove chest tubes when able    Resp:  Acute pulmonary insufficiency post surgery  was on vent support SBT done and extubated sucessfuly  Supplemental oxygen to maintain sats > 92%  Continuous pulse oximetry monitoring  IS / Chest PT  OOBTC and Ambulate  Nebulizers as needed    GI:  Heart healthy diet  Bowel Regimen - escalate as needed  PUD ppx per protocol    Renal  High risk for DEVIKA post surgery  Monitor UOP, lytes, creatinine    Keep K > 4, Mg > 2    Endo:  Tight glucose control per [facility] protocol  Insulin gtt as needed  Transition to Lantus / SSI and premeal insulin as needed    Heme:  Acute blood loss anemia post surgery  High risk for thrombocytopenia  DVT prophylaxis once bleeding risk post surgery is minimized    ID:  Perioperative prophylactic abx per protocol  Monitor fever curve and WBC count  Remove TLC when no longer indicated        Upon my evaluation, the above patient has a high probability of imminent or life threatening deterioration due to a high risk for Shock, Cardiogenic shock, arrythmia, acute blood loss, acute kidney injury and acute pulmonary insufficiency which required my direct attention, intervention, and personal management.  Total time was 55 minutes which includes review of radiology results, ordering, interpreting and reviewing diagnostic studies / lab tests with immediate assessment and treatment, consultant collaboration on findings and treatment options, monitoring for potential decompensation, obtaining history from family, EMT, nursing home staff and/or treating physicians; directing the titration of pressors, oxygen support devices, fluid resucitation, interpretation of cardiac output measurements, interpretation of radiological assessments, interpretation of EKG / rhythm strips, telemetry.  This time did not overlap with the management of other patients or performing separately reportable procedures.      Management of this patient was discussed with the CT surgery attending and mid-level providers.    I acknowledge the use of copied documentation.  All copy forward documentation is my own and has been reviewed and revised as appropriate.  If no changes were made, it is because that information remains the same.

## 2025-01-14 NOTE — BRIEF OPERATIVE NOTE - COMMENTS
I, Dr. Turk, was present as the first assistant for the major duration of the case including the entire duration of cardiopulmonary bypass and cross-clamp, replacement of the aortic valve and then subsequently for weaning from cardiopulmonary bypass

## 2025-01-15 LAB
ALBUMIN SERPL ELPH-MCNC: 3.7 G/DL — SIGNIFICANT CHANGE UP (ref 3.5–5.2)
ALBUMIN SERPL ELPH-MCNC: 3.7 G/DL — SIGNIFICANT CHANGE UP (ref 3.5–5.2)
ALP SERPL-CCNC: 29 U/L — LOW (ref 30–115)
ALP SERPL-CCNC: 33 U/L — SIGNIFICANT CHANGE UP (ref 30–115)
ALT FLD-CCNC: 23 U/L — SIGNIFICANT CHANGE UP (ref 0–41)
ALT FLD-CCNC: 29 U/L — SIGNIFICANT CHANGE UP (ref 0–41)
ANION GAP SERPL CALC-SCNC: 11 MMOL/L — SIGNIFICANT CHANGE UP (ref 7–14)
ANION GAP SERPL CALC-SCNC: 9 MMOL/L — SIGNIFICANT CHANGE UP (ref 7–14)
APTT BLD: 25.5 SEC — LOW (ref 27–39.2)
AST SERPL-CCNC: 104 U/L — HIGH (ref 0–41)
AST SERPL-CCNC: 122 U/L — HIGH (ref 0–41)
BASE EXCESS BLDMV CALC-SCNC: -0.7 MMOL/L — SIGNIFICANT CHANGE UP
BASOPHILS # BLD AUTO: 0.03 K/UL — SIGNIFICANT CHANGE UP (ref 0–0.2)
BASOPHILS NFR BLD AUTO: 0.3 % — SIGNIFICANT CHANGE UP (ref 0–1)
BILIRUB SERPL-MCNC: 0.7 MG/DL — SIGNIFICANT CHANGE UP (ref 0.2–1.2)
BILIRUB SERPL-MCNC: 0.7 MG/DL — SIGNIFICANT CHANGE UP (ref 0.2–1.2)
BUN SERPL-MCNC: 14 MG/DL — SIGNIFICANT CHANGE UP (ref 10–20)
BUN SERPL-MCNC: 17 MG/DL — SIGNIFICANT CHANGE UP (ref 10–20)
CALCIUM SERPL-MCNC: 8.1 MG/DL — LOW (ref 8.4–10.5)
CALCIUM SERPL-MCNC: 8.2 MG/DL — LOW (ref 8.4–10.4)
CHLORIDE SERPL-SCNC: 107 MMOL/L — SIGNIFICANT CHANGE UP (ref 98–110)
CHLORIDE SERPL-SCNC: 108 MMOL/L — SIGNIFICANT CHANGE UP (ref 98–110)
CO2 SERPL-SCNC: 22 MMOL/L — SIGNIFICANT CHANGE UP (ref 17–32)
CO2 SERPL-SCNC: 22 MMOL/L — SIGNIFICANT CHANGE UP (ref 17–32)
CREAT SERPL-MCNC: 0.8 MG/DL — SIGNIFICANT CHANGE UP (ref 0.7–1.5)
CREAT SERPL-MCNC: 0.9 MG/DL — SIGNIFICANT CHANGE UP (ref 0.7–1.5)
EGFR: 101 ML/MIN/1.73M2 — SIGNIFICANT CHANGE UP
EGFR: 97 ML/MIN/1.73M2 — SIGNIFICANT CHANGE UP
EOSINOPHIL # BLD AUTO: 0 K/UL — SIGNIFICANT CHANGE UP (ref 0–0.7)
EOSINOPHIL NFR BLD AUTO: 0 % — SIGNIFICANT CHANGE UP (ref 0–8)
GAS PNL BLDA: SIGNIFICANT CHANGE UP
GAS PNL BLDMV: SIGNIFICANT CHANGE UP
GLUCOSE BLDC GLUCOMTR-MCNC: 106 MG/DL — HIGH (ref 70–99)
GLUCOSE BLDC GLUCOMTR-MCNC: 108 MG/DL — HIGH (ref 70–99)
GLUCOSE BLDC GLUCOMTR-MCNC: 112 MG/DL — HIGH (ref 70–99)
GLUCOSE BLDC GLUCOMTR-MCNC: 122 MG/DL — HIGH (ref 70–99)
GLUCOSE BLDC GLUCOMTR-MCNC: 123 MG/DL — HIGH (ref 70–99)
GLUCOSE BLDC GLUCOMTR-MCNC: 124 MG/DL — HIGH (ref 70–99)
GLUCOSE BLDC GLUCOMTR-MCNC: 132 MG/DL — HIGH (ref 70–99)
GLUCOSE BLDC GLUCOMTR-MCNC: 134 MG/DL — HIGH (ref 70–99)
GLUCOSE BLDC GLUCOMTR-MCNC: 135 MG/DL — HIGH (ref 70–99)
GLUCOSE BLDC GLUCOMTR-MCNC: 138 MG/DL — HIGH (ref 70–99)
GLUCOSE BLDC GLUCOMTR-MCNC: 142 MG/DL — HIGH (ref 70–99)
GLUCOSE BLDC GLUCOMTR-MCNC: 143 MG/DL — HIGH (ref 70–99)
GLUCOSE BLDC GLUCOMTR-MCNC: 146 MG/DL — HIGH (ref 70–99)
GLUCOSE BLDC GLUCOMTR-MCNC: 165 MG/DL — HIGH (ref 70–99)
GLUCOSE BLDC GLUCOMTR-MCNC: 167 MG/DL — HIGH (ref 70–99)
GLUCOSE BLDC GLUCOMTR-MCNC: 168 MG/DL — HIGH (ref 70–99)
GLUCOSE BLDC GLUCOMTR-MCNC: 170 MG/DL — HIGH (ref 70–99)
GLUCOSE BLDC GLUCOMTR-MCNC: 55 MG/DL — LOW (ref 70–99)
GLUCOSE BLDC GLUCOMTR-MCNC: 89 MG/DL — SIGNIFICANT CHANGE UP (ref 70–99)
GLUCOSE BLDC GLUCOMTR-MCNC: 99 MG/DL — SIGNIFICANT CHANGE UP (ref 70–99)
GLUCOSE SERPL-MCNC: 135 MG/DL — HIGH (ref 70–99)
GLUCOSE SERPL-MCNC: 148 MG/DL — HIGH (ref 70–99)
HCO3 BLDMV-SCNC: 24 MMOL/L — SIGNIFICANT CHANGE UP (ref 20–28)
HCT VFR BLD CALC: 34.4 % — LOW (ref 42–52)
HGB BLD-MCNC: 11.9 G/DL — LOW (ref 14–18)
IMM GRANULOCYTES NFR BLD AUTO: 0.7 % — HIGH (ref 0.1–0.3)
INR BLD: 1.05 RATIO — SIGNIFICANT CHANGE UP (ref 0.65–1.3)
LYMPHOCYTES # BLD AUTO: 0.55 K/UL — LOW (ref 1.2–3.4)
LYMPHOCYTES # BLD AUTO: 4.9 % — LOW (ref 20.5–51.1)
MAGNESIUM SERPL-MCNC: 2.2 MG/DL — SIGNIFICANT CHANGE UP (ref 1.8–2.4)
MCHC RBC-ENTMCNC: 31.2 PG — HIGH (ref 27–31)
MCHC RBC-ENTMCNC: 34.6 G/DL — SIGNIFICANT CHANGE UP (ref 32–37)
MCV RBC AUTO: 90.3 FL — SIGNIFICANT CHANGE UP (ref 80–94)
MONOCYTES # BLD AUTO: 1.06 K/UL — HIGH (ref 0.1–0.6)
MONOCYTES NFR BLD AUTO: 9.4 % — HIGH (ref 1.7–9.3)
NEUTROPHILS # BLD AUTO: 9.6 K/UL — HIGH (ref 1.4–6.5)
NEUTROPHILS NFR BLD AUTO: 84.7 % — HIGH (ref 42.2–75.2)
NRBC # BLD: 0 /100 WBCS — SIGNIFICANT CHANGE UP (ref 0–0)
NRBC BLD-RTO: 0 /100 WBCS — SIGNIFICANT CHANGE UP (ref 0–0)
O2 CT VFR BLD CALC: 45 MMHG — SIGNIFICANT CHANGE UP (ref 30–65)
PCO2 BLDMV: 40 MMHG — SIGNIFICANT CHANGE UP (ref 30–65)
PH BLDMV: 7.39 — SIGNIFICANT CHANGE UP
PLATELET # BLD AUTO: 120 K/UL — LOW (ref 130–400)
PMV BLD: 10.1 FL — SIGNIFICANT CHANGE UP (ref 7.4–10.4)
POTASSIUM SERPL-MCNC: 4.3 MMOL/L — SIGNIFICANT CHANGE UP (ref 3.5–5)
POTASSIUM SERPL-MCNC: 4.4 MMOL/L — SIGNIFICANT CHANGE UP (ref 3.5–5)
POTASSIUM SERPL-SCNC: 4.3 MMOL/L — SIGNIFICANT CHANGE UP (ref 3.5–5)
POTASSIUM SERPL-SCNC: 4.4 MMOL/L — SIGNIFICANT CHANGE UP (ref 3.5–5)
PROT SERPL-MCNC: 5 G/DL — LOW (ref 6–8)
PROT SERPL-MCNC: 5.4 G/DL — LOW (ref 6–8)
PROTHROM AB SERPL-ACNC: 12.4 SEC — SIGNIFICANT CHANGE UP (ref 9.95–12.87)
RBC # BLD: 3.81 M/UL — LOW (ref 4.7–6.1)
RBC # FLD: 13.1 % — SIGNIFICANT CHANGE UP (ref 11.5–14.5)
SAO2 % BLDMV: 75.7 — SIGNIFICANT CHANGE UP (ref 60–90)
SODIUM SERPL-SCNC: 138 MMOL/L — SIGNIFICANT CHANGE UP (ref 135–146)
SODIUM SERPL-SCNC: 141 MMOL/L — SIGNIFICANT CHANGE UP (ref 135–146)
WBC # BLD: 11.32 K/UL — HIGH (ref 4.8–10.8)
WBC # FLD AUTO: 11.32 K/UL — HIGH (ref 4.8–10.8)

## 2025-01-15 PROCEDURE — 99291 CRITICAL CARE FIRST HOUR: CPT

## 2025-01-15 PROCEDURE — 93010 ELECTROCARDIOGRAM REPORT: CPT

## 2025-01-15 PROCEDURE — 71045 X-RAY EXAM CHEST 1 VIEW: CPT | Mod: 26

## 2025-01-15 RX ORDER — ASPIRIN 81 MG/1
81 TABLET, COATED ORAL DAILY
Refills: 0 | Status: DISCONTINUED | OUTPATIENT
Start: 2025-01-15 | End: 2025-01-19

## 2025-01-15 RX ORDER — HEPARIN SODIUM,PORCINE 10000/ML
5000 VIAL (ML) INJECTION EVERY 8 HOURS
Refills: 0 | Status: DISCONTINUED | OUTPATIENT
Start: 2025-01-15 | End: 2025-01-19

## 2025-01-15 RX ORDER — FAMOTIDINE 10 MG/ML
20 INJECTION INTRAVENOUS
Refills: 0 | Status: DISCONTINUED | OUTPATIENT
Start: 2025-01-15 | End: 2025-01-19

## 2025-01-15 RX ORDER — METOPROLOL SUCCINATE 25 MG
25 TABLET, EXTENDED RELEASE 24 HR ORAL
Refills: 0 | Status: DISCONTINUED | OUTPATIENT
Start: 2025-01-15 | End: 2025-01-19

## 2025-01-15 RX ORDER — FENTANYL CITRATE 50 UG/ML
25 INJECTION INTRAMUSCULAR; INTRAVENOUS ONCE
Refills: 0 | Status: DISCONTINUED | OUTPATIENT
Start: 2025-01-15 | End: 2025-01-15

## 2025-01-15 RX ORDER — ACETAMINOPHEN 160 MG/5ML
1000 SUSPENSION ORAL ONCE
Refills: 0 | Status: COMPLETED | OUTPATIENT
Start: 2025-01-15 | End: 2025-01-15

## 2025-01-15 RX ORDER — AMIODARONE HYDROCHLORIDE 50 MG/ML
1 INJECTION, SOLUTION INTRAVENOUS
Qty: 450 | Refills: 0 | Status: COMPLETED | OUTPATIENT
Start: 2025-01-15 | End: 2025-01-16

## 2025-01-15 RX ADMIN — VANCOMYCIN HYDROCHLORIDE 250 MILLIGRAM(S): KIT at 06:53

## 2025-01-15 RX ADMIN — FENTANYL CITRATE 25 MICROGRAM(S): 50 INJECTION INTRAMUSCULAR; INTRAVENOUS at 07:00

## 2025-01-15 RX ADMIN — Medication 100 MILLIGRAM(S): at 06:53

## 2025-01-15 RX ADMIN — AMIODARONE HYDROCHLORIDE 33.3 MG/MIN: 50 INJECTION, SOLUTION INTRAVENOUS at 05:34

## 2025-01-15 RX ADMIN — Medication 100 MILLIGRAM(S): at 21:34

## 2025-01-15 RX ADMIN — ACETAMINOPHEN 1000 MILLIGRAM(S): 160 SUSPENSION ORAL at 08:24

## 2025-01-15 RX ADMIN — FENTANYL CITRATE 25 MICROGRAM(S): 50 INJECTION INTRAMUSCULAR; INTRAVENOUS at 06:45

## 2025-01-15 RX ADMIN — Medication 5000 UNIT(S): at 13:13

## 2025-01-15 RX ADMIN — OXYCODONE HYDROCHLORIDE 10 MILLIGRAM(S): 30 TABLET ORAL at 15:17

## 2025-01-15 RX ADMIN — HYDROMORPHONE HYDROCHLORIDE 0.5 MILLIGRAM(S): 4 INJECTION, SOLUTION INTRAMUSCULAR; INTRAVENOUS; SUBCUTANEOUS at 02:22

## 2025-01-15 RX ADMIN — OXYCODONE HYDROCHLORIDE 10 MILLIGRAM(S): 30 TABLET ORAL at 20:34

## 2025-01-15 RX ADMIN — ASPIRIN 81 MILLIGRAM(S): 81 TABLET, COATED ORAL at 11:40

## 2025-01-15 RX ADMIN — FAMOTIDINE 20 MILLIGRAM(S): 10 INJECTION INTRAVENOUS at 06:54

## 2025-01-15 RX ADMIN — Medication 2 TABLET(S): at 21:35

## 2025-01-15 RX ADMIN — OXYCODONE HYDROCHLORIDE 10 MILLIGRAM(S): 30 TABLET ORAL at 15:47

## 2025-01-15 RX ADMIN — Medication 25 MILLIGRAM(S): at 18:00

## 2025-01-15 RX ADMIN — VANCOMYCIN HYDROCHLORIDE 250 MILLIGRAM(S): KIT at 18:00

## 2025-01-15 RX ADMIN — Medication 5000 UNIT(S): at 21:36

## 2025-01-15 RX ADMIN — Medication 25 MILLIGRAM(S): at 15:14

## 2025-01-15 RX ADMIN — POLYETHYLENE GLYCOL 3350 17 GRAM(S): 17 POWDER, FOR SOLUTION ORAL at 11:40

## 2025-01-15 RX ADMIN — OXYCODONE HYDROCHLORIDE 10 MILLIGRAM(S): 30 TABLET ORAL at 21:34

## 2025-01-15 RX ADMIN — Medication 100 MILLIGRAM(S): at 13:13

## 2025-01-15 RX ADMIN — Medication 5 UNIT(S)/HR: at 01:37

## 2025-01-15 RX ADMIN — ACETAMINOPHEN 400 MILLIGRAM(S): 160 SUSPENSION ORAL at 19:16

## 2025-01-15 RX ADMIN — FAMOTIDINE 20 MILLIGRAM(S): 10 INJECTION INTRAVENOUS at 18:00

## 2025-01-15 RX ADMIN — ANTISEPTIC SURGICAL SCRUB 15 MILLILITER(S): 0.04 SOLUTION TOPICAL at 06:52

## 2025-01-15 RX ADMIN — ACETAMINOPHEN 1000 MILLIGRAM(S): 160 SUSPENSION ORAL at 19:31

## 2025-01-15 RX ADMIN — ACETAMINOPHEN 400 MILLIGRAM(S): 160 SUSPENSION ORAL at 08:09

## 2025-01-15 RX ADMIN — HYDROMORPHONE HYDROCHLORIDE 0.5 MILLIGRAM(S): 4 INJECTION, SOLUTION INTRAMUSCULAR; INTRAVENOUS; SUBCUTANEOUS at 02:07

## 2025-01-15 NOTE — PHYSICAL THERAPY INITIAL EVALUATION ADULT - GAIT DEVIATIONS NOTED, PT EVAL
Decreased speed, forward flexed posture with downward gaze, briefly correctable with VC's, +ADKINS that resolves with rest./decreased clifford/decreased step length/decreased stride length

## 2025-01-15 NOTE — PROGRESS NOTE ADULT - ASSESSMENT
Assessment   s/p AVR size 25   POD #0  pt weaned off sedation SBT done by me and extubated ..some tachycardia later   amio drip started 0.5 mg    Plan:    Neuro:  Multimodal pain management  Tylenol, Lidoderm patch, gabapentin, opiates as needed  Monitor neuro status in the post op period    CV:  S/P AVR for sever AS  Monitor perfusion, , lactate, UOP, index and MVO2 if available  Hypotension  on Low dose Levo  Maintain sbp 100- 105    Remove chest tubes when able    Resp:  Acute pulmonary insufficiency post surgery  was on vent support SBT done and extubated sucessfuly  Supplemental oxygen to maintain sats > 92%  Continuous pulse oximetry monitoring  IS / Chest PT  OOBTC and Ambulate  Nebulizers as needed    GI:  Heart healthy diet  Bowel Regimen - escalate as needed  PUD ppx per protocol    Renal  High risk for DEVIKA post surgery  Monitor UOP, lytes, creatinine    Keep K > 4, Mg > 2    Endo:  Tight glucose control per [facility] protocol  Insulin gtt as needed  Transition to Lantus / SSI and premeal insulin as needed    Heme:  Acute blood loss anemia post surgery  High risk for thrombocytopenia  DVT prophylaxis once bleeding risk post surgery is minimized    ID:  Perioperative prophylactic abx per protocol  Monitor fever curve and WBC count  Remove TLC when no longer indicated        Upon my evaluation, the above patient has a high probability of imminent or life threatening deterioration due to a high risk for Shock, Cardiogenic shock, arrythmia, acute blood loss, acute kidney injury and acute pulmonary insufficiency which required my direct attention, intervention, and personal management.  Total time was 55 minutes which includes review of radiology results, ordering, interpreting and reviewing diagnostic studies / lab tests with immediate assessment and treatment, consultant collaboration on findings and treatment options, monitoring for potential decompensation, obtaining history from family, EMT, nursing home staff and/or treating physicians; directing the titration of pressors, oxygen support devices, fluid resucitation, interpretation of cardiac output measurements, interpretation of radiological assessments, interpretation of EKG / rhythm strips, telemetry.  This time did not overlap with the management of other patients or performing separately reportable procedures.      Management of this patient was discussed with the CT surgery attending and mid-level providers.    I acknowledge the use of copied documentation.  All copy forward documentation is my own and has been reviewed and revised as appropriate.  If no changes were made, it is because that information remains the same.

## 2025-01-15 NOTE — PROGRESS NOTE ADULT - SUBJECTIVE AND OBJECTIVE BOX
OPERATIVE PROCEDURE(s):                POD # 1 AVR                       59yMale  SURGEON(s): ERIC Montemayor  SUBJECTIVE ASSESSMENT: incisional pain    Vital Signs Last 24 Hrs  T(F): 98.6 (15 [place] 2025 07:00), Max: 99.1 (15 [place] 2025 04:00)  HR: 94 (15 [place] 2025 07:00) (85 - 115)  BP: 96/51 (15 [place] 2025 00:00) (96/51 - 112/61)  BP(mean): 67 (15 [place] 2025 00:00) (67 - 80)  ABP: 127/55 (15 [place] 2025 07:00) (-8/-8 - 136/69)  ABP(mean): 77 (15 [place] 2025 07:00)  RR: 13 (15 [place] 2025 07:00) (8 - 29)  SpO2: 97% (15 [place] 2025 07:00) (92% - 100%)  CVP(mm Hg): -37 (15 [place] 2025 06:45)  CO: 8.1 (15 [place] 2025 05:00)  CI: 3.5 (15 [place] 2025 05:00)  PA: 0/0 (15 [place] 2025 06:45)  SVR: 562 (15 [place] 2025 05:00)  Mode: CPAP with PS  FiO2: 40  PEEP: 8  PS: 8    I&O's Detail    14 Jan 2025 07:01  -  15 [place] 2025 07:00  --------------------------------------------------------  IN:    Albumin 5%  - 500 mL: 1000 mL    Amiodarone: 233.8 mL    Amiodarone: 33.3 mL    Insulin: 6 mL    Insulin: 66 mL    IV PiggyBack: 550 mL    NiCARdipine: 10 mL    Norepinephrine: 10.6 mL    Norepinephrine: 19.1 mL    Propofol: 23.4 mL    sodium chloride 0.9%: 40 mL    sodium chloride 0.9%: 360 mL  Total IN: 2352.2 mL    OUT:    Chest Tube (mL): 300 mL    Indwelling Catheter - Urethral (mL): 1297 mL    Nasogastric/Oral tube (mL): 0 mL  Total OUT: 1597 mL  Net:   I&O's Detail    14 Jan 2025 07:01  -  15 [place] 2025 07:00  --------------------------------------------------------  Total NET: 755.2 mL      CAPILLARY BLOOD GLUCOSE    POCT Blood Glucose.: 123 mg/dL (15 [place] 2025 04:08)  POCT Blood Glucose.: 132 mg/dL (15 [place] 2025 03:08)  POCT Blood Glucose.: 124 mg/dL (15 [place] 2025 02:12)  POCT Blood Glucose.: 143 mg/dL (15 [place] 2025 01:21)  POCT Blood Glucose.: 142 mg/dL (15 [place] 2025 00:27)  POCT Blood Glucose.: 55 mg/dL (15 [place] 2025 00:25)  POCT Blood Glucose.: 96 mg/dL (14 Jan 2025 23:03)  POCT Blood Glucose.: 96 mg/dL (14 Jan 2025 21:53)  POCT Blood Glucose.: 121 mg/dL (14 Jan 2025 20:49)  POCT Blood Glucose.: 137 mg/dL (14 Jan 2025 19:55)  POCT Blood Glucose.: 163 mg/dL (14 Jan 2025 18:59)  POCT Blood Glucose.: 170 mg/dL (14 Jan 2025 17:58)  POCT Blood Glucose.: 191 mg/dL (14 Jan 2025 17:06)  POCT Blood Glucose.: 102 mg/dL (14 Jan 2025 15:57)  POCT Blood Glucose.: 159 mg/dL (14 Jan 2025 14:59)  POCT Blood Glucose.: 144 mg/dL (14 Jan 2025 14:08)  POCT Blood Glucose.: 143 mg/dL (14 Jan 2025 12:40)    Physical Exam:  General: NAD; A&Ox3  Cardiac: S1/S2, RRR, no murmur, no rubs  Lungs: unlabored shallow respirations, bilateral bs decreased at bases  Abdomen: Soft/NT/protuberant  Sternum: Intact, no click, incision healing well with no drainage  Extremities: No edema b/l lower extremities    Central Venous Catheter: Yes[]  critical patient   Juarez Catheter: Yes  [] , critical patient strict I&O  EPICARDIAL WIRES:  [] YES  BOWEL MOVEMENT:  NO,   CHEST TUBE(Left/Right):  [] YES       LABS:                        11.9[L]  11.32[H] )-----------( 120[L]    ( 15 [place] 2025 01:45 )             34.4[L]                        14.1   14.69[H] )-----------( 153      ( 14 Jan 2025 13:15 )             39.9[L]    01-15    141  |  108  |  17  ----------------------------<  148[H]  4.4   |  22  |  0.8  01-14    138  |  104  |  18  ----------------------------<  147[H]  4.3   |  23  |  1.0    Ca    8.1[L]      15 [place] 2025 01:45  Mg     2.2     01-15    TPro  5.0[L] [6.0 - 8.0]  /  Alb  3.7 [3.5 - 5.2]  /  TBili  0.7 [0.2 - 1.2]  /  DBili  x   /  AST  104[H] [0 - 41]  /  ALT  23 [0 - 41]  /  AlkPhos  29[L] [30 - 115]  01-15    PT/INR - ( 15 [place] 2025 01:45 )   PT: ;   INR: 1.05 ratio       PT/INR - ( 14 Jan 2025 13:15 )   PT: ;   INR: 1.00 ratio       PTT - ( 15 [place] 2025 01:45 )  PTT:25.5 sec, PTT - ( 14 Jan 2025 13:15 )  PTT:26.7 sec    ABG - ( 15 [place] 2025 04:55 )  pH: 7.40  /  pCO2: 38    /  pO2: 106   / HCO3: 24    / Base Excess: -1.1  /  SaO2: 99.8  /  LA: 1.2        RADIOLOGY & ADDITIONAL TESTS:  CXR:   EKG:  MEDICATIONS  (STANDING):  acetaminophen     Tablet .. 650 milliGRAM(s) Oral every 6 hours  acetaminophen   IVPB .. 1000 milliGRAM(s) IV Intermittent once  aMIOdarone Infusion 1 mG/Min (33.3 mL/Hr) IV Continuous <Continuous>  ceFAZolin   IVPB 2000 milliGRAM(s) IV Intermittent every 8 hours  chlorhexidine 0.12% Liquid 15 milliLiter(s) Oral Mucosa every 12 hours  chlorhexidine 0.12% Liquid 15 milliLiter(s) Oral Mucosa every 12 hours  chlorhexidine 2% Cloths 1 Application(s) Topical daily  dexMEDEtomidine Infusion 0.02 MICROgram(s)/kG/Hr (0.57 mL/Hr) IV Continuous <Continuous>  dextrose 50% Injectable 50 milliLiter(s) IV Push every 15 minutes  dextrose 50% Injectable 25 milliLiter(s) IV Push every 15 minutes  famotidine Injectable 20 milliGRAM(s) IV Push every 12 hours  fentaNYL    Injectable 25 MICROGram(s) IV Push once  insulin regular Infusion 5 Unit(s)/Hr (5 mL/Hr) IV Continuous <Continuous>  meperidine     Injectable 25 milliGRAM(s) IV Push once  niCARdipine Infusion 5 mG/Hr (25 mL/Hr) IV Continuous <Continuous>  nitroglycerin  Infusion 12 MICROgram(s)/Min (3.6 mL/Hr) IV Continuous <Continuous>  norepinephrine Infusion 0.05 MICROgram(s)/kG/Min (10.6 mL/Hr) IV Continuous <Continuous>  polyethylene glycol 3350 17 Gram(s) Oral daily  propofol Infusion 15 MICROgram(s)/kG/Min (10.2 mL/Hr) IV Continuous <Continuous>  senna 2 Tablet(s) Oral at bedtime  sodium chloride 0.9%. 1000 milliLiter(s) (20 mL/Hr) IV Continuous <Continuous>  vancomycin  IVPB 1000 milliGRAM(s) IV Intermittent every 12 hours  vasopressin Infusion 0.04 Unit(s)/Min (6 mL/Hr) IV Continuous <Continuous>    MEDICATIONS  (PRN):  HYDROmorphone  Injectable 0.5 milliGRAM(s) IV Push every 6 hours PRN Breakthrough Pain  oxyCODONE    IR 5 milliGRAM(s) Oral every 4 hours PRN Moderate Pain (4 - 6)  oxyCODONE    IR 10 milliGRAM(s) Oral every 4 hours PRN Severe Pain (7 - 10)    Allergies    No Known Allergies    Intolerances    Ambulation/Activity Status: ambulate with assistance    Assessment/Plan:  59y Male status-post AVR day 1  - Case and plan discussed with CTU Intensivist and CT Surgeon - Dr. Montemayor/Timoteo/Rosalee  - Continue CTU supportive care    - Start DVT prophylaxis  - Continue GI prophylaxis  - Incentive Spirometry 10 times an hour  - Start physical activity as tolerated and continue PT/OT as directed  - AS- s/p AVR - start ASA 81 will increase to 325 on discharge  - Anemia secondary to: Acute PO blood loss anemia,              track and trend H/H   stable  - Heart failure: Chronic:  Diastolic    secondary to valvular heart disease  - COPD/Hypoxia: expected PO respiratory insuffiencey secondary to prolonged anaesthesia, sternotomy and body habitus - supplement nasal O2 and wean to room air, incentive spirometer    - DM/Glucose Control: A1c 9.9 continue insulin drip today  - remove swan and groin a line  - frequent PVC's - continue amio gtt    Social Service Disposition:  anticipate home   OPERATIVE PROCEDURE(s):                POD # 1 AVR                       59yMale  SURGEON(s): ERIC Montemayor  SUBJECTIVE ASSESSMENT: incisional pain    Vital Signs Last 24 Hrs  T(F): 98.6 (15 [place] 2025 07:00), Max: 99.1 (15 [place] 2025 04:00)  HR: 94 (15 [place] 2025 07:00) (85 - 115)  BP: 96/51 (15 [place] 2025 00:00) (96/51 - 112/61)  BP(mean): 67 (15 [place] 2025 00:00) (67 - 80)  ABP: 127/55 (15 [place] 2025 07:00) (-8/-8 - 136/69)  ABP(mean): 77 (15 [place] 2025 07:00)  RR: 13 (15 [place] 2025 07:00) (8 - 29)  SpO2: 97% (15 [place] 2025 07:00) (92% - 100%)  CVP(mm Hg): -37 (15 [place] 2025 06:45)  CO: 8.1 (15 [place] 2025 05:00)  CI: 3.5 (15 [place] 2025 05:00)  PA: 0/0 (15 [place] 2025 06:45)  SVR: 562 (15 [place] 2025 05:00)  Mode: CPAP with PS  FiO2: 40  PEEP: 8  PS: 8    I&O's Detail    14 Jan 2025 07:01  -  15 [place] 2025 07:00  --------------------------------------------------------  IN:    Albumin 5%  - 500 mL: 1000 mL    Amiodarone: 233.8 mL    Amiodarone: 33.3 mL    Insulin: 6 mL    Insulin: 66 mL    IV PiggyBack: 550 mL    NiCARdipine: 10 mL    Norepinephrine: 10.6 mL    Norepinephrine: 19.1 mL    Propofol: 23.4 mL    sodium chloride 0.9%: 40 mL    sodium chloride 0.9%: 360 mL  Total IN: 2352.2 mL    OUT:    Chest Tube (mL): 300 mL    Indwelling Catheter - Urethral (mL): 1297 mL    Nasogastric/Oral tube (mL): 0 mL  Total OUT: 1597 mL  Net:   I&O's Detail    14 Jan 2025 07:01  -  15 [place] 2025 07:00  --------------------------------------------------------  Total NET: 755.2 mL      CAPILLARY BLOOD GLUCOSE    POCT Blood Glucose.: 123 mg/dL (15 [place] 2025 04:08)  POCT Blood Glucose.: 132 mg/dL (15 [place] 2025 03:08)  POCT Blood Glucose.: 124 mg/dL (15 [place] 2025 02:12)  POCT Blood Glucose.: 143 mg/dL (15 [place] 2025 01:21)  POCT Blood Glucose.: 142 mg/dL (15 [place] 2025 00:27)  POCT Blood Glucose.: 55 mg/dL (15 [place] 2025 00:25)  POCT Blood Glucose.: 96 mg/dL (14 Jan 2025 23:03)  POCT Blood Glucose.: 96 mg/dL (14 Jan 2025 21:53)  POCT Blood Glucose.: 121 mg/dL (14 Jan 2025 20:49)  POCT Blood Glucose.: 137 mg/dL (14 Jan 2025 19:55)  POCT Blood Glucose.: 163 mg/dL (14 Jan 2025 18:59)  POCT Blood Glucose.: 170 mg/dL (14 Jan 2025 17:58)  POCT Blood Glucose.: 191 mg/dL (14 Jan 2025 17:06)  POCT Blood Glucose.: 102 mg/dL (14 Jan 2025 15:57)  POCT Blood Glucose.: 159 mg/dL (14 Jan 2025 14:59)  POCT Blood Glucose.: 144 mg/dL (14 Jan 2025 14:08)  POCT Blood Glucose.: 143 mg/dL (14 Jan 2025 12:40)    Physical Exam:  General: NAD; A&Ox3  Cardiac: S1/S2, RRR, no murmur, no rubs  Lungs: unlabored shallow respirations, bilateral bs decreased at bases  Abdomen: Soft/NT/protuberant  Sternum: Intact, no click, incision healing well with no drainage  Extremities: No edema b/l lower extremities    Central Venous Catheter: Yes[x]  critical patient   Juarez Catheter: Yes  [x] , critical patient strict I&O  EPICARDIAL WIRES:  [x] YES  BOWEL MOVEMENT:  NO,   CHEST TUBE(Left/Right):  [x] YES       LABS:                        11.9[L]  11.32[H] )-----------( 120[L]    ( 15 [place] 2025 01:45 )             34.4[L]                        14.1   14.69[H] )-----------( 153      ( 14 Jan 2025 13:15 )             39.9[L]    01-15    141  |  108  |  17  ----------------------------<  148[H]  4.4   |  22  |  0.8  01-14    138  |  104  |  18  ----------------------------<  147[H]  4.3   |  23  |  1.0    Ca    8.1[L]      15 [place] 2025 01:45  Mg     2.2     01-15    TPro  5.0[L] [6.0 - 8.0]  /  Alb  3.7 [3.5 - 5.2]  /  TBili  0.7 [0.2 - 1.2]  /  DBili  x   /  AST  104[H] [0 - 41]  /  ALT  23 [0 - 41]  /  AlkPhos  29[L] [30 - 115]  01-15    PT/INR - ( 15 [place] 2025 01:45 )   PT: ;   INR: 1.05 ratio       PT/INR - ( 14 Jan 2025 13:15 )   PT: ;   INR: 1.00 ratio       PTT - ( 15 [place] 2025 01:45 )  PTT:25.5 sec, PTT - ( 14 Jan 2025 13:15 )  PTT:26.7 sec    ABG - ( 15 [place] 2025 04:55 )  pH: 7.40  /  pCO2: 38    /  pO2: 106   / HCO3: 24    / Base Excess: -1.1  /  SaO2: 99.8  /  LA: 1.2        RADIOLOGY & ADDITIONAL TESTS:  CXR: < from: Xray Chest 1 View- PORTABLE-Routine (01.15.25 @ 05:33) >  IMPRESSION:    Support devices: Stable right IJ [place]-Sandra catheter. Mediastinal drains   remain.    Cardiac/mediastinum/hilum: Stable.    Lung parenchyma/Pleura: No focal parenchymal opacities, pleural   effusions, or pneumothorax. Mild elevation of the right hemidiaphragm.    Skeleton/soft tissues: Stable.      < end of copied text >    EKG:< from: 12 Lead ECG (01.15.25 @ 07:29) >    Ventricular Rate 94 BPM    Atrial Rate 94 BPM    P-R Interval 138 ms    QRS Duration 88 ms    Q-T Interval 372 ms    QTC Calculation(Bazett) 465 ms    P Axis 28 degrees    R Axis 24 degrees    T Axis 236 degrees    Diagnosis Line Normal sinus rhythm  Septal infarct , age undetermined  ST & T wave abnormality, consider inferolateral ischemia  Abnormal ECG    < end of copied text >    MEDICATIONS  (STANDING):  acetaminophen     Tablet .. 650 milliGRAM(s) Oral every 6 hours  acetaminophen   IVPB .. 1000 milliGRAM(s) IV Intermittent once  aMIOdarone Infusion 1 mG/Min (33.3 mL/Hr) IV Continuous <Continuous>  ceFAZolin   IVPB 2000 milliGRAM(s) IV Intermittent every 8 hours  chlorhexidine 0.12% Liquid 15 milliLiter(s) Oral Mucosa every 12 hours  chlorhexidine 0.12% Liquid 15 milliLiter(s) Oral Mucosa every 12 hours  chlorhexidine 2% Cloths 1 Application(s) Topical daily  dexMEDEtomidine Infusion 0.02 MICROgram(s)/kG/Hr (0.57 mL/Hr) IV Continuous <Continuous>  dextrose 50% Injectable 50 milliLiter(s) IV Push every 15 minutes  dextrose 50% Injectable 25 milliLiter(s) IV Push every 15 minutes  famotidine Injectable 20 milliGRAM(s) IV Push every 12 hours  fentaNYL    Injectable 25 MICROGram(s) IV Push once  insulin regular Infusion 5 Unit(s)/Hr (5 mL/Hr) IV Continuous <Continuous>  meperidine     Injectable 25 milliGRAM(s) IV Push once  niCARdipine Infusion 5 mG/Hr (25 mL/Hr) IV Continuous <Continuous>  nitroglycerin  Infusion 12 MICROgram(s)/Min (3.6 mL/Hr) IV Continuous <Continuous>  norepinephrine Infusion 0.05 MICROgram(s)/kG/Min (10.6 mL/Hr) IV Continuous <Continuous>  polyethylene glycol 3350 17 Gram(s) Oral daily  propofol Infusion 15 MICROgram(s)/kG/Min (10.2 mL/Hr) IV Continuous <Continuous>  senna 2 Tablet(s) Oral at bedtime  sodium chloride 0.9%. 1000 milliLiter(s) (20 mL/Hr) IV Continuous <Continuous>  vancomycin  IVPB 1000 milliGRAM(s) IV Intermittent every 12 hours  vasopressin Infusion 0.04 Unit(s)/Min (6 mL/Hr) IV Continuous <Continuous>    MEDICATIONS  (PRN):  HYDROmorphone  Injectable 0.5 milliGRAM(s) IV Push every 6 hours PRN Breakthrough Pain  oxyCODONE    IR 5 milliGRAM(s) Oral every 4 hours PRN Moderate Pain (4 - 6)  oxyCODONE    IR 10 milliGRAM(s) Oral every 4 hours PRN Severe Pain (7 - 10)    Allergies    No Known Allergies    Intolerances    Ambulation/Activity Status: ambulate with assistance    Assessment/Plan:  59y Male status-post AVR day 1  - Case and plan discussed with CTU Intensivist and CT Surgeon - Dr. Montemayor/Timoteo/Rosalee  - Continue CTU supportive care    - Start DVT prophylaxis  - Continue GI prophylaxis  - Incentive Spirometry 10 times an hour  - Start physical activity as tolerated and continue PT/OT as directed  - AS- s/p AVR - start ASA 81 will increase to 325 on discharge  - Anemia secondary to: Acute PO blood loss anemia,              track and trend H/H   stable  - Heart failure: Chronic:  Diastolic    secondary to valvular heart disease  - COPD/Hypoxia: expected PO respiratory insuffiencey secondary to prolonged anaesthesia, sternotomy and body habitus - supplement nasal O2 and wean to room air, incentive spirometer    - DM/Glucose Control: A1c 9.9 continue insulin drip today  - remove swan and groin a line  - frequent PVC's - continue amio gtt    Social Service Disposition:  anticipate home   OPERATIVE PROCEDURE(s):                POD # 1 AVR                       59yMale  SURGEON(s): ERIC Montemayor  SUBJECTIVE ASSESSMENT: incisional pain    Vital Signs Last 24 Hrs  T(F): 98.6 (15 [place] 2025 07:00), Max: 99.1 (15 [place] 2025 04:00)  HR: 94 (15 [place] 2025 07:00) (85 - 115)  BP: 96/51 (15 [place] 2025 00:00) (96/51 - 112/61)  BP(mean): 67 (15 [place] 2025 00:00) (67 - 80)  ABP: 127/55 (15 [place] 2025 07:00) (-8/-8 - 136/69)  ABP(mean): 77 (15 [place] 2025 07:00)  RR: 13 (15 [place] 2025 07:00) (8 - 29)  SpO2: 97% (15 [place] 2025 07:00) (92% - 100%)  CVP(mm Hg): -37 (15 [place] 2025 06:45)  CO: 8.1 (15 [place] 2025 05:00)  CI: 3.5 (15 [place] 2025 05:00)  PA: 0/0 (15 [place] 2025 06:45)  SVR: 562 (15 [place] 2025 05:00)  Mode: CPAP with PS  FiO2: 40  PEEP: 8  PS: 8    I&O's Detail    14 Jan 2025 07:01  -  15 [place] 2025 07:00  --------------------------------------------------------  IN:    Albumin 5%  - 500 mL: 1000 mL    Amiodarone: 233.8 mL    Amiodarone: 33.3 mL    Insulin: 6 mL    Insulin: 66 mL    IV PiggyBack: 550 mL    NiCARdipine: 10 mL    Norepinephrine: 10.6 mL    Norepinephrine: 19.1 mL    Propofol: 23.4 mL    sodium chloride 0.9%: 40 mL    sodium chloride 0.9%: 360 mL  Total IN: 2352.2 mL    OUT:    Chest Tube (mL): 300 mL    Indwelling Catheter - Urethral (mL): 1297 mL    Nasogastric/Oral tube (mL): 0 mL  Total OUT: 1597 mL  Net:   I&O's Detail    14 Jan 2025 07:01  -  15 [place] 2025 07:00  --------------------------------------------------------  Total NET: 755.2 mL      CAPILLARY BLOOD GLUCOSE    POCT Blood Glucose.: 123 mg/dL (15 [place] 2025 04:08)  POCT Blood Glucose.: 132 mg/dL (15 [place] 2025 03:08)  POCT Blood Glucose.: 124 mg/dL (15 [place] 2025 02:12)  POCT Blood Glucose.: 143 mg/dL (15 [place] 2025 01:21)  POCT Blood Glucose.: 142 mg/dL (15 [place] 2025 00:27)  POCT Blood Glucose.: 55 mg/dL (15 [place] 2025 00:25)  POCT Blood Glucose.: 96 mg/dL (14 Jan 2025 23:03)  POCT Blood Glucose.: 96 mg/dL (14 Jan 2025 21:53)  POCT Blood Glucose.: 121 mg/dL (14 Jan 2025 20:49)  POCT Blood Glucose.: 137 mg/dL (14 Jan 2025 19:55)  POCT Blood Glucose.: 163 mg/dL (14 Jan 2025 18:59)  POCT Blood Glucose.: 170 mg/dL (14 Jan 2025 17:58)  POCT Blood Glucose.: 191 mg/dL (14 Jan 2025 17:06)  POCT Blood Glucose.: 102 mg/dL (14 Jan 2025 15:57)  POCT Blood Glucose.: 159 mg/dL (14 Jan 2025 14:59)  POCT Blood Glucose.: 144 mg/dL (14 Jan 2025 14:08)  POCT Blood Glucose.: 143 mg/dL (14 Jan 2025 12:40)    Physical Exam:  General: NAD; A&Ox3  Cardiac: S1/S2, RRR, no murmur, no rubs  Lungs: unlabored shallow respirations, bilateral bs decreased at bases  Abdomen: Soft/NT/protuberant/ hypoactive bs  Sternum: Intact, no click, incision healing well with no drainage  Extremities: No edema b/l lower extremities    Central Venous Catheter: Yes[x]  critical patient   Juarez Catheter: Yes  [x] , critical patient strict I&O  EPICARDIAL WIRES:  [x] YES  BOWEL MOVEMENT:  NO,   CHEST TUBE(Left/Right):  [x] YES       LABS:                        11.9[L]  11.32[H] )-----------( 120[L]    ( 15 [place] 2025 01:45 )             34.4[L]                        14.1   14.69[H] )-----------( 153      ( 14 Jan 2025 13:15 )             39.9[L]    01-15    141  |  108  |  17  ----------------------------<  148[H]  4.4   |  22  |  0.8  01-14    138  |  104  |  18  ----------------------------<  147[H]  4.3   |  23  |  1.0    Ca    8.1[L]      15 [place] 2025 01:45  Mg     2.2     01-15    TPro  5.0[L] [6.0 - 8.0]  /  Alb  3.7 [3.5 - 5.2]  /  TBili  0.7 [0.2 - 1.2]  /  DBili  x   /  AST  104[H] [0 - 41]  /  ALT  23 [0 - 41]  /  AlkPhos  29[L] [30 - 115]  01-15    PT/INR - ( 15 [place] 2025 01:45 )   PT: ;   INR: 1.05 ratio       PT/INR - ( 14 Jan 2025 13:15 )   PT: ;   INR: 1.00 ratio       PTT - ( 15 [place] 2025 01:45 )  PTT:25.5 sec, PTT - ( 14 Jan 2025 13:15 )  PTT:26.7 sec    ABG - ( 15 [place] 2025 04:55 )  pH: 7.40  /  pCO2: 38    /  pO2: 106   / HCO3: 24    / Base Excess: -1.1  /  SaO2: 99.8  /  LA: 1.2        RADIOLOGY & ADDITIONAL TESTS:  CXR: < from: Xray Chest 1 View- PORTABLE-Routine (01.15.25 @ 05:33) >  IMPRESSION:    Support devices: Stable right IJ [place]-Sandra catheter. Mediastinal drains   remain.    Cardiac/mediastinum/hilum: Stable.    Lung parenchyma/Pleura: No focal parenchymal opacities, pleural   effusions, or pneumothorax. Mild elevation of the right hemidiaphragm.    Skeleton/soft tissues: Stable.      < end of copied text >    EKG:< from: 12 Lead ECG (01.15.25 @ 07:29) >    Ventricular Rate 94 BPM    Atrial Rate 94 BPM    P-R Interval 138 ms    QRS Duration 88 ms    Q-T Interval 372 ms    QTC Calculation(Bazett) 465 ms    P Axis 28 degrees    R Axis 24 degrees    T Axis 236 degrees    Diagnosis Line Normal sinus rhythm  Septal infarct , age undetermined  ST & T wave abnormality, consider inferolateral ischemia  Abnormal ECG    < end of copied text >    MEDICATIONS  (STANDING):  acetaminophen     Tablet .. 650 milliGRAM(s) Oral every 6 hours  acetaminophen   IVPB .. 1000 milliGRAM(s) IV Intermittent once  aMIOdarone Infusion 1 mG/Min (33.3 mL/Hr) IV Continuous <Continuous>  ceFAZolin   IVPB 2000 milliGRAM(s) IV Intermittent every 8 hours  chlorhexidine 0.12% Liquid 15 milliLiter(s) Oral Mucosa every 12 hours  chlorhexidine 0.12% Liquid 15 milliLiter(s) Oral Mucosa every 12 hours  chlorhexidine 2% Cloths 1 Application(s) Topical daily  dexMEDEtomidine Infusion 0.02 MICROgram(s)/kG/Hr (0.57 mL/Hr) IV Continuous <Continuous>  dextrose 50% Injectable 50 milliLiter(s) IV Push every 15 minutes  dextrose 50% Injectable 25 milliLiter(s) IV Push every 15 minutes  famotidine Injectable 20 milliGRAM(s) IV Push every 12 hours  fentaNYL    Injectable 25 MICROGram(s) IV Push once  insulin regular Infusion 5 Unit(s)/Hr (5 mL/Hr) IV Continuous <Continuous>  meperidine     Injectable 25 milliGRAM(s) IV Push once  niCARdipine Infusion 5 mG/Hr (25 mL/Hr) IV Continuous <Continuous>  nitroglycerin  Infusion 12 MICROgram(s)/Min (3.6 mL/Hr) IV Continuous <Continuous>  norepinephrine Infusion 0.05 MICROgram(s)/kG/Min (10.6 mL/Hr) IV Continuous <Continuous>  polyethylene glycol 3350 17 Gram(s) Oral daily  propofol Infusion 15 MICROgram(s)/kG/Min (10.2 mL/Hr) IV Continuous <Continuous>  senna 2 Tablet(s) Oral at bedtime  sodium chloride 0.9%. 1000 milliLiter(s) (20 mL/Hr) IV Continuous <Continuous>  vancomycin  IVPB 1000 milliGRAM(s) IV Intermittent every 12 hours  vasopressin Infusion 0.04 Unit(s)/Min (6 mL/Hr) IV Continuous <Continuous>    MEDICATIONS  (PRN):  HYDROmorphone  Injectable 0.5 milliGRAM(s) IV Push every 6 hours PRN Breakthrough Pain  oxyCODONE    IR 5 milliGRAM(s) Oral every 4 hours PRN Moderate Pain (4 - 6)  oxyCODONE    IR 10 milliGRAM(s) Oral every 4 hours PRN Severe Pain (7 - 10)    Allergies    No Known Allergies    Intolerances    Ambulation/Activity Status: ambulate with assistance    Assessment/Plan:  59y Male status-post AVR day 1  - Case and plan discussed with CTU Intensivist and CT Surgeon - Dr. Montemayor/Timoteo/Rosalee  - Continue CTU supportive care    - Start DVT prophylaxis  - Continue GI prophylaxis change to PO  - Incentive Spirometry 10 times an hour  - Start physical activity as tolerated and continue PT/OT as directed  - AS- s/p AVR - start ASA 81 will increase to 325 on discharge  - Anemia secondary to: Acute PO blood loss anemia,              track and trend H/H   stable  - Heart failure: Chronic:  Diastolic    secondary to valvular heart disease  - COPD/Hypoxia: expected PO respiratory insuffiencey secondary to prolonged anaesthesia, sternotomy and body habitus - supplement nasal O2 and wean to room air, incentive spirometer    - DM/Glucose Control: A1c 9.9 continue insulin drip today  - remove swan and groin a line  - frequent PVC's - continue amio gtt    Social Service Disposition:  anticipate home

## 2025-01-15 NOTE — PHYSICAL THERAPY INITIAL EVALUATION ADULT - PERTINENT HX OF CURRENT PROBLEM, REHAB EVAL
Pt is a 58 y/o male who has a h/o severe AS. Pt had CTA revealing a Tri-Commissural Bicuspid Aortic Valve. Had CATH to assess CAD. PMHx of DM, HTN, HLD, basal cell cancer.  +ADKINS. Pt presents for b/s PT IE s/p AVR.

## 2025-01-15 NOTE — PHYSICAL THERAPY INITIAL EVALUATION ADULT - GENERAL OBSERVATIONS, REHAB EVAL
1100 - 1130 Pt rec semifowler in bed with +tele, +A line, +NC 4 L/m, +chest tube, +external pacer, +IV's, in NAD.

## 2025-01-16 LAB
ALBUMIN SERPL ELPH-MCNC: 3.5 G/DL — SIGNIFICANT CHANGE UP (ref 3.5–5.2)
ALP SERPL-CCNC: 52 U/L — SIGNIFICANT CHANGE UP (ref 30–115)
ALT FLD-CCNC: 36 U/L — SIGNIFICANT CHANGE UP (ref 0–41)
ANION GAP SERPL CALC-SCNC: 11 MMOL/L — SIGNIFICANT CHANGE UP (ref 7–14)
APTT BLD: 26.6 SEC — LOW (ref 27–39.2)
AST SERPL-CCNC: 97 U/L — HIGH (ref 0–41)
BASOPHILS # BLD AUTO: 0.04 K/UL — SIGNIFICANT CHANGE UP (ref 0–0.2)
BASOPHILS NFR BLD AUTO: 0.3 % — SIGNIFICANT CHANGE UP (ref 0–1)
BILIRUB SERPL-MCNC: 1.1 MG/DL — SIGNIFICANT CHANGE UP (ref 0.2–1.2)
BUN SERPL-MCNC: 15 MG/DL — SIGNIFICANT CHANGE UP (ref 10–20)
CALCIUM SERPL-MCNC: 7.9 MG/DL — LOW (ref 8.4–10.5)
CHLORIDE SERPL-SCNC: 102 MMOL/L — SIGNIFICANT CHANGE UP (ref 98–110)
CO2 SERPL-SCNC: 19 MMOL/L — SIGNIFICANT CHANGE UP (ref 17–32)
CREAT SERPL-MCNC: 0.8 MG/DL — SIGNIFICANT CHANGE UP (ref 0.7–1.5)
EGFR: 101 ML/MIN/1.73M2 — SIGNIFICANT CHANGE UP
EOSINOPHIL # BLD AUTO: 0.03 K/UL — SIGNIFICANT CHANGE UP (ref 0–0.7)
EOSINOPHIL NFR BLD AUTO: 0.2 % — SIGNIFICANT CHANGE UP (ref 0–8)
GLUCOSE BLDC GLUCOMTR-MCNC: 106 MG/DL — HIGH (ref 70–99)
GLUCOSE BLDC GLUCOMTR-MCNC: 124 MG/DL — HIGH (ref 70–99)
GLUCOSE BLDC GLUCOMTR-MCNC: 136 MG/DL — HIGH (ref 70–99)
GLUCOSE BLDC GLUCOMTR-MCNC: 151 MG/DL — HIGH (ref 70–99)
GLUCOSE BLDC GLUCOMTR-MCNC: 153 MG/DL — HIGH (ref 70–99)
GLUCOSE BLDC GLUCOMTR-MCNC: 155 MG/DL — HIGH (ref 70–99)
GLUCOSE BLDC GLUCOMTR-MCNC: 162 MG/DL — HIGH (ref 70–99)
GLUCOSE BLDC GLUCOMTR-MCNC: 166 MG/DL — HIGH (ref 70–99)
GLUCOSE SERPL-MCNC: 127 MG/DL — HIGH (ref 70–99)
HCT VFR BLD CALC: 35.3 % — LOW (ref 42–52)
HGB BLD-MCNC: 12.2 G/DL — LOW (ref 14–18)
IMM GRANULOCYTES NFR BLD AUTO: 0.5 % — HIGH (ref 0.1–0.3)
INR BLD: 1.16 RATIO — SIGNIFICANT CHANGE UP (ref 0.65–1.3)
LYMPHOCYTES # BLD AUTO: 1.72 K/UL — SIGNIFICANT CHANGE UP (ref 1.2–3.4)
LYMPHOCYTES # BLD AUTO: 13.8 % — LOW (ref 20.5–51.1)
MAGNESIUM SERPL-MCNC: 2.2 MG/DL — SIGNIFICANT CHANGE UP (ref 1.8–2.4)
MCHC RBC-ENTMCNC: 31.9 PG — HIGH (ref 27–31)
MCHC RBC-ENTMCNC: 34.6 G/DL — SIGNIFICANT CHANGE UP (ref 32–37)
MCV RBC AUTO: 92.4 FL — SIGNIFICANT CHANGE UP (ref 80–94)
MONOCYTES # BLD AUTO: 1.52 K/UL — HIGH (ref 0.1–0.6)
MONOCYTES NFR BLD AUTO: 12.2 % — HIGH (ref 1.7–9.3)
NEUTROPHILS # BLD AUTO: 9.07 K/UL — HIGH (ref 1.4–6.5)
NEUTROPHILS NFR BLD AUTO: 73 % — SIGNIFICANT CHANGE UP (ref 42.2–75.2)
NRBC # BLD: 0 /100 WBCS — SIGNIFICANT CHANGE UP (ref 0–0)
NRBC BLD-RTO: 0 /100 WBCS — SIGNIFICANT CHANGE UP (ref 0–0)
PLATELET # BLD AUTO: 106 K/UL — LOW (ref 130–400)
PMV BLD: 10.5 FL — HIGH (ref 7.4–10.4)
POTASSIUM SERPL-MCNC: 4.3 MMOL/L — SIGNIFICANT CHANGE UP (ref 3.5–5)
POTASSIUM SERPL-SCNC: 4.3 MMOL/L — SIGNIFICANT CHANGE UP (ref 3.5–5)
PROT SERPL-MCNC: 5.2 G/DL — LOW (ref 6–8)
PROTHROM AB SERPL-ACNC: 13.7 SEC — HIGH (ref 9.95–12.87)
RBC # BLD: 3.82 M/UL — LOW (ref 4.7–6.1)
RBC # FLD: 13.6 % — SIGNIFICANT CHANGE UP (ref 11.5–14.5)
SODIUM SERPL-SCNC: 132 MMOL/L — LOW (ref 135–146)
WBC # BLD: 12.44 K/UL — HIGH (ref 4.8–10.8)
WBC # FLD AUTO: 12.44 K/UL — HIGH (ref 4.8–10.8)

## 2025-01-16 PROCEDURE — 71045 X-RAY EXAM CHEST 1 VIEW: CPT | Mod: 26

## 2025-01-16 PROCEDURE — 99232 SBSQ HOSP IP/OBS MODERATE 35: CPT | Mod: 24

## 2025-01-16 PROCEDURE — 93010 ELECTROCARDIOGRAM REPORT: CPT

## 2025-01-16 PROCEDURE — 71045 X-RAY EXAM CHEST 1 VIEW: CPT | Mod: 26,77

## 2025-01-16 PROCEDURE — 99233 SBSQ HOSP IP/OBS HIGH 50: CPT

## 2025-01-16 RX ORDER — AMIODARONE HYDROCHLORIDE 50 MG/ML
0.5 INJECTION, SOLUTION INTRAVENOUS
Qty: 450 | Refills: 0 | Status: DISCONTINUED | OUTPATIENT
Start: 2025-01-16 | End: 2025-01-16

## 2025-01-16 RX ORDER — PROCAINAMIDE HCL 500 MG
1000 TABLET, EXTENDED RELEASE ORAL ONCE
Refills: 0 | Status: COMPLETED | OUTPATIENT
Start: 2025-01-16 | End: 2025-01-16

## 2025-01-16 RX ORDER — INSULIN LISPRO 100/ML
10 VIAL (ML) SUBCUTANEOUS
Refills: 0 | Status: DISCONTINUED | OUTPATIENT
Start: 2025-01-16 | End: 2025-01-19

## 2025-01-16 RX ORDER — IPRATROPIUM BROMIDE AND ALBUTEROL SULFATE .5; 2.5 MG/3ML; MG/3ML
3 SOLUTION RESPIRATORY (INHALATION) EVERY 6 HOURS
Refills: 0 | Status: DISCONTINUED | OUTPATIENT
Start: 2025-01-16 | End: 2025-01-19

## 2025-01-16 RX ORDER — DM/PSEUDOEPHED/ACETAMINOPHEN 10-30-250
15 CAPSULE ORAL ONCE
Refills: 0 | Status: DISCONTINUED | OUTPATIENT
Start: 2025-01-16 | End: 2025-01-19

## 2025-01-16 RX ORDER — SODIUM CHLORIDE 9 G/ML
1000 INJECTION, SOLUTION INTRAVENOUS
Refills: 0 | Status: DISCONTINUED | OUTPATIENT
Start: 2025-01-16 | End: 2025-01-19

## 2025-01-16 RX ORDER — AMIODARONE HYDROCHLORIDE 50 MG/ML
200 INJECTION, SOLUTION INTRAVENOUS EVERY 12 HOURS
Refills: 0 | Status: DISCONTINUED | OUTPATIENT
Start: 2025-01-16 | End: 2025-01-16

## 2025-01-16 RX ORDER — AMIODARONE HYDROCHLORIDE 50 MG/ML
1 INJECTION, SOLUTION INTRAVENOUS
Qty: 450 | Refills: 0 | Status: DISCONTINUED | OUTPATIENT
Start: 2025-01-16 | End: 2025-01-16

## 2025-01-16 RX ORDER — INSULIN LISPRO 100/ML
VIAL (ML) SUBCUTANEOUS
Refills: 0 | Status: DISCONTINUED | OUTPATIENT
Start: 2025-01-16 | End: 2025-01-19

## 2025-01-16 RX ORDER — GLUCAGON 3 MG/1
1 POWDER NASAL ONCE
Refills: 0 | Status: DISCONTINUED | OUTPATIENT
Start: 2025-01-16 | End: 2025-01-19

## 2025-01-16 RX ORDER — ACETAMINOPHEN 160 MG/5ML
1000 SUSPENSION ORAL ONCE
Refills: 0 | Status: COMPLETED | OUTPATIENT
Start: 2025-01-16 | End: 2025-01-16

## 2025-01-16 RX ORDER — INSULIN GLARGINE-YFGN 100 [IU]/ML
30 INJECTION, SOLUTION SUBCUTANEOUS EVERY MORNING
Refills: 0 | Status: DISCONTINUED | OUTPATIENT
Start: 2025-01-16 | End: 2025-01-19

## 2025-01-16 RX ORDER — AMIODARONE HYDROCHLORIDE 50 MG/ML
150 INJECTION, SOLUTION INTRAVENOUS ONCE
Refills: 0 | Status: COMPLETED | OUTPATIENT
Start: 2025-01-16 | End: 2025-01-16

## 2025-01-16 RX ADMIN — FAMOTIDINE 20 MILLIGRAM(S): 10 INJECTION INTRAVENOUS at 05:44

## 2025-01-16 RX ADMIN — Medication 260 MILLIGRAM(S): at 13:05

## 2025-01-16 RX ADMIN — Medication 25 MILLIGRAM(S): at 17:25

## 2025-01-16 RX ADMIN — Medication 2 TABLET(S): at 21:43

## 2025-01-16 RX ADMIN — Medication 10 UNIT(S): at 17:24

## 2025-01-16 RX ADMIN — OXYCODONE HYDROCHLORIDE 10 MILLIGRAM(S): 30 TABLET ORAL at 04:31

## 2025-01-16 RX ADMIN — Medication 5000 UNIT(S): at 05:44

## 2025-01-16 RX ADMIN — AMIODARONE HYDROCHLORIDE 33.3 MG/MIN: 50 INJECTION, SOLUTION INTRAVENOUS at 13:15

## 2025-01-16 RX ADMIN — AMIODARONE HYDROCHLORIDE 600 MILLIGRAM(S): 50 INJECTION, SOLUTION INTRAVENOUS at 12:25

## 2025-01-16 RX ADMIN — Medication 20 MILLIGRAM(S): at 06:41

## 2025-01-16 RX ADMIN — OXYCODONE HYDROCHLORIDE 10 MILLIGRAM(S): 30 TABLET ORAL at 18:00

## 2025-01-16 RX ADMIN — OXYCODONE HYDROCHLORIDE 10 MILLIGRAM(S): 30 TABLET ORAL at 10:00

## 2025-01-16 RX ADMIN — Medication 10 UNIT(S): at 11:24

## 2025-01-16 RX ADMIN — ANTISEPTIC SURGICAL SCRUB 1 APPLICATION(S): 0.04 SOLUTION TOPICAL at 05:45

## 2025-01-16 RX ADMIN — OXYCODONE HYDROCHLORIDE 10 MILLIGRAM(S): 30 TABLET ORAL at 17:22

## 2025-01-16 RX ADMIN — IPRATROPIUM BROMIDE AND ALBUTEROL SULFATE 3 MILLILITER(S): .5; 2.5 SOLUTION RESPIRATORY (INHALATION) at 14:49

## 2025-01-16 RX ADMIN — Medication 25 MILLIGRAM(S): at 05:44

## 2025-01-16 RX ADMIN — OXYCODONE HYDROCHLORIDE 10 MILLIGRAM(S): 30 TABLET ORAL at 11:00

## 2025-01-16 RX ADMIN — AMIODARONE HYDROCHLORIDE 33.3 MG/MIN: 50 INJECTION, SOLUTION INTRAVENOUS at 05:48

## 2025-01-16 RX ADMIN — INSULIN GLARGINE-YFGN 30 UNIT(S): 100 INJECTION, SOLUTION SUBCUTANEOUS at 09:34

## 2025-01-16 RX ADMIN — Medication 5000 UNIT(S): at 13:17

## 2025-01-16 RX ADMIN — AMIODARONE HYDROCHLORIDE 200 MILLIGRAM(S): 50 INJECTION, SOLUTION INTRAVENOUS at 09:42

## 2025-01-16 RX ADMIN — ACETAMINOPHEN 400 MILLIGRAM(S): 160 SUSPENSION ORAL at 06:41

## 2025-01-16 RX ADMIN — Medication 5000 UNIT(S): at 21:44

## 2025-01-16 RX ADMIN — ASPIRIN 81 MILLIGRAM(S): 81 TABLET, COATED ORAL at 11:24

## 2025-01-16 RX ADMIN — Medication 1: at 17:25

## 2025-01-16 RX ADMIN — Medication 100 MILLIGRAM(S): at 05:43

## 2025-01-16 RX ADMIN — ACETAMINOPHEN 1000 MILLIGRAM(S): 160 SUSPENSION ORAL at 07:41

## 2025-01-16 RX ADMIN — OXYCODONE HYDROCHLORIDE 10 MILLIGRAM(S): 30 TABLET ORAL at 03:31

## 2025-01-16 RX ADMIN — POLYETHYLENE GLYCOL 3350 17 GRAM(S): 17 POWDER, FOR SOLUTION ORAL at 11:24

## 2025-01-16 RX ADMIN — AMIODARONE HYDROCHLORIDE 16.7 MG/MIN: 50 INJECTION, SOLUTION INTRAVENOUS at 20:38

## 2025-01-16 RX ADMIN — FAMOTIDINE 20 MILLIGRAM(S): 10 INJECTION INTRAVENOUS at 17:24

## 2025-01-16 NOTE — CHART NOTE - NSCHARTNOTEFT_GEN_A_CORE
called by RN for patient seeing dots on the wall and blanket moving  no headache, dizziness, nausea, vomiting     seen and examined  VSS  AOx3, no distress  EOMI PERRLA  no visual field defect  no facial droop, muscle weakness or sensory deficit  NIHSS 0    spoke to neurology on call by phone who said that those are "positive symptoms (visual hallucinations)" and not "negative (deficit) symptoms" so code stroke not needed to be called,   I placed order for neurology and ophtalmology consults

## 2025-01-16 NOTE — H&P PST ADULT - ANESTHESIA, PREVIOUS REACTION, PROFILE
none Clofazimine Pregnancy And Lactation Text: This medication is Pregnancy Category C and isn't considered safe during pregnancy. It is excreted in breast milk. Finasteride Pregnancy And Lactation Text: This medication is absolutely contraindicated during pregnancy. It is unknown if it is excreted in breast milk. Sotyktu Counseling:  I discussed the most common side effects of Sotyktu including: common cold, sore throat, sinus infections, cold sores, canker sores, folliculitis, and acne.? I also discussed more serious side effects of Sotyktu including but not limited to: serious allergic reactions; increased risk for infections such as TB; cancers such as lymphomas; rhabdomyolysis and elevated CPK; and elevated triglycerides and liver enzymes.? Protopic Counseling: Patient may experience a mild burning sensation during topical application. Protopic is not approved in children less than 2 years of age. There have been case reports of hematologic and skin malignancies in patients using topical calcineurin inhibitors although causality is questionable. Doxycycline Pregnancy And Lactation Text: This medication is Pregnancy Category D and not consider safe during pregnancy. It is also excreted in breast milk but is considered safe for shorter treatment courses. Cyclophosphamide Counseling:  I discussed with the patient the risks of cyclophosphamide including but not limited to hair loss, hormonal abnormalities, decreased fertility, abdominal pain, diarrhea, nausea and vomiting, bone marrow suppression and infection. The patient understands that monitoring is required while taking this medication. Topical Steroids Applications Pregnancy And Lactation Text: Most topical steroids are considered safe to use during pregnancy and lactation.  Any topical steroid applied to the breast or nipple should be washed off before breastfeeding. Oral Minoxidil Counseling- I discussed with the patient the risks of oral minoxidil including but not limited to shortness of breath, swelling of the feet or ankles, dizziness, lightheadedness, unwanted hair growth and allergic reaction.  The patient verbalized understanding of the proper use and possible adverse effects of oral minoxidil.  All of the patient's questions and concerns were addressed. Propranolol Pregnancy And Lactation Text: This medication is Pregnancy Category C and it isn't known if it is safe during pregnancy. It is excreted in breast milk. Qbrexza Counseling:  I discussed with the patient the risks of Qbrexza including but not limited to headache, mydriasis, blurred vision, dry eyes, nasal dryness, dry mouth, dry throat, dry skin, urinary hesitation, and constipation.  Local skin reactions including erythema, burning, stinging, and itching can also occur. Humira Counseling:  I discussed with the patient the risks of adalimumab including but not limited to myelosuppression, immunosuppression, autoimmune hepatitis, demyelinating diseases, lymphoma, and serious infections.  The patient understands that monitoring is required including a PPD at baseline and must alert us or the primary physician if symptoms of infection or other concerning signs are noted. Siliq Counseling:  I discussed with the patient the risks of Siliq including but not limited to new or worsening depression, suicidal thoughts and behavior, immunosuppression, malignancy, posterior leukoencephalopathy syndrome, and serious infections.  The patient understands that monitoring is required including a PPD at baseline and must alert us or the primary physician if symptoms of infection or other concerning signs are noted. There is also a special program designed to monitor depression which is required with Siliq. Tazorac Counseling:  Patient advised that medication is irritating and drying.  Patient may need to apply sparingly and wash off after an hour before eventually leaving it on overnight.  The patient verbalized understanding of the proper use and possible adverse effects of tazorac.  All of the patient's questions and concerns were addressed. Doxepin Counseling:  Patient advised that the medication is sedating and not to drive a car after taking this medication. Patient informed of potential adverse effects including but not limited to dry mouth, urinary retention, and blurry vision.  The patient verbalized understanding of the proper use and possible adverse effects of doxepin.  All of the patient's questions and concerns were addressed. Use Enhanced Medication Counseling?: No Bimzelx Pregnancy And Lactation Text: This medication crosses the placenta and the safety is uncertain during pregnancy. It is unknown if this medication is present in breast milk. Stelara Pregnancy And Lactation Text: This medication is Pregnancy Category B and is considered safe during pregnancy. It is unknown if this medication is excreted in breast milk. Drysol Pregnancy And Lactation Text: This medication is considered safe during pregnancy and breast feeding. Libtayo Counseling- I discussed with the patient the risks of Libtayo including but not limited to nausea, vomiting, diarrhea, and bone or muscle pain.  The patient verbalized understanding of the proper use and possible adverse effects of Libtayo.  All of the patient's questions and concerns were addressed. Prednisone Pregnancy And Lactation Text: This medication is Pregnancy Category C and it isn't know if it is safe during pregnancy. This medication is excreted in breast milk. Doxepin Pregnancy And Lactation Text: This medication is Pregnancy Category C and it isn't known if it is safe during pregnancy. It is also excreted in breast milk and breast feeding isn't recommended. Itraconazole Counseling:  I discussed with the patient the risks of itraconazole including but not limited to liver damage, nausea/vomiting, neuropathy, and severe allergy.  The patient understands that this medication is best absorbed when taken with acidic beverages such as non-diet cola or ginger ale.  The patient understands that monitoring is required including baseline LFTs and repeat LFTs at intervals.  The patient understands that they are to contact us or the primary physician if concerning signs are noted. Taltz Counseling: I discussed with the patient the risks of ixekizumab including but not limited to immunosuppression, serious infections, worsening of inflammatory bowel disease and drug reactions.  The patient understands that monitoring is required including a PPD at baseline and must alert us or the primary physician if symptoms of infection or other concerning signs are noted. Topical Sulfur Applications Counseling: Topical Sulfur Counseling: Patient counseled that this medication may cause skin irritation or allergic reactions.  In the event of skin irritation, the patient was advised to reduce the amount of the drug applied or use it less frequently.   The patient verbalized understanding of the proper use and possible adverse effects of topical sulfur application.  All of the patient's questions and concerns were addressed. Isotretinoin Pregnancy And Lactation Text: This medication is Pregnancy Category X and is considered extremely dangerous during pregnancy. It is unknown if it is excreted in breast milk. Azithromycin Counseling:  I discussed with the patient the risks of azithromycin including but not limited to GI upset, allergic reaction, drug rash, diarrhea, and yeast infections. Zoryve Pregnancy And Lactation Text: It is unknown if this medication can cause problems during pregnancy and breastfeeding. Benzoyl Peroxide Pregnancy And Lactation Text: This medication is Pregnancy Category C. It is unknown if benzoyl peroxide is excreted in breast milk. Klisyri Pregnancy And Lactation Text: It is unknown if this medication can harm a developing fetus or if it is excreted in breast milk. Qbrexza Pregnancy And Lactation Text: There is no available data on Qbrexza use in pregnant women.  There is no available data on Qbrexza use in lactation. Rifampin Pregnancy And Lactation Text: This medication is Pregnancy Category C and it isn't know if it is safe during pregnancy. It is also excreted in breast milk and should not be used if you are breast feeding. Hydroxychloroquine Pregnancy And Lactation Text: This medication has been shown to cause fetal harm but it isn't assigned a Pregnancy Risk Category. There are small amounts excreted in breast milk. Cimzia Counseling:  I discussed with the patient the risks of Cimzia including but not limited to immunosuppression, allergic reactions and infections.  The patient understands that monitoring is required including a PPD at baseline and must alert us or the primary physician if symptoms of infection or other concerning signs are noted. Sotyktu Pregnancy And Lactation Text: There is insufficient data to evaluate whether or not Sotyktu is safe to use during pregnancy.? ?It is not known if Sotyktu passes into breast milk and whether or not it is safe to use when breastfeeding.?? SSKI Counseling:  I discussed with the patient the risks of SSKI including but not limited to thyroid abnormalities, metallic taste, GI upset, fever, headache, acne, arthralgias, paraesthesias, lymphadenopathy, easy bleeding, arrhythmias, and allergic reaction. Siliq Pregnancy And Lactation Text: The risk during pregnancy and breastfeeding is uncertain with this medication. High Dose Vitamin A Counseling: Side effects reviewed, pt to contact office should one occur. Carac Counseling:  I discussed with the patient the risks of Carac including but not limited to erythema, scaling, itching, weeping, crusting, and pain. Zyclara Counseling:  I discussed with the patient the risks of imiquimod including but not limited to erythema, scaling, itching, weeping, crusting, and pain.  Patient understands that the inflammatory response to imiquimod is variable from person to person and was educated regarded proper titration schedule.  If flu-like symptoms develop, patient knows to discontinue the medication and contact us. Azithromycin Pregnancy And Lactation Text: This medication is considered safe during pregnancy and is also secreted in breast milk. Libtayo Pregnancy And Lactation Text: This medication is contraindicated in pregnancy and when breast feeding. Birth Control Pills Counseling: Birth Control Pill Counseling: I discussed with the patient the potential side effects of OCPs including but not limited to increased risk of stroke, heart attack, thrombophlebitis, deep venous thrombosis, hepatic adenomas, breast changes, GI upset, headaches, and depression.  The patient verbalized understanding of the proper use and possible adverse effects of OCPs. All of the patient's questions and concerns were addressed. Colchicine Counseling:  Patient counseled regarding adverse effects including but not limited to stomach upset (nausea, vomiting, stomach pain, or diarrhea).  Patient instructed to limit alcohol consumption while taking this medication.  Colchicine may reduce blood counts especially with prolonged use.  The patient understands that monitoring of kidney function and blood counts may be required, especially at baseline. The patient verbalized understanding of the proper use and possible adverse effects of colchicine.  All of the patient's questions and concerns were addressed. Erythromycin Counseling:  I discussed with the patient the risks of erythromycin including but not limited to GI upset, allergic reaction, drug rash, diarrhea, increase in liver enzymes, and yeast infections. Elidel Counseling: Patient may experience a mild burning sensation during topical application. Elidel is not approved in children less than 2 years of age. There have been case reports of hematologic and skin malignancies in patients using topical calcineurin inhibitors although causality is questionable. Tazorac Pregnancy And Lactation Text: This medication is not safe during pregnancy. It is unknown if this medication is excreted in breast milk. Oral Minoxidil Pregnancy And Lactation Text: This medication should only be used when clearly needed if you are pregnant, attempting to become pregnant or breast feeding. Protopic Pregnancy And Lactation Text: This medication is Pregnancy Category C. It is unknown if this medication is excreted in breast milk when applied topically. Hyrimoz Counseling:  I discussed with the patient the risks of adalimumab including but not limited to myelosuppression, immunosuppression, autoimmune hepatitis, demyelinating diseases, lymphoma, and serious infections.  The patient understands that monitoring is required including a PPD at baseline and must alert us or the primary physician if symptoms of infection or other concerning signs are noted. Rhofade Counseling: Rhofade is a topical medication which can decrease superficial blood flow where applied. Side effects are uncommon and include stinging, redness and allergic reactions. Odomzo Counseling- I discussed with the patient the risks of Odomzo including but not limited to nausea, vomiting, diarrhea, constipation, weight loss, changes in the sense of taste, decreased appetite, muscle spasms, and hair loss.  The patient verbalized understanding of the proper use and possible adverse effects of Odomzo.  All of the patient's questions and concerns were addressed. Erythromycin Pregnancy And Lactation Text: This medication is Pregnancy Category B and is considered safe during pregnancy. It is also excreted in breast milk. Birth Control Pills Pregnancy And Lactation Text: This medication should be avoided if pregnant and for the first 30 days post-partum. Elidel Pregnancy And Lactation Text: This medication is Pregnancy Category C. It is unknown if this medication is excreted in breast milk. Low Dose Naltrexone Counseling- I discussed with the patient the potential risks and side effects of low dose naltrexone including but not limited to: more vivid dreams, headaches, nausea, vomiting, abdominal pain, fatigue, dizziness, and anxiety. Minoxidil Counseling: Minoxidil is a topical medication which can increase blood flow where it is applied. It is uncertain how this medication increases hair growth. Side effects are uncommon and include stinging and allergic reactions. Sarecycline Counseling: Patient advised regarding possible photosensitivity and discoloration of the teeth, skin, lips, tongue and gums.  Patient instructed to avoid sunlight, if possible.  When exposed to sunlight, patients should wear protective clothing, sunglasses, and sunscreen.  The patient was instructed to call the office immediately if the following severe adverse effects occur:  hearing changes, easy bruising/bleeding, severe headache, or vision changes.  The patient verbalized understanding of the proper use and possible adverse effects of sarecycline.  All of the patient's questions and concerns were addressed. Hydroxyzine Counseling: Patient advised that the medication is sedating and not to drive a car after taking this medication.  Patient informed of potential adverse effects including but not limited to dry mouth, urinary retention, and blurry vision.  The patient verbalized understanding of the proper use and possible adverse effects of hydroxyzine.  All of the patient's questions and concerns were addressed. Cibinqo Counseling: I discussed with the patient the risks of Cibinqo therapy including but not limited to common cold, nausea, headache, cold sores, increased blood CPK levels, dizziness, UTIs, fatigue, acne, and vomitting. Live vaccines should be avoided.  This medication has been linked to serious infections; higher rate of mortality; malignancy and lymphoproliferative disorders; major adverse cardiovascular events; thrombosis; thrombocytopenia and lymphopenia; lipid elevations; and retinal detachment. Itraconazole Pregnancy And Lactation Text: This medication is Pregnancy Category C and it isn't know if it is safe during pregnancy. It is also excreted in breast milk. Topical Sulfur Applications Pregnancy And Lactation Text: This medication is considered safe during pregnancy and breast feeding secondary to limited systemic absorption. Minoxidil Pregnancy And Lactation Text: This medication has not been assigned a Pregnancy Risk Category but animal studies failed to show danger with the topical medication. It is unknown if the medication is excreted in breast milk. Bactrim Counseling:  I discussed with the patient the risks of sulfa antibiotics including but not limited to GI upset, allergic reaction, drug rash, diarrhea, dizziness, photosensitivity, and yeast infections.  Rarely, more serious reactions can occur including but not limited to aplastic anemia, agranulocytosis, methemoglobinemia, blood dyscrasias, liver or kidney failure, lung infiltrates or desquamative/blistering drug rashes. Topical Clindamycin Counseling: Patient counseled that this medication may cause skin irritation or allergic reactions.  In the event of skin irritation, the patient was advised to reduce the amount of the drug applied or use it less frequently.   The patient verbalized understanding of the proper use and possible adverse effects of clindamycin.  All of the patient's questions and concerns were addressed. Ketoconazole Counseling:   Patient counseled regarding improving absorption with orange juice.  Adverse effects include but are not limited to breast enlargement, headache, diarrhea, nausea, upset stomach, liver function test abnormalities, taste disturbance, and stomach pain.  There is a rare possibility of liver failure that can occur when taking ketoconazole. The patient understands that monitoring of LFTs may be required, especially at baseline. The patient verbalized understanding of the proper use and possible adverse effects of ketoconazole.  All of the patient's questions and concerns were addressed. Low Dose Naltrexone Pregnancy And Lactation Text: Naltrexone is pregnancy category C.  There have been no adequate and well-controlled studies in pregnant women.  It should be used in pregnancy only if the potential benefit justifies the potential risk to the fetus.   Limited data indicates that naltrexone is minimally excreted into breastmilk. Sarecycline Pregnancy And Lactation Text: This medication is Pregnancy Category D and not consider safe during pregnancy. It is also excreted in breast milk. Cimzia Pregnancy And Lactation Text: This medication crosses the placenta but can be considered safe in certain situations. Cimzia may be excreted in breast milk. Cibinqo Pregnancy And Lactation Text: It is unknown if this medication will adversely affect pregnancy or breast feeding.  You should not take this medication if you are currently pregnant or planning a pregnancy or while breastfeeding. Rhofade Pregnancy And Lactation Text: This medication has not been assigned a Pregnancy Risk Category. It is unknown if the medication is excreted in breast milk. Otezla Counseling: The side effects of Otezla were discussed with the patient, including but not limited to worsening or new depression, weight loss, diarrhea, nausea, upper respiratory tract infection, and headache. Patient instructed to call the office should any adverse effect occur.  The patient verbalized understanding of the proper use and possible adverse effects of Otezla.  All the patient's questions and concerns were addressed. Xeljanz Counseling: I discussed with the patient the risks of Xeljanz therapy including increased risk of infection, liver issues, headache, diarrhea, or cold symptoms. Live vaccines should be avoided. They were instructed to call if they have any problems. Sski Pregnancy And Lactation Text: This medication is Pregnancy Category D and isn't considered safe during pregnancy. It is excreted in breast milk. Simponi Counseling:  I discussed with the patient the risks of golimumab including but not limited to myelosuppression, immunosuppression, autoimmune hepatitis, demyelinating diseases, lymphoma, and serious infections.  The patient understands that monitoring is required including a PPD at baseline and must alert us or the primary physician if symptoms of infection or other concerning signs are noted. High Dose Vitamin A Pregnancy And Lactation Text: High dose vitamin A therapy is contraindicated during pregnancy and breast feeding. Carac Pregnancy And Lactation Text: This medication is Pregnancy Category X and contraindicated in pregnancy and in women who may become pregnant. It is unknown if this medication is excreted in breast milk. Otezla Pregnancy And Lactation Text: This medication is Pregnancy Category C and it isn't known if it is safe during pregnancy. It is unknown if it is excreted in breast milk. Xelrakelz Pregnancy And Lactation Text: This medication is Pregnancy Category D and is not considered safe during pregnancy.  The risk during breast feeding is also uncertain. Thalidomide Counseling: I discussed with the patient the risks of thalidomide including but not limited to birth defects, anxiety, weakness, chest pain, dizziness, cough and severe allergy. Dapsone Counseling: I discussed with the patient the risks of dapsone including but not limited to hemolytic anemia, agranulocytosis, rashes, methemoglobinemia, kidney failure, peripheral neuropathy, headaches, GI upset, and liver toxicity.  Patients who start dapsone require monitoring including baseline LFTs and weekly CBCs for the first month, then every month thereafter.  The patient verbalized understanding of the proper use and possible adverse effects of dapsone.  All of the patient's questions and concerns were addressed. Topical Clindamycin Pregnancy And Lactation Text: This medication is Pregnancy Category B and is considered safe during pregnancy. It is unknown if it is excreted in breast milk. Hydroxyzine Pregnancy And Lactation Text: This medication is not safe during pregnancy and should not be taken. It is also excreted in breast milk and breast feeding isn't recommended. Tremfya Counseling: I discussed with the patient the risks of guselkumab including but not limited to immunosuppression, serious infections, and drug reactions.  The patient understands that monitoring is required including a PPD at baseline and must alert us or the primary physician if symptoms of infection or other concerning signs are noted. Wartpeel Counseling:  I discussed with the patient the risks of Wartpeel including but not limited to erythema, scaling, itching, weeping, crusting, and pain. Cosentyx Counseling:  I discussed with the patient the risks of Cosentyx including but not limited to worsening of Crohn's disease, immunosuppression, allergic reactions and infections.  The patient understands that monitoring is required including a PPD at baseline and must alert us or the primary physician if symptoms of infection or other concerning signs are noted. Eucrisa Counseling: Patient may experience a mild burning sensation during topical application. Eucrisa is not approved in children less than 2 years of age. Odomzo Pregnancy And Lactation Text: This medication is Pregnancy Category X and is absolutely contraindicated during pregnancy. It is unknown if it is excreted in breast milk. Metronidazole Counseling:  I discussed with the patient the risks of metronidazole including but not limited to seizures, nausea/vomiting, a metallic taste in the mouth, nausea/vomiting and severe allergy. Albendazole Pregnancy And Lactation Text: This medication is Pregnancy Category C and it isn't known if it is safe during pregnancy. It is also excreted in breast milk. Spironolactone Counseling: Patient advised regarding risks of diarrhea, abdominal pain, hyperkalemia, birth defects (for female patients), liver toxicity and renal toxicity. The patient may need blood work to monitor liver and kidney function and potassium levels while on therapy. The patient verbalized understanding of the proper use and possible adverse effects of spironolactone.  All of the patient's questions and concerns were addressed. Litfulo Counseling: I discussed with the patient the risks of Litfulo therapy including but not limited to upper respiratory tract infections, shingles, cold sores, and nausea. Live vaccines should be avoided.  This medication has been linked to serious infections; higher rate of mortality; malignancy and lymphoproliferative disorders; major adverse cardiovascular events; thrombosis; gastrointestinal perforations; neutropenia; lymphopenia; anemia; liver enzyme elevations; and lipid elevations. Ketoconazole Pregnancy And Lactation Text: This medication is Pregnancy Category C and it isn't know if it is safe during pregnancy. It is also excreted in breast milk and breast feeding isn't recommended. Niacinamide Counseling: I recommended taking niacin or niacinamide, also know as vitamin B3, twice daily. Recent evidence suggests that taking vitamin B3 (500 mg twice daily) can reduce the risk of actinic keratoses and non-melanoma skin cancers. Side effects of vitamin B3 include flushing and headache. Calcipotriene Counseling:  I discussed with the patient the risks of calcipotriene including but not limited to erythema, scaling, itching, and irritation. Bactrim Pregnancy And Lactation Text: This medication is Pregnancy Category D and is known to cause fetal risk.  It is also excreted in breast milk. Ilumya Counseling: I discussed with the patient the risks of tildrakizumab including but not limited to immunosuppression, malignancy, posterior leukoencephalopathy syndrome, and serious infections.  The patient understands that monitoring is required including a PPD at baseline and must alert us or the primary physician if symptoms of infection or other concerning signs are noted. Solaraze Counseling:  I discussed with the patient the risks of Solaraze including but not limited to erythema, scaling, itching, weeping, crusting, and pain. Tetracycline Counseling: Patient counseled regarding possible photosensitivity and increased risk for sunburn.  Patient instructed to avoid sunlight, if possible.  When exposed to sunlight, patients should wear protective clothing, sunglasses, and sunscreen.  The patient was instructed to call the office immediately if the following severe adverse effects occur:  hearing changes, easy bruising/bleeding, severe headache, or vision changes.  The patient verbalized understanding of the proper use and possible adverse effects of tetracycline.  All of the patient's questions and concerns were addressed. Patient understands to avoid pregnancy while on therapy due to potential birth defects. Mirvaso Counseling: Mirvaso is a topical medication which can decrease superficial blood flow where applied. Side effects are uncommon and include stinging, redness and allergic reactions. Cyclophosphamide Pregnancy And Lactation Text: This medication is Pregnancy Category D and it isn't considered safe during pregnancy. This medication is excreted in breast milk. Dapsone Pregnancy And Lactation Text: This medication is Pregnancy Category C and is not considered safe during pregnancy or breast feeding. Skyrizi Counseling: I discussed with the patient the risks of risankizumab-rzaa including but not limited to immunosuppression, and serious infections.  The patient understands that monitoring is required including a PPD at baseline and must alert us or the primary physician if symptoms of infection or other concerning signs are noted. Cephalexin Counseling: I counseled the patient regarding use of cephalexin as an antibiotic for prophylactic and/or therapeutic purposes. Cephalexin (commonly prescribed under brand name Keflex) is a cephalosporin antibiotic which is active against numerous classes of bacteria, including most skin bacteria. Side effects may include nausea, diarrhea, gastrointestinal upset, rash, hives, yeast infections, and in rare cases, hepatitis, kidney disease, seizures, fever, confusion, neurologic symptoms, and others. Patients with severe allergies to penicillin medications are cautioned that there is about a 10% incidence of cross-reactivity with cephalosporins. When possible, patients with penicillin allergies should use alternatives to cephalosporins for antibiotic therapy. Oxybutynin Counseling:  I discussed with the patient the risks of oxybutynin including but not limited to skin rash, drowsiness, dry mouth, difficulty urinating, and blurred vision. Albendazole Counseling:  I discussed with the patient the risks of albendazole including but not limited to cytopenia, kidney damage, nausea/vomiting and severe allergy.  The patient understands that this medication is being used in an off-label manner. Ivermectin Counseling:  Patient instructed to take medication on an empty stomach with a full glass of water.  Patient informed of potential adverse effects including but not limited to nausea, diarrhea, dizziness, itching, and swelling of the extremities or lymph nodes.  The patient verbalized understanding of the proper use and possible adverse effects of ivermectin.  All of the patient's questions and concerns were addressed. Dutasteride Male Counseling: Dustasteride Counseling:  I discussed with the patient the risks of use of dutasteride including but not limited to decreased libido, decreased ejaculate volume, and gynecomastia. Women who can become pregnant should not handle medication.  All of the patient's questions and concerns were addressed. Calcipotriene Pregnancy And Lactation Text: The use of this medication during pregnancy or lactation is not recommended as there is insufficient data. Topical Ketoconazole Counseling: Patient counseled that this medication may cause skin irritation or allergic reactions.  In the event of skin irritation, the patient was advised to reduce the amount of the drug applied or use it less frequently.   The patient verbalized understanding of the proper use and possible adverse effects of ketoconazole.  All of the patient's questions and concerns were addressed. Spironolactone Pregnancy And Lactation Text: This medication can cause feminization of the male fetus and should be avoided during pregnancy. The active metabolite is also found in breast milk. Metronidazole Pregnancy And Lactation Text: This medication is Pregnancy Category B and considered safe during pregnancy.  It is also excreted in breast milk. Opioid Counseling: I discussed with the patient the potential side effects of opioids including but not limited to addiction, altered mental status, and depression. I stressed avoiding alcohol, benzodiazepines, muscle relaxants and sleep aids unless specifically okayed by a physician. The patient verbalized understanding of the proper use and possible adverse effects of opioids. All of the patient's questions and concerns were addressed. They were instructed to flush the remaining pills down the toilet if they did not need them for pain. Xolair Counseling:  Patient informed of potential adverse effects including but not limited to fever, muscle aches, rash and allergic reactions.  The patient verbalized understanding of the proper use and possible adverse effects of Xolair.  All of the patient's questions and concerns were addressed. Hydroquinone Counseling:  Patient advised that medication may result in skin irritation, lightening (hypopigmentation), dryness, and burning.  In the event of skin irritation, the patient was advised to reduce the amount of the drug applied or use it less frequently.  Rarely, spots that are treated with hydroquinone can become darker (pseudoochronosis).  Should this occur, patient instructed to stop medication and call the office. The patient verbalized understanding of the proper use and possible adverse effects of hydroquinone.  All of the patient's questions and concerns were addressed. Terbinafine Counseling: Patient counseling regarding adverse effects of terbinafine including but not limited to headache, diarrhea, rash, upset stomach, liver function test abnormalities, itching, taste/smell disturbance, nausea, abdominal pain, and flatulence.  There is a rare possibility of liver failure that can occur when taking terbinafine.  The patient understands that a baseline LFT and kidney function test may be required. The patient verbalized understanding of the proper use and possible adverse effects of terbinafine.  All of the patient's questions and concerns were addressed. Minocycline Counseling: Patient advised regarding possible photosensitivity and discoloration of the teeth, skin, lips, tongue and gums.  Patient instructed to avoid sunlight, if possible.  When exposed to sunlight, patients should wear protective clothing, sunglasses, and sunscreen.  The patient was instructed to call the office immediately if the following severe adverse effects occur:  hearing changes, easy bruising/bleeding, severe headache, or vision changes.  The patient verbalized understanding of the proper use and possible adverse effects of minocycline.  All of the patient's questions and concerns were addressed. Winlevi Counseling:  I discussed with the patient the risks of topical clascoterone including but not limited to erythema, scaling, itching, and stinging. Patient voiced their understanding. Cyclosporine Counseling:  I discussed with the patient the risks of cyclosporine including but not limited to hypertension, gingival hyperplasia,myelosuppression, immunosuppression, liver damage, kidney damage, neurotoxicity, lymphoma, and serious infections. The patient understands that monitoring is required including baseline blood pressure, CBC, CMP, lipid panel and uric acid, and then 1-2 times monthly CMP and blood pressure. Litfulo Pregnancy And Lactation Text: Based on animal studies, Lifulo may cause embryo-fetal harm when administered to pregnant women.  The medication should not be used in pregnancy.  Breastfeeding is not recommended during treatment. Acitretin Counseling:  I discussed with the patient the risks of acitretin including but not limited to hair loss, dry lips/skin/eyes, liver damage, hyperlipidemia, depression/suicidal ideation, photosensitivity.  Serious rare side effects can include but are not limited to pancreatitis, pseudotumor cerebri, bony changes, clot formation/stroke/heart attack.  Patient understands that alcohol is contraindicated since it can result in liver toxicity and significantly prolong the elimination of the drug by many years. Solaraze Pregnancy And Lactation Text: This medication is Pregnancy Category B and is considered safe. There is some data to suggest avoiding during the third trimester. It is unknown if this medication is excreted in breast milk. Aklief counseling:  Patient advised to apply a pea-sized amount only at bedtime and wait 30 minutes after washing their face before applying.  If too drying, patient may add a non-comedogenic moisturizer.  The most commonly reported side effects including irritation, redness, scaling, dryness, stinging, burning, itching, and increased risk of sunburn.  The patient verbalized understanding of the proper use and possible adverse effects of retinoids.  All of the patient's questions and concerns were addressed. Niacinamide Pregnancy And Lactation Text: These medications are considered safe during pregnancy. Dutasteride Female Counseling: Dutasteride Counseling:  I discussed with the patient the risks of use of dutasteride including but not limited to decreased libido and sexual dysfunction. Explained the teratogenic nature of the medication and stressed the importance of not getting pregnant during treatment. All of the patient's questions and concerns were addressed. Opzelura Counseling:  I discussed with the patient the risks of Opzelura including but not limited to nasopharngitis, bronchitis, ear infection, eosinophila, hives, diarrhea, folliculitis, tonsillitis, and rhinorrhea.  Taken orally, this medication has been linked to serious infections; higher rate of mortality; malignancy and lymphoproliferative disorders; major adverse cardiovascular events; thrombosis; thrombocytopenia, anemia, and neutropenia; and lipid elevations. Cephalexin Pregnancy And Lactation Text: This medication is Pregnancy Category B and considered safe during pregnancy.  It is also excreted in breast milk but can be used safely for shorter doses. Azathioprine Counseling:  I discussed with the patient the risks of azathioprine including but not limited to myelosuppression, immunosuppression, hepatotoxicity, lymphoma, and infections.  The patient understands that monitoring is required including baseline LFTs, Creatinine, possible TPMP genotyping and weekly CBCs for the first month and then every 2 weeks thereafter.  The patient verbalized understanding of the proper use and possible adverse effects of azathioprine.  All of the patient's questions and concerns were addressed. Olumiant Counseling: I discussed with the patient the risks of Olumiant therapy including but not limited to upper respiratory tract infections, shingles, cold sores, and nausea. Live vaccines should be avoided.  This medication has been linked to serious infections; higher rate of mortality; malignancy and lymphoproliferative disorders; major adverse cardiovascular events; thrombosis; gastrointestinal perforations; neutropenia; lymphopenia; anemia; liver enzyme elevations; and lipid elevations. Dupixent Counseling: I discussed with the patient the risks of dupilumab including but not limited to eye infection and irritation, cold sores, injection site reactions, worsening of asthma, allergic reactions and increased risk of parasitic infection.  Live vaccines should be avoided while taking dupilumab. Dupilumab will also interact with certain medications such as warfarin and cyclosporine. The patient understands that monitoring is required and they must alert us or the primary physician if symptoms of infection or other concerning signs are noted. Infliximab Counseling:  I discussed with the patient the risks of infliximab including but not limited to myelosuppression, immunosuppression, autoimmune hepatitis, demyelinating diseases, lymphoma, and serious infections.  The patient understands that monitoring is required including a PPD at baseline and must alert us or the primary physician if symptoms of infection or other concerning signs are noted. Opioid Pregnancy And Lactation Text: These medications can lead to premature delivery and should be avoided during pregnancy. These medications are also present in breast milk in small amounts. Gabapentin Counseling: I discussed with the patient the risks of gabapentin including but not limited to dizziness, somnolence, fatigue and ataxia. Tranexamic Acid Counseling:  Patient advised of the small risk of bleeding problems with tranexamic acid. They were also instructed to call if they developed any nausea, vomiting or diarrhea. All of the patient's questions and concerns were addressed. Cantharidin Counseling:  I discussed with the patient the risks of Cantharidin including but not limited to pain, redness, burning, itching, and blistering. Winlevi Pregnancy And Lactation Text: This medication is considered safe during pregnancy and breastfeeding. Soolantra Counseling: I discussed with the patients the risks of topial Soolantra. This is a medicine which decreases the number of mites and inflammation in the skin. You experience burning, stinging, eye irritation or allergic reactions.  Please call our office if you develop any problems from using this medication. Fluconazole Counseling:  Patient counseled regarding adverse effects of fluconazole including but not limited to headache, diarrhea, nausea, upset stomach, liver function test abnormalities, taste disturbance, and stomach pain.  There is a rare possibility of liver failure that can occur when taking fluconazole.  The patient understands that monitoring of LFTs and kidney function test may be required, especially at baseline. The patient verbalized understanding of the proper use and possible adverse effects of fluconazole.  All of the patient's questions and concerns were addressed. Spevigo Counseling: I discussed with the patient the risks of Spevigo including but not limited to fatigue, nasuea, vomiting, headache, pruritus, urinary tract infection, an infusion related reactions.  The patient understands that monitoring is required including screening for tuberculosis at baseline and yearly screening thereafter while continuing Spevigo therapy. They should contact us if symptoms of infection or other concerning signs are noted. Acitretin Pregnancy And Lactation Text: This medication is Pregnancy Category X and should not be given to women who are pregnant or may become pregnant in the future. This medication is excreted in breast milk. Detail Level: Simple Nsaids Counseling: NSAID Counseling: I discussed with the patient that NSAIDs should be taken with food. Prolonged use of NSAIDs can result in the development of stomach ulcers.  Patient advised to stop taking NSAIDs if abdominal pain occurs.  The patient verbalized understanding of the proper use and possible adverse effects of NSAIDs.  All of the patient's questions and concerns were addressed. Xolair Pregnancy And Lactation Text: This medication is Pregnancy Category B and is considered safe during pregnancy. This medication is excreted in breast milk. Aklief Pregnancy And Lactation Text: It is unknown if this medication is safe to use during pregnancy.  It is unknown if this medication is excreted in breast milk.  Breastfeeding women should use the topical cream on the smallest area of the skin for the shortest time needed while breastfeeding.  Do not apply to nipple and areola. Dupixent Pregnancy And Lactation Text: This medication likely crosses the placenta but the risk for the fetus is uncertain. This medication is excreted in breast milk. Cantharidin Pregnancy And Lactation Text: This medication has not been proven safe during pregnancy. It is unknown if this medication is excreted in breast milk. Terbinafine Pregnancy And Lactation Text: This medication is Pregnancy Category B and is considered safe during pregnancy. It is also excreted in breast milk and breast feeding isn't recommended. Arava Counseling:  Patient counseled regarding adverse effects of Arava including but not limited to nausea, vomiting, abnormalities in liver function tests. Patients may develop mouth sores, rash, diarrhea, and abnormalities in blood counts. The patient understands that monitoring is required including LFTs and blood counts.  There is a rare possibility of scarring of the liver and lung problems that can occur when taking methotrexate. Persistent nausea, loss of appetite, pale stools, dark urine, cough, and shortness of breath should be reported immediately. Patient advised to discontinue Arava treatment and consult with a physician prior to attempting conception. The patient will have to undergo a treatment to eliminate Arava from the body prior to conception. Olumiant Pregnancy And Lactation Text: Based on animal studies, Olumiant may cause embryo-fetal harm when administered to pregnant women.  The medication should not be used in pregnancy.  Breastfeeding is not recommended during treatment. Opzelura Pregnancy And Lactation Text: There is insufficient data to evaluate drug-associated risk for major birth defects, miscarriage, or other adverse maternal or fetal outcomes.  There is a pregnancy registry that monitors pregnancy outcomes in pregnant persons exposed to the medication during pregnancy.  It is unknown if this medication is excreted in breast milk.  Do not breastfeed during treatment and for about 4 weeks after the last dose. Adbry Counseling: I discussed with the patient the risks of tralokinumab including but not limited to eye infection and irritation, cold sores, injection site reactions, worsening of asthma, allergic reactions and increased risk of parasitic infection.  Live vaccines should be avoided while taking tralokinumab. The patient understands that monitoring is required and they must alert us or the primary physician if symptoms of infection or other concerning signs are noted. Bexarotene Counseling:  I discussed with the patient the risks of bexarotene including but not limited to hair loss, dry lips/skin/eyes, liver abnormalities, hyperlipidemia, pancreatitis, depression/suicidal ideation, photosensitivity, drug rash/allergic reactions, hypothyroidism, anemia, leukopenia, infection, cataracts, and teratogenicity.  Patient understands that they will need regular blood tests to check lipid profile, liver function tests, white blood cell count, thyroid function tests and pregnancy test if applicable. Topical Metronidazole Counseling: Metronidazole is a topical antibiotic medication. You may experience burning, stinging, redness, or allergic reactions.  Please call our office if you develop any problems from using this medication. Nsaids Pregnancy And Lactation Text: These medications are considered safe up to 30 weeks gestation. It is excreted in breast milk. Azelaic Acid Counseling: Patient counseled that medicine may cause skin irritation and to avoid applying near the eyes.  In the event of skin irritation, the patient was advised to reduce the amount of the drug applied or use it less frequently.   The patient verbalized understanding of the proper use and possible adverse effects of azelaic acid.  All of the patient's questions and concerns were addressed. Soolantra Pregnancy And Lactation Text: This medication is Pregnancy Category C. This medication is considered safe during breast feeding. 5-Fu Counseling: 5-Fluorouracil Counseling:  I discussed with the patient the risks of 5-fluorouracil including but not limited to erythema, scaling, itching, weeping, crusting, and pain. Propranolol Counseling:  I discussed with the patient the risks of propranolol including but not limited to low heart rate, low blood pressure, low blood sugar, restlessness and increased cold sensitivity. They should call the office if they experience any of these side effects. Dutasteride Pregnancy And Lactation Text: This medication is absolutely contraindicated in women, especially during pregnancy and breast feeding. Feminization of male fetuses is possible if taking while pregnant. Tranexamic Acid Pregnancy And Lactation Text: It is unknown if this medication is safe during pregnancy or breast feeding. Clindamycin Counseling: I counseled the patient regarding use of clindamycin as an antibiotic for prophylactic and/or therapeutic purposes. Clindamycin is active against numerous classes of bacteria, including skin bacteria. Side effects may include nausea, diarrhea, gastrointestinal upset, rash, hives, yeast infections, and in rare cases, colitis. Azathioprine Pregnancy And Lactation Text: This medication is Pregnancy Category D and isn't considered safe during pregnancy. It is unknown if this medication is excreted in breast milk. Methotrexate Counseling:  Patient counseled regarding adverse effects of methotrexate including but not limited to nausea, vomiting, abnormalities in liver function tests. Patients may develop mouth sores, rash, diarrhea, and abnormalities in blood counts. The patient understands that monitoring is required including LFT's and blood counts.  There is a rare possibility of scarring of the liver and lung problems that can occur when taking methotrexate. Persistent nausea, loss of appetite, pale stools, dark urine, cough, and shortness of breath should be reported immediately. Patient advised to discontinue methotrexate treatment at least three months before attempting to become pregnant.  I discussed the need for folate supplements while taking methotrexate.  These supplements can decrease side effects during methotrexate treatment. The patient verbalized understanding of the proper use and possible adverse effects of methotrexate.  All of the patient's questions and concerns were addressed. Spevigo Pregnancy And Lactation Text: The risk during pregnancy and breastfeeding is uncertain with this medication. This medication does cross the placenta. It is unknown if this medication is found in breast milk. Quinolones Counseling:  I discussed with the patient the risks of fluoroquinolones including but not limited to GI upset, allergic reaction, drug rash, diarrhea, dizziness, photosensitivity, yeast infections, liver function test abnormalities, tendonitis/tendon rupture. Enbrel Counseling:  I discussed with the patient the risks of etanercept including but not limited to myelosuppression, immunosuppression, autoimmune hepatitis, demyelinating diseases, lymphoma, and infections.  The patient understands that monitoring is required including a PPD at baseline and must alert us or the primary physician if symptoms of infection or other concerning signs are noted. Erivedge Counseling- I discussed with the patient the risks of Erivedge including but not limited to nausea, vomiting, diarrhea, constipation, weight loss, changes in the sense of taste, decreased appetite, muscle spasms, and hair loss.  The patient verbalized understanding of the proper use and possible adverse effects of Erivedge.  All of the patient's questions and concerns were addressed. Valtrex Counseling: I discussed with the patient the risks of valacyclovir including but not limited to kidney damage, nausea, vomiting and severe allergy.  The patient understands that if the infection seems to be worsening or is not improving, they are to call. Finasteride Male Counseling: Finasteride Counseling:  I discussed with the patient the risks of use of finasteride including but not limited to decreased libido, decreased ejaculate volume, gynecomastia, and depression. Women should not handle medication.  All of the patient's questions and concerns were addressed. Cimetidine Counseling:  I discussed with the patient the risks of Cimetidine including but not limited to gynecomastia, headache, diarrhea, nausea, drowsiness, arrhythmias, pancreatitis, skin rashes, psychosis, bone marrow suppression and kidney toxicity. Cellcept Counseling:  I discussed with the patient the risks of mycophenolate mofetil including but not limited to infection/immunosuppression, GI upset, hypokalemia, hypercholesterolemia, bone marrow suppression, lymphoproliferative disorders, malignancy, GI ulceration/bleed/perforation, colitis, interstitial lung disease, kidney failure, progressive multifocal leukoencephalopathy, and birth defects.  The patient understands that monitoring is required including a baseline creatinine and regular CBC testing. In addition, patient must alert us immediately if symptoms of infection or other concerning signs are noted. Topical Metronidazole Pregnancy And Lactation Text: This medication is Pregnancy Category B and considered safe during pregnancy.  It is also considered safe to use while breastfeeding. VTAMA Counseling: I discussed with the patient that VTAMA is not for use in the eyes, mouth or mouth. They should call the office if they develop any signs of allergic reactions to VTAMA. The patient verbalized understanding of the proper use and possible adverse effects of VTAMA.  All of the patient's questions and concerns were addressed. Imiquimod Counseling:  I discussed with the patient the risks of imiquimod including but not limited to erythema, scaling, itching, weeping, crusting, and pain.  Patient understands that the inflammatory response to imiquimod is variable from person to person and was educated regarded proper titration schedule.  If flu-like symptoms develop, patient knows to discontinue the medication and contact us. Glycopyrrolate Counseling:  I discussed with the patient the risks of glycopyrrolate including but not limited to skin rash, drowsiness, dry mouth, difficulty urinating, and blurred vision. Rituxan Counseling:  I discussed with the patient the risks of Rituxan infusions. Side effects can include infusion reactions, severe drug rashes including mucocutaneous reactions, reactivation of latent hepatitis and other infections and rarely progressive multifocal leukoencephalopathy.  All of the patient's questions and concerns were addressed. Adbry Pregnancy And Lactation Text: It is unknown if this medication will adversely affect pregnancy or breast feeding. Methotrexate Pregnancy And Lactation Text: This medication is Pregnancy Category X and is known to cause fetal harm. This medication is excreted in breast milk. Rinvoq Counseling: I discussed with the patient the risks of Rinvoq therapy including but not limited to upper respiratory tract infections, shingles, cold sores, bronchitis, nausea, cough, fever, acne, and headache. Live vaccines should be avoided.  This medication has been linked to serious infections; higher rate of mortality; malignancy and lymphoproliferative disorders; major adverse cardiovascular events; thrombosis; thrombocytopenia, anemia, and neutropenia; lipid elevations; liver enzyme elevations; and gastrointestinal perforations. Clindamycin Pregnancy And Lactation Text: This medication can be used in pregnancy if certain situations. Clindamycin is also present in breast milk. Picato Counseling:  I discussed with the patient the risks of Picato including but not limited to erythema, scaling, itching, weeping, crusting, and pain. Bexarotene Pregnancy And Lactation Text: This medication is Pregnancy Category X and should not be given to women who are pregnant or may become pregnant. This medication should not be used if you are breast feeding. Topical Retinoid counseling:  Patient advised to apply a pea-sized amount only at bedtime and wait 30 minutes after washing their face before applying.  If too drying, patient may add a non-comedogenic moisturizer. The patient verbalized understanding of the proper use and possible adverse effects of retinoids.  All of the patient's questions and concerns were addressed. Olanzapine Counseling- I discussed with the patient the common side effects of olanzapine including but are not limited to: lack of energy, dry mouth, increased appetite, sleepiness, tremor, constipation, dizziness, changes in behavior, or restlessness.  Explained that teenagers are more likely to experience headaches, abdominal pain, pain in the arms or legs, tiredness, and sleepiness.  Serious side effects include but are not limited: increased risk of death in elderly patients who are confused, have memory loss, or dementia-related psychosis; hyperglycemia; increased cholesterol and triglycerides; and weight gain. Valtrex Pregnancy And Lactation Text: this medication is Pregnancy Category B and is considered safe during pregnancy. This medication is not directly found in breast milk but it's metabolite acyclovir is present. Prednisone Counseling:  I discussed with the patient the risks of prolonged use of prednisone including but not limited to weight gain, insomnia, osteoporosis, mood changes, diabetes, susceptibility to infection, glaucoma and high blood pressure.  In cases where prednisone use is prolonged, patients should be monitored with blood pressure checks, serum glucose levels and an eye exam.  Additionally, the patient may need to be placed on GI prophylaxis, PCP prophylaxis, and calcium and vitamin D supplementation and/or a bisphosphonate.  The patient verbalized understanding of the proper use and the possible adverse effects of prednisone.  All of the patient's questions and concerns were addressed. Doxycycline Counseling:  Patient counseled regarding possible photosensitivity and increased risk for sunburn.  Patient instructed to avoid sunlight, if possible.  When exposed to sunlight, patients should wear protective clothing, sunglasses, and sunscreen.  The patient was instructed to call the office immediately if the following severe adverse effects occur:  hearing changes, easy bruising/bleeding, severe headache, or vision changes.  The patient verbalized understanding of the proper use and possible adverse effects of doxycycline.  All of the patient's questions and concerns were addressed. Rinvoq Pregnancy And Lactation Text: Based on animal studies, Rinvoq may cause embryo-fetal harm when administered to pregnant women.  The medication should not be used in pregnancy.  Breastfeeding is not recommended during treatment and for 6 days after the last dose. Clofazimine Counseling:  I discussed with the patient the risks of clofazimine including but not limited to skin and eye pigmentation, liver damage, nausea/vomiting, gastrointestinal bleeding and allergy. Isotretinoin Counseling: Patient should get monthly blood tests, not donate blood, not drive at night if vision affected, not share medication, and not undergo elective surgery for 6 months after tx completed. Side effects reviewed, pt to contact office should one occur. Drysol Counseling:  I discussed with the patient the risks of drysol/aluminum chloride including but not limited to skin rash, itching, irritation, burning. Rituxan Pregnancy And Lactation Text: This medication is Pregnancy Category C and it isn't know if it is safe during pregnancy. It is unknown if this medication is excreted in breast milk but similar antibodies are known to be excreted. Glycopyrrolate Pregnancy And Lactation Text: This medication is Pregnancy Category B and is considered safe during pregnancy. It is unknown if it is excreted breast milk. Stelara Counseling:  I discussed with the patient the risks of ustekinumab including but not limited to immunosuppression, malignancy, posterior leukoencephalopathy syndrome, and serious infections.  The patient understands that monitoring is required including a PPD at baseline and must alert us or the primary physician if symptoms of infection or other concerning signs are noted. Bimzelx Counseling:  I discussed with the patient the risks of Bimzelx including but not limited to depression, immunosuppression, allergic reactions and infections.  The patient understands that monitoring is required including a PPD at baseline and must alert us or the primary physician if symptoms of infection or other concerning signs are noted. Topical Steroids Counseling: I discussed with the patient that prolonged use of topical steroids can result in the increased appearance of superficial blood vessels (telangiectasias), lightening (hypopigmentation) and thinning of the skin (atrophy).  Patient understands to avoid using high potency steroids in skin folds, the groin or the face.  The patient verbalized understanding of the proper use and possible adverse effects of topical steroids.  All of the patient's questions and concerns were addressed. Griseofulvin Counseling:  I discussed with the patient the risks of griseofulvin including but not limited to photosensitivity, cytopenia, liver damage, nausea/vomiting and severe allergy.  The patient understands that this medication is best absorbed when taken with a fatty meal (e.g., ice cream or french fries). Finasteride Female Counseling: Finasteride Counseling:  I discussed with the patient the risks of use of finasteride including but not limited to decreased libido and sexual dysfunction. Explained the teratogenic nature of the medication and stressed the importance of not getting pregnant during treatment. All of the patient's questions and concerns were addressed. Zoryve Counseling:  I discussed with the patient that Zoryve is not for use in the eyes, mouth or vagina. The most commonly reported side effects include diarrhea, headache, insomnia, application site pain, upper respiratory tract infections, and urinary tract infections.  All of the patient's questions and concerns were addressed. Klisyri Counseling:  I discussed with the patient the risks of Klisyri including but not limited to erythema, scaling, itching, weeping, crusting, and pain. Hydroxychloroquine Counseling:  I discussed with the patient that a baseline ophthalmologic exam is needed at the start of therapy and every year thereafter while on therapy. A CBC may also be warranted for monitoring.  The side effects of this medication were discussed with the patient, including but not limited to agranulocytosis, aplastic anemia, seizures, rashes, retinopathy, and liver toxicity. Patient instructed to call the office should any adverse effect occur.  The patient verbalized understanding of the proper use and possible adverse effects of Plaquenil.  All the patient's questions and concerns were addressed. Rifampin Counseling: I discussed with the patient the risks of rifampin including but not limited to liver damage, kidney damage, red-orange body fluids, nausea/vomiting and severe allergy. Griseofulvin Pregnancy And Lactation Text: This medication is Pregnancy Category X and is known to cause serious birth defects. It is unknown if this medication is excreted in breast milk but breast feeding should be avoided. Olanzapine Pregnancy And Lactation Text: This medication is pregnancy category C.   There are no adequate and well controlled trials with olanzapine in pregnant females.  Olanzapine should be used during pregnancy only if the potential benefit justifies the potential risk to the fetus.   In a study in lactating healthy women, olanzapine was excreted in breast milk.  It is recommended that women taking olanzapine should not breast feed. Benzoyl Peroxide Counseling: Patient counseled that medicine may cause skin irritation and bleach clothing.  In the event of skin irritation, the patient was advised to reduce the amount of the drug applied or use it less frequently.   The patient verbalized understanding of the proper use and possible adverse effects of benzoyl peroxide.  All of the patient's questions and concerns were addressed. Semaglutide Pregnancy And Lactation Text: The fetal risk of this medication is unknown and taking while pregnant is not recommended. It is unknown if this medication is present in breast milk. Wegovy Counseling: I reviewed the possible side effects including: thyroid tumors, kidney disease, gallbladder disease, abdominal pain, constipation, diarrhea, nausea, vomiting and pancreatitis. Do not take this medication if you have a history or family history of multiple endocrine neoplasia syndrome type 2. Side effects reviewed, pt to contact office should one occur. Ebglyss Counseling: I discussed with the patient the risks of lebrikizumab including but not limited to eye inflammation and irritation, cold sores, injection site reactions, allergic reactions and increased risk of parasitic infection. The patient understands that monitoring is required and they must alert us or the primary physician if symptoms of infection or other concerning signs are noted. Zepbound Counseling: I reviewed the possible side effects including: thyroid tumors, kidney disease, gallbladder disease, abdominal pain, constipation, diarrhea, nausea, vomiting and pancreatitis. Do not take this medication if you have a history or family history of multiple endocrine neoplasia syndrome type 2. Side effects reviewed, pt to contact office should one occur. Nemluvio Counseling: I discussed with the patient the risks of nemolizumab including but not limited to headache, gastrointestinal complaints, nasopharyngitis, musculoskeletal complaints, injection site reactions, and allergic reactions. The patient understands that monitoring is required and they must alert us or the primary physician if any side effects are noted. Nemluvio Pregnancy And Lactation Text: It is not known if Nemluvio causes fetal harm or is present in breast milk. Please proceed with caution if patients who are pregnant or breastfeeding. Ebglyss Pregnancy And Lactation Text: This medication likely crosses the placenta but the risk for the fetus is uncertain. It is unknown if this medication is excreted in breast milk. Ozempic Counseling: I reviewed the possible side effects including: thyroid tumors, kidney disease, gallbladder disease, abdominal pain, constipation, diarrhea, nausea, vomiting and pancreatitis. Do not take this medication if you have a history or family history of multiple endocrine neoplasia syndrome type 2. Side effects reviewed, pt to contact office should one occur. Saxenda Counseling: I reviewed the possible side effects including: thyroid tumors, kidney disease, gallbladder disease, abdominal pain, constipation, diarrhea, nausea, vomiting and pancreatitis. Do not take this medication if you have a history or family history of multiple endocrine neoplasia syndrome type 2. Side effects reviewed, pt to contact office should one occur. Semaglutide Counseling: I reviewed the possible side effects including: thyroid tumors, kidney disease, gallbladder disease, abdominal pain, constipation, diarrhea, nausea, vomiting and pancreatitis. Do not take this medication if you have a history or family history of multiple endocrine neoplasia syndrome type 2. Side effects reviewed, pt to contact office should one occur. Simlandi Counseling:  I discussed with the patient the risks of adalimumab including but not limited to myelosuppression, immunosuppression, autoimmune hepatitis, demyelinating diseases, lymphoma, and serious infections.  The patient understands that monitoring is required including a PPD at baseline and must alert us or the primary physician if symptoms of infection or other concerning signs are noted.

## 2025-01-16 NOTE — PROGRESS NOTE ADULT - SUBJECTIVE AND OBJECTIVE BOX
OPERATIVE PROCEDURE(s):                POD #                       SURGEON(s): ERIC Montemayor    HPI:      SUBJECTIVE ASSESSMENT:59yMale patient seen and examined at bedside.    Vital Signs Last 24 Hrs  T(F): 98.3 (16 Jan 2025 08:00), Max: 99.1 (15 [place] 2025 16:00)  HR: 75 (16 Jan 2025 08:00) (71 - 91)  BP: --  BP(mean): --  ABP: 137/71 (16 Jan 2025 08:00) (97/49 - 137/71)  ABP(mean): 93 (16 Jan 2025 08:00)  RR: 19 (16 Jan 2025 08:00) (0 - 26)  SpO2: 95% (16 Jan 2025 08:00) (91% - 98%)  CVP(mm Hg): --  CVP(cm H2O): --  CO: --  CI: --  PA: --  SVR: --    I&O's Detail    15 [place] 2025 07:01  -  16 Jan 2025 07:00  --------------------------------------------------------  IN:    Amiodarone: 799.2 mL    Insulin: 94 mL    IV PiggyBack: 600 mL    Oral Fluid: 1500 mL    sodium chloride 0.9%: 480 mL  Total IN: 3473.2 mL    OUT:    Chest Tube (mL): 225 mL    Indwelling Catheter - Urethral (mL): 1265 mL  Total OUT: 1490 mL        Net: I&O's Detail    14 Jan 2025 07:01  -  15 [place] 2025 07:00  --------------------------------------------------------  Total NET: 1073.5 mL      15 [place] 2025 07:01  -  16 Jan 2025 07:00  --------------------------------------------------------  Total NET: 1983.2 mL        CAPILLARY BLOOD GLUCOSE      POCT Blood Glucose.: 155 mg/dL (16 Jan 2025 07:13)  POCT Blood Glucose.: 151 mg/dL (16 Jan 2025 05:54)  POCT Blood Glucose.: 124 mg/dL (16 Jan 2025 01:36)  POCT Blood Glucose.: 106 mg/dL (15 [place] 2025 23:59)  POCT Blood Glucose.: 99 mg/dL (15 [place] 2025 21:29)  POCT Blood Glucose.: 108 mg/dL (15 [place] 2025 19:02)  POCT Blood Glucose.: 165 mg/dL (15 [place] 2025 18:02)  POCT Blood Glucose.: 168 mg/dL (15 [place] 2025 17:21)  POCT Blood Glucose.: 134 mg/dL (15 [place] 2025 16:05)  POCT Blood Glucose.: 146 mg/dL (15 [place] 2025 15:08)  POCT Blood Glucose.: 106 mg/dL (15 [place] 2025 14:20)  POCT Blood Glucose.: 135 mg/dL (15 [place] 2025 13:00)  POCT Blood Glucose.: 122 mg/dL (15 [place] 2025 11:55)  POCT Blood Glucose.: 89 mg/dL (15 [place] 2025 11:07)  POCT Blood Glucose.: 112 mg/dL (15 [place] 2025 10:06)  POCT Blood Glucose.: 138 mg/dL (15 [place] 2025 09:06)    A1C with Estimated Average Glucose Result: 9.9 % (01-08-25 @ 09:29)  A1C with Estimated Average Glucose Result: 9.6 % (12-11-24 @ 07:42)      Physical Exam:  General: NAD; A&Ox3  Neuro: pupils equal and reactive, speech clear, no overt sensory or motor deficts  Cardiac: S1/S2, RRR, no murmur, no rubs  Lungs: unlabored respirations, CTA b/l, no wheeze, no rales, no crackles  Abdomen: Soft/NT/ND; positive bowel sounds x 4  Sternum: Intact, no click, incision healing well with no drainage  Incisions: Incisions clean/dry/intact  Extremities: No edema b/l lower extremities; good capillary refill; no cyanosis; palpable 1+ pedal pulses b/l    Central Venous Catheter: Yes: critical illness, intravenous access  Day #  Juarez Catheter: Yes: critical illness; monitor strict i/o's                    Day #  EPICARDIAL WIRES:  YES                                                                         Day #  BOWEL MOVEMENT:  [] YES [] NO, If No, Timing since last BM Day #  CHEST TUBE(MS/Left/Right):  [] YES [] NO, If yes -  AIR LEAKS:  YES/NO        LABS:                        12.2[L]  12.44[H] )-----------( 106[L]    ( 16 Jan 2025 01:55 )             35.3[L]                        11.9[L]  11.32[H] )-----------( 120[L]    ( 15 [place] 2025 01:45 )             34.4[L]    01-16    132[L]  |  102  |  15  ----------------------------<  127[H]  4.3   |  19  |  0.8  01-15    138  |  107  |  14  ----------------------------<  135[H]  4.3   |  22  |  0.9    Ca    7.9[L]      16 Jan 2025 01:55  Mg     2.2     01-16    TPro  5.2[L] [6.0 - 8.0]  /  Alb  3.5 [3.5 - 5.2]  /  TBili  1.1 [0.2 - 1.2]  /  DBili  x   /  AST  97[H] [0 - 41]  /  ALT  36 [0 - 41]  /  AlkPhos  52 [30 - 115]  01-16    PT/INR - ( 16 Jan 2025 01:55 )   PT: ;   INR: 1.16 ratio         PT/INR - ( 15 [place] 2025 01:45 )   PT: ;   INR: 1.05 ratio         PTT - ( 16 Jan 2025 01:55 )  PTT:26.6 sec, PTT - ( 15 [place] 2025 01:45 )  PTT:25.5 sec  Urinalysis Basic - ( 16 Jan 2025 01:55 )    Color: x / Appearance: x / SG: x / pH: x  Gluc: 127 mg/dL / Ketone: x  / Bili: x / Urobili: x   Blood: x / Protein: x / Nitrite: x   Leuk Esterase: x / RBC: x / WBC x   Sq Epi: x / Non Sq Epi: x / Bacteria: x      ABG - ( 15 [place] 2025 04:55 )  pH: 7.40  /  pCO2: 38    /  pO2: 106   / HCO3: 24    / Base Excess: -1.1  /  SaO2: 99.8  /  LA: 1.2              RADIOLOGY & ADDITIONAL TESTS:  CXR:   EKG:    Allergies    No Known Allergies    Intolerances      MEDICATIONS  (STANDING):  aspirin enteric coated 81 milliGRAM(s) Oral daily  bisacodyl Suppository 10 milliGRAM(s) Rectal once  chlorhexidine 2% Cloths 1 Application(s) Topical daily  dextrose 50% Injectable 50 milliLiter(s) IV Push every 15 minutes  dextrose 50% Injectable 25 milliLiter(s) IV Push every 15 minutes  famotidine    Tablet 20 milliGRAM(s) Oral two times a day  heparin   Injectable 5000 Unit(s) SubCutaneous every 8 hours  insulin regular Infusion 5 Unit(s)/Hr (5 mL/Hr) IV Continuous <Continuous>  metoprolol tartrate 25 milliGRAM(s) Oral two times a day  niCARdipine Infusion 5 mG/Hr (25 mL/Hr) IV Continuous <Continuous>  norepinephrine Infusion 0.05 MICROgram(s)/kG/Min (10.6 mL/Hr) IV Continuous <Continuous>  polyethylene glycol 3350 17 Gram(s) Oral daily  senna 2 Tablet(s) Oral at bedtime  sodium chloride 0.9%. 1000 milliLiter(s) (20 mL/Hr) IV Continuous <Continuous>    MEDICATIONS  (PRN):  oxyCODONE    IR 5 milliGRAM(s) Oral every 4 hours PRN Moderate Pain (4 - 6)  oxyCODONE    IR 10 milliGRAM(s) Oral every 4 hours PRN Severe Pain (7 - 10)    Home Medications:  lisinopril 5 mg oral tablet: 1 tab(s) orally once a day (14 Jan 2025 06:41)  metFORMIN 750 mg oral tablet, extended release: 1 tab(s) orally (14 Jan 2025 06:41)      Pharmacologic DVT Prophylaxis [] YES, [] NO: Contraindication:  SCD's: YES b/l    GI Prophylaxis:     Post-Op Beta-Blockers: [] Yes, [] No: contraindication:  Post-Op CCB: [] Yes, [] No: contraindication:  Post-Op Aspirin:  [] Yes, [] No: contraindication:  Post-Op Statin:  [] Yes, [] No: contraindication:    Ambulation/Activity Status:    Assessment/Plan:  59y Male status-post  - Case and plan discussed with CTU Intensivist and CT Surgeon - Dr. Montemayor/Timoteo/Rosalee/Laxmi  - Continue CTU supportive care and ongoing plan of care as per continuing CTU rounds.   - Continue DVT/GI prophylaxis  - Incentive Spirometry 10 times an hour  - Continue to advance physical activity as tolerated and continue PT/OT as directed  1. CAD s/p CABG: Continue ASA, statin, BB  2. HTN:   3. Post-op A. Fib ppx:   4. COPD/Hypoxia:   5. DM/Glucose Control:     Social Service Disposition:     OPERATIVE PROCEDURE(s): AVR               POD #   2                    SURGEON(s): ERIC Montemayor    HPI:      SUBJECTIVE ASSESSMENT: 59yMale patient seen and examined at bedside. Pt complains of incisional pain    Vital Signs Last 24 Hrs  T(F): 98.3 (16 Jan 2025 08:00), Max: 99.1 (15 [place] 2025 16:00)  HR: 75 (16 Jan 2025 08:00) (71 - 91)  ABP: 137/71 (16 Jan 2025 08:00) (97/49 - 137/71)  ABP(mean): 93 (16 Jan 2025 08:00)  RR: 19 (16 Jan 2025 08:00) (0 - 26)  SpO2: 95% (16 Jan 2025 08:00) (91% - 98%)      I&O's Detail    15 [place] 2025 07:01  -  16 Jan 2025 07:00  --------------------------------------------------------  IN:    Amiodarone: 799.2 mL    Insulin: 94 mL    IV PiggyBack: 600 mL    Oral Fluid: 1500 mL    sodium chloride 0.9%: 480 mL  Total IN: 3473.2 mL    OUT:    Chest Tube (mL): 225 mL    Indwelling Catheter - Urethral (mL): 1265 mL  Total OUT: 1490 mL        Net: I&O's Detail    14 Jan 2025 07:01  -  15 [place] 2025 07:00  --------------------------------------------------------  Total NET: 1073.5 mL      15 [place] 2025 07:01  -  16 Jan 2025 07:00  --------------------------------------------------------  Total NET: 1983.2 mL        CAPILLARY BLOOD GLUCOSE      POCT Blood Glucose.: 155 mg/dL (16 Jan 2025 07:13)  POCT Blood Glucose.: 151 mg/dL (16 Jan 2025 05:54)  POCT Blood Glucose.: 124 mg/dL (16 Jan 2025 01:36)  POCT Blood Glucose.: 106 mg/dL (15 [place] 2025 23:59)  POCT Blood Glucose.: 99 mg/dL (15 [place] 2025 21:29)  POCT Blood Glucose.: 108 mg/dL (15 [place] 2025 19:02)  POCT Blood Glucose.: 165 mg/dL (15 [place] 2025 18:02)  POCT Blood Glucose.: 168 mg/dL (15 [place] 2025 17:21)  POCT Blood Glucose.: 134 mg/dL (15 [place] 2025 16:05)  POCT Blood Glucose.: 146 mg/dL (15 [place] 2025 15:08)  POCT Blood Glucose.: 106 mg/dL (15 [place] 2025 14:20)  POCT Blood Glucose.: 135 mg/dL (15 [place] 2025 13:00)  POCT Blood Glucose.: 122 mg/dL (15 [place] 2025 11:55)  POCT Blood Glucose.: 89 mg/dL (15 [place] 2025 11:07)  POCT Blood Glucose.: 112 mg/dL (15 [place] 2025 10:06)  POCT Blood Glucose.: 138 mg/dL (15 [place] 2025 09:06)    A1C with Estimated Average Glucose Result: 9.9 % (01-08-25 @ 09:29)  A1C with Estimated Average Glucose Result: 9.6 % (12-11-24 @ 07:42)      Physical Exam:  General: NAD; A&Ox3  Neuro: pupils equal and reactive, speech clear, no overt sensory or motor deficts  Cardiac: S1/S2, RRR, no murmur, no rubs  Lungs: unlabored respirations, CTA b/l, no wheeze, no rales, no crackles  Abdomen: Soft/NT/ND  Sternum: Intact, no click, incision healing well with no drainage  Incisions: Incisions clean/dry/intact  Extremities: No edema b/l lower extremities; good capillary refill; no cyanosis, palpable pedal pulses    Central Venous Catheter: Yes: critical illness, intravenous access  Day #2  Soto Catheter: Yes: critical illness; monitor strict i/o's                    Day #2 - Will d/c today  EPICARDIAL WIRES:  YES                                                                         Day #2  BOWEL MOVEMENT:  [] YES [x] NO, If No, Timing since last BM Day #  CHEST TUBE(MS/Left/Right):  [x] YES [] NO, If yes -  AIR LEAKS:  NO  - Will d/c today      LABS:                        12.2[L]  12.44[H] )-----------( 106[L]    ( 16 Jan 2025 01:55 )             35.3[L]                        11.9[L]  11.32[H] )-----------( 120[L]    ( 15 [place] 2025 01:45 )             34.4[L]    01-16    132[L]  |  102  |  15  ----------------------------<  127[H]  4.3   |  19  |  0.8  01-15    138  |  107  |  14  ----------------------------<  135[H]  4.3   |  22  |  0.9    Ca    7.9[L]      16 Jan 2025 01:55  Mg     2.2     01-16    TPro  5.2[L] [6.0 - 8.0]  /  Alb  3.5 [3.5 - 5.2]  /  TBili  1.1 [0.2 - 1.2]  /  DBili  x   /  AST  97[H] [0 - 41]  /  ALT  36 [0 - 41]  /  AlkPhos  52 [30 - 115]  01-16    PT/INR - ( 16 Jan 2025 01:55 )   PT: ;   INR: 1.16 ratio         PT/INR - ( 15 [place] 2025 01:45 )   PT: ;   INR: 1.05 ratio         PTT - ( 16 Jan 2025 01:55 )  PTT:26.6 sec, PTT - ( 15 [place] 2025 01:45 )  PTT:25.5 sec  Urinalysis Basic - ( 16 Jan 2025 01:55 )    Color: x / Appearance: x / SG: x / pH: x  Gluc: 127 mg/dL / Ketone: x  / Bili: x / Urobili: x   Blood: x / Protein: x / Nitrite: x   Leuk Esterase: x / RBC: x / WBC x   Sq Epi: x / Non Sq Epi: x / Bacteria: x      ABG - ( 15 [place] 2025 04:55 )  pH: 7.40  /  pCO2: 38    /  pO2: 106   / HCO3: 24    / Base Excess: -1.1  /  SaO2: 99.8  /  LA: 1.2              RADIOLOGY & ADDITIONAL TESTS:  CXR: < from: Xray Chest 1 View- PORTABLE-Routine (01.16.25 @ 06:16) >  IMPRESSION:    Low lung volumes. No acute pulmonary process.      < end of copied text >    EKG:< from: 12 Lead ECG (01.16.25 @ 08:38) >  Ventricular Rate 76 BPM    Atrial Rate 76 BPM    P-R Interval 154 ms    QRS Duration 90 ms    Q-T Interval 434 ms    QTC Calculation(Bazett) 488 ms    P Axis 23 degrees    R Axis 36 degrees    T Axis 229 degrees    Diagnosis Line Normal sinus rhythm  Septal infarct , age undetermined  T wave abnormality, consider inferior ischemia  Abnormal ECG    < end of copied text >      Allergies    No Known Allergies    Intolerances      MEDICATIONS  (STANDING):  aspirin enteric coated 81 milliGRAM(s) Oral daily  bisacodyl Suppository 10 milliGRAM(s) Rectal once  chlorhexidine 2% Cloths 1 Application(s) Topical daily  dextrose 50% Injectable 50 milliLiter(s) IV Push every 15 minutes  dextrose 50% Injectable 25 milliLiter(s) IV Push every 15 minutes  famotidine    Tablet 20 milliGRAM(s) Oral two times a day  heparin   Injectable 5000 Unit(s) SubCutaneous every 8 hours  insulin regular Infusion 5 Unit(s)/Hr (5 mL/Hr) IV Continuous <Continuous>  metoprolol tartrate 25 milliGRAM(s) Oral two times a day  niCARdipine Infusion 5 mG/Hr (25 mL/Hr) IV Continuous <Continuous>  norepinephrine Infusion 0.05 MICROgram(s)/kG/Min (10.6 mL/Hr) IV Continuous <Continuous>  polyethylene glycol 3350 17 Gram(s) Oral daily  senna 2 Tablet(s) Oral at bedtime  sodium chloride 0.9%. 1000 milliLiter(s) (20 mL/Hr) IV Continuous <Continuous>    MEDICATIONS  (PRN):  oxyCODONE    IR 5 milliGRAM(s) Oral every 4 hours PRN Moderate Pain (4 - 6)  oxyCODONE    IR 10 milliGRAM(s) Oral every 4 hours PRN Severe Pain (7 - 10)    Home Medications:  lisinopril 5 mg oral tablet: 1 tab(s) orally once a day (14 Jan 2025 06:41)  metFORMIN 750 mg oral tablet, extended release: 1 tab(s) orally (14 Jan 2025 06:41)      Pharmacologic DVT Prophylaxis [x] YES, [] NO: Contraindication:  SCD's: YES b/l    GI Prophylaxis: Pepcid    Post-Op Beta-Blockers: [x] Yes, [] No: contraindication:  Post-Op CCB: [] Yes, [x] No: contraindication:  Post-Op Aspirin:  [x] Yes, [] No: contraindication:  Post-Op Statin:  [] Yes, [x] No: contraindication: Will be started outpatient    Ambulation/Activity Status:  - ambulate as tolerated with assistance    Assessment/Plan:  59y Male status-post  - Case and plan discussed with CTU Intensivist and CT Surgeon - Dr. Montemayor/Timoteo/Rosalee/Laxmi  - Continue CTU supportive care and ongoing plan of care as per continuing CTU rounds.   - Continue DVT/GI prophylaxis  - Incentive Spirometry 10 times an hour  - Continue to advance physical activity as tolerated and continue PT/OT as directed  - D/c chest tube/ soto/ arterial line/ TVP  - No BM yet. Bowel regimen with Miralax and senna  1. Aortic stenosis s/p AVR: Transition amiodarone gtt to 200 BID PO. Metoprolol tartrate 25mg BID. Lasix 20mg IV given this AM  2. HTN: Metoprolol tartrate 25mg PO BID  3. Post-op A. Fib: Amiodarone 200mg PO BID  4. COPD/Hypoxia: O2 sat stable on RA  5. DM/Glucose Control: Lantus 30U qAM and Lispro 10 preprandial    Social Service Disposition:     OPERATIVE PROCEDURE(s): AVR               POD #   2                    SURGEON(s): ERIC Montemayor      SUBJECTIVE ASSESSMENT: 59yMale patient seen and examined at bedside. Complaining of incisional pain    Vital Signs Last 24 Hrs  T(F): 98.3 (16 Jan 2025 08:00), Max: 99.1 (15 [place] 2025 16:00)  HR: 75 (16 Jan 2025 08:00) (71 - 91)  ABP: 137/71 (16 Jan 2025 08:00) (97/49 - 137/71)  ABP(mean): 93 (16 Jan 2025 08:00)  RR: 19 (16 Jan 2025 08:00) (0 - 26)  SpO2: 95% (16 Jan 2025 08:00) (91% - 98%)      I&O's Detail    15 [place] 2025 07:01  -  16 Jan 2025 07:00  --------------------------------------------------------  IN:    Amiodarone: 799.2 mL    Insulin: 94 mL    IV PiggyBack: 600 mL    Oral Fluid: 1500 mL    sodium chloride 0.9%: 480 mL  Total IN: 3473.2 mL    OUT:    Chest Tube (mL): 225 mL    Indwelling Catheter - Urethral (mL): 1265 mL  Total OUT: 1490 mL        Net: I&O's Detail    14 Jan 2025 07:01  -  15 [place] 2025 07:00  --------------------------------------------------------  Total NET: 1073.5 mL      15 [place] 2025 07:01  -  16 Jan 2025 07:00  --------------------------------------------------------  Total NET: 1983.2 mL        CAPILLARY BLOOD GLUCOSE      POCT Blood Glucose.: 155 mg/dL (16 Jan 2025 07:13)  POCT Blood Glucose.: 151 mg/dL (16 Jan 2025 05:54)  POCT Blood Glucose.: 124 mg/dL (16 Jan 2025 01:36)  POCT Blood Glucose.: 106 mg/dL (15 [place] 2025 23:59)  POCT Blood Glucose.: 99 mg/dL (15 [place] 2025 21:29)  POCT Blood Glucose.: 108 mg/dL (15 [place] 2025 19:02)  POCT Blood Glucose.: 165 mg/dL (15 [place] 2025 18:02)  POCT Blood Glucose.: 168 mg/dL (15 [place] 2025 17:21)  POCT Blood Glucose.: 134 mg/dL (15 [place] 2025 16:05)  POCT Blood Glucose.: 146 mg/dL (15 [place] 2025 15:08)  POCT Blood Glucose.: 106 mg/dL (15 [place] 2025 14:20)  POCT Blood Glucose.: 135 mg/dL (15 [place] 2025 13:00)  POCT Blood Glucose.: 122 mg/dL (15 [place] 2025 11:55)  POCT Blood Glucose.: 89 mg/dL (15 [place] 2025 11:07)  POCT Blood Glucose.: 112 mg/dL (15 [place] 2025 10:06)  POCT Blood Glucose.: 138 mg/dL (15 [place] 2025 09:06)    A1C with Estimated Average Glucose Result: 9.9 % (01-08-25 @ 09:29)  A1C with Estimated Average Glucose Result: 9.6 % (12-11-24 @ 07:42)      Physical Exam:  General: NAD; A&Ox3  Neuro: pupils equal and reactive, speech clear, no overt sensory or motor deficits  Cardiac: S1/S2, RRR, no murmur, no rubs  Lungs: unlabored shallow respirations, CTA b/l, no wheeze, no rales, no crackles  Abdomen: Soft/NT/ND  Sternum: Intact, no click, incision healing well with no drainage  Incisions: Incisions clean/dry/intact  Extremities: Mild edema b/l lower extremities; good capillary refill; no cyanosis, palpable pedal pulses    Central Venous Catheter: Yes: critical illness, intravenous access  Day #2  Soto Catheter: Yes: critical illness; monitor strict i/o's                    Day #2  EPICARDIAL WIRES:  YES                                                                         Day #2  BOWEL MOVEMENT:  [] YES [x] NO, If No, Timing since last BM Day # 2  CHEST TUBE(MS/Left/Right):  [x] YES [] NO, If yes -  AIR LEAKS:  NO        LABS:                        12.2[L]  12.44[H] )-----------( 106[L]    ( 16 Jan 2025 01:55 )             35.3[L]                        11.9[L]  11.32[H] )-----------( 120[L]    ( 15 [place] 2025 01:45 )             34.4[L]    01-16    132[L]  |  102  |  15  ----------------------------<  127[H]  4.3   |  19  |  0.8  01-15    138  |  107  |  14  ----------------------------<  135[H]  4.3   |  22  |  0.9    Ca    7.9[L]      16 Jan 2025 01:55  Mg     2.2     01-16    TPro  5.2[L] [6.0 - 8.0]  /  Alb  3.5 [3.5 - 5.2]  /  TBili  1.1 [0.2 - 1.2]  /  DBili  x   /  AST  97[H] [0 - 41]  /  ALT  36 [0 - 41]  /  AlkPhos  52 [30 - 115]  01-16    PT/INR - ( 16 Jan 2025 01:55 )   PT: ;   INR: 1.16 ratio         PT/INR - ( 15 [place] 2025 01:45 )   PT: ;   INR: 1.05 ratio         PTT - ( 16 Jan 2025 01:55 )  PTT:26.6 sec, PTT - ( 15 [place] 2025 01:45 )  PTT:25.5 sec        ABG - ( 15 [place] 2025 04:55 )  pH: 7.40  /  pCO2: 38    /  pO2: 106   / HCO3: 24    / Base Excess: -1.1  /  SaO2: 99.8  /  LA: 1.2              RADIOLOGY & ADDITIONAL TESTS:  CXR: < from: Xray Chest 1 View- PORTABLE-Routine (01.16.25 @ 06:16) >  IMPRESSION:    Low lung volumes. No acute pulmonary process.      < end of copied text >    EKG:< from: 12 Lead ECG (01.16.25 @ 08:38) >  Ventricular Rate 76 BPM    Atrial Rate 76 BPM    P-R Interval 154 ms    QRS Duration 90 ms    Q-T Interval 434 ms    QTC Calculation(Bazett) 488 ms    P Axis 23 degrees    R Axis 36 degrees    T Axis 229 degrees    Diagnosis Line Normal sinus rhythm  Septal infarct , age undetermined  T wave abnormality, consider inferior ischemia  Abnormal ECG    < end of copied text >      Allergies    No Known Allergies    Intolerances      MEDICATIONS  (STANDING):  aspirin enteric coated 81 milliGRAM(s) Oral daily  bisacodyl Suppository 10 milliGRAM(s) Rectal once  chlorhexidine 2% Cloths 1 Application(s) Topical daily  dextrose 50% Injectable 50 milliLiter(s) IV Push every 15 minutes  dextrose 50% Injectable 25 milliLiter(s) IV Push every 15 minutes  famotidine    Tablet 20 milliGRAM(s) Oral two times a day  heparin   Injectable 5000 Unit(s) SubCutaneous every 8 hours  insulin regular Infusion 5 Unit(s)/Hr (5 mL/Hr) IV Continuous <Continuous>  metoprolol tartrate 25 milliGRAM(s) Oral two times a day  niCARdipine Infusion 5 mG/Hr (25 mL/Hr) IV Continuous <Continuous>  norepinephrine Infusion 0.05 MICROgram(s)/kG/Min (10.6 mL/Hr) IV Continuous <Continuous>  polyethylene glycol 3350 17 Gram(s) Oral daily  senna 2 Tablet(s) Oral at bedtime  sodium chloride 0.9%. 1000 milliLiter(s) (20 mL/Hr) IV Continuous <Continuous>    MEDICATIONS  (PRN):  oxyCODONE    IR 5 milliGRAM(s) Oral every 4 hours PRN Moderate Pain (4 - 6)  oxyCODONE    IR 10 milliGRAM(s) Oral every 4 hours PRN Severe Pain (7 - 10)    Home Medications:  lisinopril 5 mg oral tablet: 1 tab(s) orally once a day (14 Jan 2025 06:41)  metFORMIN 750 mg oral tablet, extended release: 1 tab(s) orally (14 Jan 2025 06:41)      Pharmacologic DVT Prophylaxis [x] YES, [] NO: Contraindication:  SCD's: YES b/l    GI Prophylaxis: Pepcid    Post-Op Beta-Blockers: [x] Yes, [] No: contraindication:  Post-Op CCB: [] Yes, [x] No: contraindication:  Post-Op Aspirin:  [x] Yes, [] No: contraindication:      Ambulation/Activity Status: ambulate with assistance    Assessment/Plan:  59y Male status-post AVR POD #2  - Case and plan discussed with CTU Intensivist and CT Surgeon - Dr. Montemayor/Timoteo/Rosalee  - Continue CTU supportive care and ongoing plan of care as per continuing CTU rounds.   - Continue DVT/GI prophylaxis  - Incentive Spirometry 10 times an hour  - Continue to advance physical activity as tolerated and continue PT/OT as directed  1. Aortic stenosis s/p AVR: Continue ASA & BB. Pain control as needed.   2. HTN: PO BB  3. Post-op A. Fib: afib this morning, amio bolus and gtt @ 1 started. Procaine 1gm administered, converted NSR. continue to monitor and replace electrolytes as needed.   4. COPD/Hypoxia: currently on supplemental O2, duonebs ordered. encourage cough and deep breathing  5. DM/Glucose Control: inuslin gtt d/c'd. Lantus 30U AM, Lispro 10 prandial, RISS coverage  - D/c chest tube/Lola/soto  - Keep epicardial wires    Social Service Disposition: TBD

## 2025-01-16 NOTE — CHART NOTE - NSCHARTNOTEFT_GEN_A_CORE
Discussed with pt at bedside this morning. Pt reports having a good appetite; consumed >75% of breakfast this morning. Tolerating diet texture/consistency well. No nausea or vomiting reported. Last BM prior to admit.     Nutrition Hx: Pt had a good appetite prior to admit; consumed 3 meals daily. Pt takes a multivitamin inconsistently. Pt drinks Ensure once a month. NKFA; denies any restrictive cultural or Denominational food preferences. No chewing or swallowing difficulties noted with regular textured food.     Low risk f/u    RD to remain available: Lucy Fry [phone] or TEAMS Discussed with pt at bedside this morning. Pt reports having a good appetite; consumed >75% of breakfast this morning. Tolerating diet texture/consistency well. No nausea or vomiting reported. Last BM prior to admit.     Nutrition Hx: Pt had a good appetite prior to admit; consumed 3 meals daily. Pt takes a multivitamin inconsistently. Pt drinks Ensure once a month. NKFA; denies any restrictive cultural or Worship food preferences. No chewing or swallowing difficulties noted with regular textured food. #/113 KG -- checked 1 month ago per pt. Current dosing weight is 113 KG. Weight stable within 1 month compared to current dosing weight. Pt does not meet weight criteria for malnutrition at this time. NFPE: no muscle mass or body fat loss at this time.     Low risk f/u    RD to remain available: Lucy Fry [phone] or TEAMS

## 2025-01-16 NOTE — PROGRESS NOTE ADULT - SUBJECTIVE AND OBJECTIVE BOX
CTU Attending Progress Daily Note     16 Jan 2025 10:37  Procedure: AVR  POD#: 2    He has history of DM (diabetes mellitus)    HTN (hypertension)    H/O aortic valve stenosis      OBJECTIVE:  ICU Vital Signs Last 24 Hrs  T(C): 36.8 (16 Jan 2025 08:00), Max: 37.3 (15 [place] 2025 16:00)  T(F): 98.3 (16 Jan 2025 08:00), Max: 99.1 (15 [place] 2025 16:00)  HR: 76 (16 Jan 2025 10:00) (71 - 91)  BP: --  BP(mean): --  ABP: 148/70 (16 Jan 2025 10:00) (97/49 - 148/70)  ABP(mean): 95 (16 Jan 2025 10:00) (63 - 95)  RR: 15 (16 Jan 2025 10:00) (0 - 26)  SpO2: 94% (16 Jan 2025 10:00) (91% - 98%)    O2 Parameters below as of 16 Jan 2025 10:00  Patient On (Oxygen Delivery Method): nasal cannula  O2 Flow (L/min): 3        I&O's Summary    15 [place] 2025 07:01  -  16 Jan 2025 07:00  --------------------------------------------------------  IN: 3473.2 mL / OUT: 1490 mL / NET: 1983.2 mL    16 Jan 2025 07:01  -  16 Jan 2025 10:37  --------------------------------------------------------  IN: 233.6 mL / OUT: 180 mL / NET: 53.6 mL      I&O's Detail    15 [place] 2025 07:01  -  16 Jan 2025 07:00  --------------------------------------------------------  IN:    Amiodarone: 799.2 mL    Insulin: 94 mL    IV PiggyBack: 600 mL    Oral Fluid: 1500 mL    sodium chloride 0.9%: 480 mL  Total IN: 3473.2 mL    OUT:    Chest Tube (mL): 225 mL    Indwelling Catheter - Urethral (mL): 1265 mL  Total OUT: 1490 mL    Total NET: 1983.2 mL      16 Jan 2025 07:01  -  16 Jan 2025 10:37  --------------------------------------------------------  IN:    Amiodarone: 66.6 mL    Insulin: 7 mL    Oral Fluid: 100 mL    sodium chloride 0.9%: 60 mL  Total IN: 233.6 mL    OUT:    Chest Tube (mL): 30 mL    Indwelling Catheter - Urethral (mL): 150 mL  Total OUT: 180 mL    Total NET: 53.6 mL          CAPILLARY BLOOD GLUCOSE      POCT Blood Glucose.: 162 mg/dL (16 Jan 2025 09:17)  POCT Blood Glucose.: 155 mg/dL (16 Jan 2025 07:13)  POCT Blood Glucose.: 151 mg/dL (16 Jan 2025 05:54)  POCT Blood Glucose.: 124 mg/dL (16 Jan 2025 01:36)  POCT Blood Glucose.: 106 mg/dL (15 [place] 2025 23:59)  POCT Blood Glucose.: 99 mg/dL (15 [place] 2025 21:29)  POCT Blood Glucose.: 108 mg/dL (15 [place] 2025 19:02)  POCT Blood Glucose.: 165 mg/dL (15 [place] 2025 18:02)  POCT Blood Glucose.: 168 mg/dL (15 [place] 2025 17:21)  POCT Blood Glucose.: 134 mg/dL (15 [place] 2025 16:05)  POCT Blood Glucose.: 146 mg/dL (15 [place] 2025 15:08)  POCT Blood Glucose.: 106 mg/dL (15 [place] 2025 14:20)  POCT Blood Glucose.: 135 mg/dL (15 [place] 2025 13:00)  POCT Blood Glucose.: 122 mg/dL (15 [place] 2025 11:55)  POCT Blood Glucose.: 89 mg/dL (15 [place] 2025 11:07)    LABS:  ABG - ( 15 [place] 2025 04:55 )  pH, Arterial: 7.40  pH, Blood: x     /  pCO2: 38    /  pO2: 106   / HCO3: 24    / Base Excess: -1.1  /  SaO2: 99.8                                    12.2   12.44 )-----------( 106      ( 16 Jan 2025 01:55 )             35.3     01-16    132[L]  |  102  |  15  ----------------------------<  127[H]  4.3   |  19  |  0.8    Ca    7.9[L]      16 Jan 2025 01:55  Mg     2.2     01-16    TPro  5.2[L]  /  Alb  3.5  /  TBili  1.1  /  DBili  x   /  AST  97[H]  /  ALT  36  /  AlkPhos  52  01-16    PT/INR - ( 16 Jan 2025 01:55 )   PT: 13.70 sec;   INR: 1.16 ratio         PTT - ( 16 Jan 2025 01:55 )  PTT:26.6 sec  Urinalysis Basic - ( 16 Jan 2025 01:55 )    Color: x / Appearance: x / SG: x / pH: x  Gluc: 127 mg/dL / Ketone: x  / Bili: x / Urobili: x   Blood: x / Protein: x / Nitrite: x   Leuk Esterase: x / RBC: x / WBC x   Sq Epi: x / Non Sq Epi: x / Bacteria: x        Home Medications:  lisinopril 5 mg oral tablet: 1 tab(s) orally once a day (14 Jan 2025 06:41)  metFORMIN 750 mg oral tablet, extended release: 1 tab(s) orally (14 Jan 2025 06:41)    HOSPITAL MEDICATIONS:  MEDICATIONS  (STANDING):  albuterol/ipratropium for Nebulization 3 milliLiter(s) Nebulizer every 6 hours  aMIOdarone    Tablet 200 milliGRAM(s) Oral every 12 hours  aspirin enteric coated 81 milliGRAM(s) Oral daily  bisacodyl Suppository 10 milliGRAM(s) Rectal once  chlorhexidine 2% Cloths 1 Application(s) Topical daily  dextrose 5%. 1000 milliLiter(s) (50 mL/Hr) IV Continuous <Continuous>  dextrose 5%. 1000 milliLiter(s) (100 mL/Hr) IV Continuous <Continuous>  dextrose 50% Injectable 50 milliLiter(s) IV Push every 15 minutes  dextrose 50% Injectable 25 milliLiter(s) IV Push every 15 minutes  famotidine    Tablet 20 milliGRAM(s) Oral two times a day  glucagon  Injectable 1 milliGRAM(s) IntraMuscular once  heparin   Injectable 5000 Unit(s) SubCutaneous every 8 hours  insulin glargine Injectable (LANTUS) 30 Unit(s) SubCutaneous every morning  insulin lispro (ADMELOG) corrective regimen sliding scale   SubCutaneous three times a day before meals  insulin lispro Injectable (ADMELOG) 10 Unit(s) SubCutaneous three times a day before meals  insulin regular Infusion 5 Unit(s)/Hr (5 mL/Hr) IV Continuous <Continuous>  metoprolol tartrate 25 milliGRAM(s) Oral two times a day  polyethylene glycol 3350 17 Gram(s) Oral daily  senna 2 Tablet(s) Oral at bedtime  sodium chloride 0.9%. 1000 milliLiter(s) (20 mL/Hr) IV Continuous <Continuous>    MEDICATIONS  (PRN):  dextrose Oral Gel 15 Gram(s) Oral once PRN Blood Glucose LESS THAN 70 milliGRAM(s)/deciliter  oxyCODONE    IR 5 milliGRAM(s) Oral every 4 hours PRN Moderate Pain (4 - 6)  oxyCODONE    IR 10 milliGRAM(s) Oral every 4 hours PRN Severe Pain (7 - 10)    RADIOLOGY:  Chest X-ray Reviewed    REVIEW OF SYSTEMS:  CONSTITUTIONAL: [X] all negative; [ ] weakness, [ ] fevers, [ ] chills  EYES/ENT: [X] all negative; [ ] visual changes, [ ] vertigo, [ ] throat pain   NECK: [X] all negative; [ ] pain, [ ] stiffness  RESPIRATORY: [] all negative, [ ] cough, [ ] wheezing, [ ] hemoptysis, [ ] shortness of breath  CARDIOVASCULAR: [] all negative; [ ] chest pain, [ ] palpitations, [ ] orthopnea  GASTROINTESTINAL: [X] all negative; [ ]abdominal pain, [ ] nausea, [ ] vomiting, [ ] hematemesis, [ ] diarrhea, [ ] constipation, [ ] melena, [ ] hematochezia.  GENITOURINARY: [X] all negative; [ ] dysuria, [ ] frequency, [ ] hematuria  NEUROLOGICAL: [X] all negative; [ ] numbness, [ ] weakness  SKIN: [X] all negative; [ ] itching, [ ] burning, [ ] rashes, [ ] lesions   All other review of systems is negative unless indicated above.  [  ] Unable to assess ROS because     PHYSICAL EXAM:          CONSTITUTIONAL: Well-developed; well-nourished; in no acute distress.   	SKIN: warm, dry  	HEAD: Normocephalic; atraumatic.  	EYES: PERRL, EOM, no conj injection, sclera clear  	ENT: No nasal discharge; airway clear.  	NECK: Supple; non tender.   	CARD: S1, S2 normal; no murmurs, gallops, or rubs. Regular rate and rhythm.  no carotid bruits  	RESP: CTA B/L; good air movement No wheezes, rales or rhonchi.  	ABD: Soft, not tender, not distended,  no rebound or guarding, bowel sounds present  	EXT: Normal ROM.  No clubbing, cyanosis or edema.   warm and well perfused.  pulses palpable  	NEURO: Alert, awake, motor 5/5 R, 5/5 L    Assessment   s/p AVR    Plan:    Neuro:  Multimodal pain management  Tylenol, Lidoderm patch, gabapentin, opiates as needed  Monitor neuro status in the post op period    CV:  Monitor perfusion, , lactate, UOP, index and MVO2 if available  Maintain MAP > 65  ASA, statin  Beta blockers when able  Amio ppx per protocol  Remove chest tubes when able    Resp:    Supplemental oxygen to maintain sats > 92%  Continuous pulse oximetry monitoring  IS / Chest PT  OOBTC and Ambulate  Nebulizers as needed    GI:  Heart healthy diet  Bowel Regimen - escalate as needed  PUD ppx per protocol    Renal  High risk for DEVIKA post surgery  Monitor UOP, lytes, creatinine  Diuretics as needed for negative fluid balance  Keep K > 4, Mg > 2    Endo:  Tight glucose control per CTICU protocol  Insulin gtt as needed  Transition to Lantus / SSI and premeal insulin as needed    Heme:  daily CBC  DVT prophylaxis once bleeding risk post surgery is minimized    ID:  Perioperative prophylactic abx per protocol  Monitor fever curve and WBC count  Remove TLC when no longer indicated

## 2025-01-17 LAB
ALBUMIN SERPL ELPH-MCNC: 3.4 G/DL — LOW (ref 3.5–5.2)
ALP SERPL-CCNC: 73 U/L — SIGNIFICANT CHANGE UP (ref 30–115)
ALT FLD-CCNC: 27 U/L — SIGNIFICANT CHANGE UP (ref 0–41)
ANION GAP SERPL CALC-SCNC: 9 MMOL/L — SIGNIFICANT CHANGE UP (ref 7–14)
AST SERPL-CCNC: 51 U/L — HIGH (ref 0–41)
BASOPHILS # BLD AUTO: 0.04 K/UL — SIGNIFICANT CHANGE UP (ref 0–0.2)
BASOPHILS NFR BLD AUTO: 0.4 % — SIGNIFICANT CHANGE UP (ref 0–1)
BILIRUB SERPL-MCNC: 0.8 MG/DL — SIGNIFICANT CHANGE UP (ref 0.2–1.2)
BUN SERPL-MCNC: 15 MG/DL — SIGNIFICANT CHANGE UP (ref 10–20)
CALCIUM SERPL-MCNC: 8 MG/DL — LOW (ref 8.4–10.5)
CHLORIDE SERPL-SCNC: 100 MMOL/L — SIGNIFICANT CHANGE UP (ref 98–110)
CO2 SERPL-SCNC: 23 MMOL/L — SIGNIFICANT CHANGE UP (ref 17–32)
CREAT SERPL-MCNC: 0.8 MG/DL — SIGNIFICANT CHANGE UP (ref 0.7–1.5)
EGFR: 101 ML/MIN/1.73M2 — SIGNIFICANT CHANGE UP
EOSINOPHIL # BLD AUTO: 0.09 K/UL — SIGNIFICANT CHANGE UP (ref 0–0.7)
EOSINOPHIL NFR BLD AUTO: 0.9 % — SIGNIFICANT CHANGE UP (ref 0–8)
GLUCOSE BLDC GLUCOMTR-MCNC: 137 MG/DL — HIGH (ref 70–99)
GLUCOSE BLDC GLUCOMTR-MCNC: 159 MG/DL — HIGH (ref 70–99)
GLUCOSE BLDC GLUCOMTR-MCNC: 169 MG/DL — HIGH (ref 70–99)
GLUCOSE BLDC GLUCOMTR-MCNC: 182 MG/DL — HIGH (ref 70–99)
GLUCOSE SERPL-MCNC: 139 MG/DL — HIGH (ref 70–99)
HCT VFR BLD CALC: 32 % — LOW (ref 42–52)
HGB BLD-MCNC: 10.9 G/DL — LOW (ref 14–18)
IMM GRANULOCYTES NFR BLD AUTO: 0.5 % — HIGH (ref 0.1–0.3)
LYMPHOCYTES # BLD AUTO: 1.65 K/UL — SIGNIFICANT CHANGE UP (ref 1.2–3.4)
LYMPHOCYTES # BLD AUTO: 16.5 % — LOW (ref 20.5–51.1)
MAGNESIUM SERPL-MCNC: 2.1 MG/DL — SIGNIFICANT CHANGE UP (ref 1.8–2.4)
MCHC RBC-ENTMCNC: 31.3 PG — HIGH (ref 27–31)
MCHC RBC-ENTMCNC: 34.1 G/DL — SIGNIFICANT CHANGE UP (ref 32–37)
MCV RBC AUTO: 92 FL — SIGNIFICANT CHANGE UP (ref 80–94)
MONOCYTES # BLD AUTO: 1.07 K/UL — HIGH (ref 0.1–0.6)
MONOCYTES NFR BLD AUTO: 10.7 % — HIGH (ref 1.7–9.3)
NEUTROPHILS # BLD AUTO: 7.1 K/UL — HIGH (ref 1.4–6.5)
NEUTROPHILS NFR BLD AUTO: 71 % — SIGNIFICANT CHANGE UP (ref 42.2–75.2)
NRBC # BLD: 0 /100 WBCS — SIGNIFICANT CHANGE UP (ref 0–0)
NRBC BLD-RTO: 0 /100 WBCS — SIGNIFICANT CHANGE UP (ref 0–0)
PLATELET # BLD AUTO: 110 K/UL — LOW (ref 130–400)
PMV BLD: 10.4 FL — SIGNIFICANT CHANGE UP (ref 7.4–10.4)
POTASSIUM SERPL-MCNC: 4 MMOL/L — SIGNIFICANT CHANGE UP (ref 3.5–5)
POTASSIUM SERPL-SCNC: 4 MMOL/L — SIGNIFICANT CHANGE UP (ref 3.5–5)
PROT SERPL-MCNC: 5.4 G/DL — LOW (ref 6–8)
RBC # BLD: 3.48 M/UL — LOW (ref 4.7–6.1)
RBC # FLD: 13.2 % — SIGNIFICANT CHANGE UP (ref 11.5–14.5)
SODIUM SERPL-SCNC: 133 MMOL/L — LOW (ref 135–146)
SURGICAL PATHOLOGY STUDY: SIGNIFICANT CHANGE UP
WBC # BLD: 10 K/UL — SIGNIFICANT CHANGE UP (ref 4.8–10.8)
WBC # FLD AUTO: 10 K/UL — SIGNIFICANT CHANGE UP (ref 4.8–10.8)

## 2025-01-17 PROCEDURE — 71045 X-RAY EXAM CHEST 1 VIEW: CPT | Mod: 26

## 2025-01-17 PROCEDURE — 99233 SBSQ HOSP IP/OBS HIGH 50: CPT

## 2025-01-17 PROCEDURE — 99222 1ST HOSP IP/OBS MODERATE 55: CPT

## 2025-01-17 PROCEDURE — 93010 ELECTROCARDIOGRAM REPORT: CPT

## 2025-01-17 RX ORDER — AMIODARONE HYDROCHLORIDE 50 MG/ML
200 INJECTION, SOLUTION INTRAVENOUS
Refills: 0 | Status: DISCONTINUED | OUTPATIENT
Start: 2025-01-17 | End: 2025-01-19

## 2025-01-17 RX ADMIN — Medication 10 UNIT(S): at 11:09

## 2025-01-17 RX ADMIN — FAMOTIDINE 20 MILLIGRAM(S): 10 INJECTION INTRAVENOUS at 17:00

## 2025-01-17 RX ADMIN — Medication 10 UNIT(S): at 16:50

## 2025-01-17 RX ADMIN — INSULIN GLARGINE-YFGN 30 UNIT(S): 100 INJECTION, SOLUTION SUBCUTANEOUS at 08:05

## 2025-01-17 RX ADMIN — Medication 10 UNIT(S): at 08:04

## 2025-01-17 RX ADMIN — ANTISEPTIC SURGICAL SCRUB 1 APPLICATION(S): 0.04 SOLUTION TOPICAL at 05:22

## 2025-01-17 RX ADMIN — AMIODARONE HYDROCHLORIDE 200 MILLIGRAM(S): 50 INJECTION, SOLUTION INTRAVENOUS at 17:00

## 2025-01-17 RX ADMIN — Medication 20 MILLIGRAM(S): at 05:22

## 2025-01-17 RX ADMIN — IPRATROPIUM BROMIDE AND ALBUTEROL SULFATE 3 MILLILITER(S): .5; 2.5 SOLUTION RESPIRATORY (INHALATION) at 08:14

## 2025-01-17 RX ADMIN — Medication 1: at 11:09

## 2025-01-17 RX ADMIN — FAMOTIDINE 20 MILLIGRAM(S): 10 INJECTION INTRAVENOUS at 05:21

## 2025-01-17 RX ADMIN — Medication 25 MILLIGRAM(S): at 17:04

## 2025-01-17 RX ADMIN — Medication 25 MILLIGRAM(S): at 05:22

## 2025-01-17 RX ADMIN — Medication 1: at 08:05

## 2025-01-17 RX ADMIN — Medication 5000 UNIT(S): at 05:22

## 2025-01-17 RX ADMIN — ASPIRIN 81 MILLIGRAM(S): 81 TABLET, COATED ORAL at 11:02

## 2025-01-17 RX ADMIN — Medication 5000 UNIT(S): at 13:12

## 2025-01-17 RX ADMIN — Medication 5000 UNIT(S): at 21:52

## 2025-01-17 NOTE — PROGRESS NOTE ADULT - ASSESSMENT
Assessment/Plan:  Aortic Valve Stenosis-s/p AVR-POD #3  1-BP control-metoprolol  2-serum glucose control-Lantus/Lispro with insulin sliding scale correction regimen  3-fluid overload-diuresis  4-acute blood loss anemia-stable, monitor Hb/Hct daily  3-svnpxbawtcbqiymd-waydsv, monitor plts daily  9-imcrxhkuulsrgai-hwaucnmtw, monitor daily  7-A-Fib, now NSR-continue beta-blockers    40 minutes of critical care services provided

## 2025-01-17 NOTE — CONSULT NOTE ADULT - ATTENDING COMMENTS
Patient seen and examined and agree with above except as noted.  Patients history, notes, labs, imaging, vitals and meds reviewed personally.  Exam shows slight left facial asymmetry which corrects with smiling and slight LUE and LLE weakness 5-/5  Given this and visual complaints would obtain CTH to rule out ischemic event  Does admit to history of occular migraines with kaleidoscope vision during episodes      Plan as above

## 2025-01-17 NOTE — PROGRESS NOTE ADULT - SUBJECTIVE AND OBJECTIVE BOX
OPERATIVE PROCEDURE(s): AVR               POD #3                       59yMale    SURGEON(s): ERIC Montemayor    SUBJECTIVE ASSESSMENT: Pt seen and examined at bedside. Pt went into Afib last night and loaded with amiodarone. Patient also endorsed seeing spots, floaters and visual hallucinations, which is thought to be caused by the amio. Pt currently denies visual hallucinations, vision changes, lightheadedness, dizziness, gait imbalance, dysphagia and slurred speech.  Vital Signs Last 24 Hrs  T(F): 99.1 (17 Jan 2025 08:00), Max: 99.1 (17 Jan 2025 08:00)  HR: 79 (17 Jan 2025 10:00) (71 - 122)  BP: 118/70 (17 Jan 2025 10:00) (88/58 - 148/79)  BP(mean): 88 (17 Jan 2025 10:00) (68 - 106)  RR: 7 (17 Jan 2025 10:00) (7 - 34)  SpO2: 95% (17 Jan 2025 10:00) (93% - 97%)      I&O's Detail    16 Jan 2025 07:01  -  17 Jan 2025 07:00  --------------------------------------------------------  IN:    Amiodarone: 66.6 mL    Amiodarone: 266.4 mL    Amiodarone: 33.4 mL    Insulin: 15 mL    IV PiggyBack: 350 mL    Oral Fluid: 560 mL    sodium chloride 0.9%: 80 mL  Total IN: 1371.4 mL    OUT:    Chest Tube (mL): 40 mL    Indwelling Catheter - Urethral (mL): 250 mL    Voided (mL): 1400 mL  Total OUT: 1690 mL        Net:   I&O's Detail    15 [place] 2025 07:01  -  16 Jan 2025 07:00  --------------------------------------------------------  Total NET: 1983.2 mL      16 Jan 2025 07:01  -  17 Jan 2025 07:00  --------------------------------------------------------  Total NET: -318.6 mL        CAPILLARY BLOOD GLUCOSE      POCT Blood Glucose.: 169 mg/dL (17 Jan 2025 07:43)  POCT Blood Glucose.: 153 mg/dL (16 Jan 2025 21:45)  POCT Blood Glucose.: 166 mg/dL (16 Jan 2025 16:31)  POCT Blood Glucose.: 136 mg/dL (16 Jan 2025 11:17)    Physical Exam:  General: NAD; A&Ox3  Cardiac: S1/S2, RRR, no murmur  Lungs: unlabored respirations, CTA b/l  Abdomen: Soft/NT/ND  Sternum: Intact, no click, incision healing well with no drainage  Incisions: Incisions clean/dry/intact  Extremities: No edema b/l lower extremities; good capillary refill; no cyanosis; palpable pedal pulses    Central Venous Catheter: Yes[x]  No[] , If Yes indication:           Day #3  Juarez Catheter: Yes  [] , No  [x] , If yes indication:                      Day #  NGT: Yes [] No [x] ,    If Yes Placement:                                     Day #  EPICARDIAL WIRES:  [x] YES [] NO                                              Day #3  BOWEL MOVEMENT:  [x] YES [] NO, If No, Timing since last BM:      Day #  CHEST TUBE(Left/Right):  [] YES [x] NO, If yes -  AIR LEAKS:  [] YES [] NO        LABS:                        10.9[L]  10.00 )-----------( 110[L]    ( 17 Jan 2025 01:41 )             32.0[L]                        12.2[L]  12.44[H] )-----------( 106[L]    ( 16 Jan 2025 01:55 )             35.3[L]    01-17    133[L]  |  100  |  15  ----------------------------<  139[H]  4.0   |  23  |  0.8  01-16    132[L]  |  102  |  15  ----------------------------<  127[H]  4.3   |  19  |  0.8    Ca    8.0[L]      17 Jan 2025 01:41  Mg     2.1     01-17    TPro  5.4[L] [6.0 - 8.0]  /  Alb  3.4[L] [3.5 - 5.2]  /  TBili  0.8 [0.2 - 1.2]  /  DBili  x   /  AST  51[H] [0 - 41]  /  ALT  27 [0 - 41]  /  AlkPhos  73 [30 - 115]  01-17    PT/INR - ( 16 Jan 2025 01:55 )   PT: ;   INR: 1.16 ratio         PTT - ( 16 Jan 2025 01:55 )  PTT:26.6 sec  Urinalysis Basic - ( 17 Jan 2025 01:41 )    Color: x / Appearance: x / SG: x / pH: x  Gluc: 139 mg/dL / Ketone: x  / Bili: x / Urobili: x   Blood: x / Protein: x / Nitrite: x   Leuk Esterase: x / RBC: x / WBC x   Sq Epi: x / Non Sq Epi: x / Bacteria: x        RADIOLOGY & ADDITIONAL TESTS:  CXR:< from: Xray Chest 1 View- PORTABLE-Routine (01.17.25 @ 07:34) >  Findings:    Support devices: Unchanged right IJ vascular sheath.    Cardiac/mediastinum/hilum: Post median sternotomy. Stable cardiac   silhouette. Post aortic valve replacement.    Lung parenchyma/Pleura: Unchanged elevated right hemidiaphragm. No   evidence of pneumothorax.    Skeleton/soft tissues: Unchanged.    Impression:    No radiographic evidence of acute cardiopulmonary disease.    < end of copied text >    EKG:< from: 12 Lead ECG (01.17.25 @ 07:52) >  Ventricular Rate 79 BPM    Atrial Rate 79 BPM    P-R Interval 180 ms    QRS Duration 90 ms    Q-T Interval 418 ms    QTC Calculation(Bazett) 479 ms    P Axis 22 degrees    R Axis 21 degrees    T Axis 204 degrees    Diagnosis Line Normal sinus rhythm  Septal infarct , age undetermined  T wave abnormality, consider inferolateral ischemia  Abnormal ECG    < end of copied text >    MEDICATIONS  (STANDING):  albuterol/ipratropium for Nebulization 3 milliLiter(s) Nebulizer every 6 hours  aspirin enteric coated 81 milliGRAM(s) Oral daily  bisacodyl Suppository 10 milliGRAM(s) Rectal once  chlorhexidine 2% Cloths 1 Application(s) Topical daily  dextrose 5%. 1000 milliLiter(s) (50 mL/Hr) IV Continuous <Continuous>  dextrose 5%. 1000 milliLiter(s) (100 mL/Hr) IV Continuous <Continuous>  dextrose 50% Injectable 50 milliLiter(s) IV Push every 15 minutes  dextrose 50% Injectable 25 milliLiter(s) IV Push every 15 minutes  famotidine    Tablet 20 milliGRAM(s) Oral two times a day  furosemide   Injectable 20 milliGRAM(s) IV Push daily  glucagon  Injectable 1 milliGRAM(s) IntraMuscular once  heparin   Injectable 5000 Unit(s) SubCutaneous every 8 hours  insulin glargine Injectable (LANTUS) 30 Unit(s) SubCutaneous every morning  insulin lispro (ADMELOG) corrective regimen sliding scale   SubCutaneous three times a day before meals  insulin lispro Injectable (ADMELOG) 10 Unit(s) SubCutaneous three times a day before meals  metoprolol tartrate 25 milliGRAM(s) Oral two times a day  polyethylene glycol 3350 17 Gram(s) Oral daily  senna 2 Tablet(s) Oral at bedtime    MEDICATIONS  (PRN):  dextrose Oral Gel 15 Gram(s) Oral once PRN Blood Glucose LESS THAN 70 milliGRAM(s)/deciliter    HEPARIN:  [x] YES [] NO  Dose: 5000 UNITS/HR UNITS Q8H  LOVENOX:[] YES [x] NO  Dose: XX mg Q24H  COUMADIN: []  YES [x] NO  Dose: XX mg  Q24H  SCD's: YES b/l  GI Prophylaxis: Protonix [], Pepcid [x], None [], (Contra-indication:.....)    Post-Op Beta-Blockers: Yes [x], No[], If No, then contraindication:  Post-Op Aspirin: Yes [x],  No [], If No, then contraindication:  Post-Op Statin: Yes [x], No[], If No, then contraindication:  Allergies    No Known Allergies    Intolerances      Ambulation/Activity Status:  - Ambulates with assistance    Assessment/Plan:  59y Male status-post AVR POD 3  - Case and plan discussed with CTU Intensivist and CT Surgeon - Dr. Montemayor/Timoteo/Rosalee  - Continue CTU supportive care    - Continue DVT/GI prophylaxis  - Incentive Spirometry 10 times an hour  - Continue to advance physical activity as tolerated and continue PT/OT as directed  - Keep epicardial wires  - Opthalmology consult for vision changes  1. Aortic stenosis s/p AVR: Continue ASA & BB. Pain control as needed.   2. HTN: PO BB  3. Post-op A. Fib: afib last night, amio given and converted to NSR.   4. COPD/Hypoxia: currently on supplemental O2, duonebs ordered. encourage cough and deep breathing  5. DM/Glucose Control: inuslin gtt d/c'd. Lantus 30U AM, Lispro 10 prandial, RISS coverage      Social Service Disposition:     OPERATIVE PROCEDURE(s): AVR               POD #3                       59yMale    SURGEON(s): ERIC Montemayor    SUBJECTIVE ASSESSMENT: Pt seen and examined at bedside. Pt went into Afib last night and loaded with amiodarone. Patient also endorsed seeing spots, floaters and visual hallucinations Pt currently denies visual hallucinations, vision changes, lightheadedness, dizziness, gait imbalance, dysphagia and slurred speech.  Vital Signs Last 24 Hrs  T(F): 99.1 (17 Jan 2025 08:00), Max: 99.1 (17 Jan 2025 08:00)  HR: 79 (17 Jan 2025 10:00) (71 - 122)  BP: 118/70 (17 Jan 2025 10:00) (88/58 - 148/79)  BP(mean): 88 (17 Jan 2025 10:00) (68 - 106)  RR: 7 (17 Jan 2025 10:00) (7 - 34)  SpO2: 95% (17 Jan 2025 10:00) (93% - 97%)      I&O's Detail    16 Jan 2025 07:01  -  17 Jan 2025 07:00  --------------------------------------------------------  IN:    Amiodarone: 66.6 mL    Amiodarone: 266.4 mL    Amiodarone: 33.4 mL    Insulin: 15 mL    IV PiggyBack: 350 mL    Oral Fluid: 560 mL    sodium chloride 0.9%: 80 mL  Total IN: 1371.4 mL    OUT:    Chest Tube (mL): 40 mL    Indwelling Catheter - Urethral (mL): 250 mL    Voided (mL): 1400 mL  Total OUT: 1690 mL        Net:   I&O's Detail    15 [place] 2025 07:01  -  16 Jan 2025 07:00  --------------------------------------------------------  Total NET: 1983.2 mL      16 Jan 2025 07:01  -  17 Jan 2025 07:00  --------------------------------------------------------  Total NET: -318.6 mL        CAPILLARY BLOOD GLUCOSE      POCT Blood Glucose.: 169 mg/dL (17 Jan 2025 07:43)  POCT Blood Glucose.: 153 mg/dL (16 Jan 2025 21:45)  POCT Blood Glucose.: 166 mg/dL (16 Jan 2025 16:31)  POCT Blood Glucose.: 136 mg/dL (16 Jan 2025 11:17)    Physical Exam:  General: NAD; A&Ox3  Cardiac: S1/S2, RRR, no murmur  Lungs: unlabored respirations, CTA b/l  Abdomen: Soft/NT/ND  Sternum: Intact, no click, incision healing well with no drainage  Incisions: Incisions clean/dry/intact  Extremities: No edema b/l lower extremities; good capillary refill; no cyanosis; palpable pedal pulses    Central Venous Catheter: Yes[x]  No[] , If Yes indication:           Day #3  Juarez Catheter: Yes  [] , No  [x] , If yes indication:                      Day #  NGT: Yes [] No [x] ,    If Yes Placement:                                     Day #  EPICARDIAL WIRES:  [x] YES [] NO                                              Day #3  BOWEL MOVEMENT:  [x] YES [] NO, If No, Timing since last BM:      Day #  CHEST TUBE(Left/Right):  [] YES [x] NO, If yes -  AIR LEAKS:  [] YES [] NO        LABS:                        10.9[L]  10.00 )-----------( 110[L]    ( 17 Jan 2025 01:41 )             32.0[L]                        12.2[L]  12.44[H] )-----------( 106[L]    ( 16 Jan 2025 01:55 )             35.3[L]    01-17    133[L]  |  100  |  15  ----------------------------<  139[H]  4.0   |  23  |  0.8  01-16    132[L]  |  102  |  15  ----------------------------<  127[H]  4.3   |  19  |  0.8    Ca    8.0[L]      17 Jan 2025 01:41  Mg     2.1     01-17    TPro  5.4[L] [6.0 - 8.0]  /  Alb  3.4[L] [3.5 - 5.2]  /  TBili  0.8 [0.2 - 1.2]  /  DBili  x   /  AST  51[H] [0 - 41]  /  ALT  27 [0 - 41]  /  AlkPhos  73 [30 - 115]  01-17    PT/INR - ( 16 Jan 2025 01:55 )   PT: ;   INR: 1.16 ratio         PTT - ( 16 Jan 2025 01:55 )  PTT:26.6 sec  Urinalysis Basic - ( 17 Jan 2025 01:41 )    Color: x / Appearance: x / SG: x / pH: x  Gluc: 139 mg/dL / Ketone: x  / Bili: x / Urobili: x   Blood: x / Protein: x / Nitrite: x   Leuk Esterase: x / RBC: x / WBC x   Sq Epi: x / Non Sq Epi: x / Bacteria: x        RADIOLOGY & ADDITIONAL TESTS:  CXR:< from: Xray Chest 1 View- PORTABLE-Routine (01.17.25 @ 07:34) >  Findings:    Support devices: Unchanged right IJ vascular sheath.    Cardiac/mediastinum/hilum: Post median sternotomy. Stable cardiac   silhouette. Post aortic valve replacement.    Lung parenchyma/Pleura: Unchanged elevated right hemidiaphragm. No   evidence of pneumothorax.    Skeleton/soft tissues: Unchanged.    Impression:    No radiographic evidence of acute cardiopulmonary disease.    < end of copied text >    EKG:< from: 12 Lead ECG (01.17.25 @ 07:52) >  Ventricular Rate 79 BPM    Atrial Rate 79 BPM    P-R Interval 180 ms    QRS Duration 90 ms    Q-T Interval 418 ms    QTC Calculation(Bazett) 479 ms    P Axis 22 degrees    R Axis 21 degrees    T Axis 204 degrees    Diagnosis Line Normal sinus rhythm  Septal infarct , age undetermined  T wave abnormality, consider inferolateral ischemia  Abnormal ECG    < end of copied text >    MEDICATIONS  (STANDING):  albuterol/ipratropium for Nebulization 3 milliLiter(s) Nebulizer every 6 hours  aspirin enteric coated 81 milliGRAM(s) Oral daily  bisacodyl Suppository 10 milliGRAM(s) Rectal once  chlorhexidine 2% Cloths 1 Application(s) Topical daily  dextrose 5%. 1000 milliLiter(s) (50 mL/Hr) IV Continuous <Continuous>  dextrose 5%. 1000 milliLiter(s) (100 mL/Hr) IV Continuous <Continuous>  dextrose 50% Injectable 50 milliLiter(s) IV Push every 15 minutes  dextrose 50% Injectable 25 milliLiter(s) IV Push every 15 minutes  famotidine    Tablet 20 milliGRAM(s) Oral two times a day  furosemide   Injectable 20 milliGRAM(s) IV Push daily  glucagon  Injectable 1 milliGRAM(s) IntraMuscular once  heparin   Injectable 5000 Unit(s) SubCutaneous every 8 hours  insulin glargine Injectable (LANTUS) 30 Unit(s) SubCutaneous every morning  insulin lispro (ADMELOG) corrective regimen sliding scale   SubCutaneous three times a day before meals  insulin lispro Injectable (ADMELOG) 10 Unit(s) SubCutaneous three times a day before meals  metoprolol tartrate 25 milliGRAM(s) Oral two times a day  polyethylene glycol 3350 17 Gram(s) Oral daily  senna 2 Tablet(s) Oral at bedtime    MEDICATIONS  (PRN):  dextrose Oral Gel 15 Gram(s) Oral once PRN Blood Glucose LESS THAN 70 milliGRAM(s)/deciliter    HEPARIN:  [x] YES [] NO  Dose: 5000 UNITS/HR UNITS Q8H  LOVENOX:[] YES [x] NO  Dose: XX mg Q24H  COUMADIN: []  YES [x] NO  Dose: XX mg  Q24H  SCD's: YES b/l  GI Prophylaxis: Protonix [], Pepcid [x], None [], (Contra-indication:.....)    Post-Op Beta-Blockers: Yes [x], No[], If No, then contraindication:  Post-Op Aspirin: Yes [x],  No [], If No, then contraindication:  Post-Op Statin: Yes [x], No[], If No, then contraindication:  Allergies    No Known Allergies    Intolerances      Ambulation/Activity Status:  - Ambulates with assistance    Assessment/Plan:  59y Male status-post AVR POD 3  - Case and plan discussed with CTU Intensivist and CT Surgeon - Dr. Montemayor/Timoteo/Rosalee  - Continue CTU supportive care    - Continue DVT/GI prophylaxis  - Incentive Spirometry 10 times an hour  - Continue to advance physical activity as tolerated and continue PT/OT as directed  - Keep epicardial wires today and hold heparin in the am so they can be removed then  1. Aortic stenosis s/p AVR: Continue ASA & BB. Pain control as needed.   2. HTN: PO BB  3. Post-op A. Fib: afib last night, amio given and converted to NSR.   4. COPD/Hypoxia: currently on supplemental O2, duonebs ordered. encourage cough and deep breathing  5. DM/Glucose Control: inuslin gtt d/c'd. Lantus 30U AM, Lispro 10 prandial, RISS coverage  visual changes discussed with Dr. Montemayor who believes symptoms are more consistent with normal post op changes and does not feel any other neuro workup is required at this time- pt currently is without complaints  resume Amio for AF ppx as per Dr Montemayor being his visual symptoms are highly unlikely to be secondary to Amio    Social Service Disposition:  home in a few days

## 2025-01-17 NOTE — PROGRESS NOTE ADULT - SUBJECTIVE AND OBJECTIVE BOX
PEDRITO DIAZ  MRN#: [id number]  Subjective:  Patient was seen and evalauted on AM rounds offerring no specific compliants at this time.    OBJECTIVE:  ICU Vital Signs Last 24 Hrs  T(C): 37.3 (2025 08:00), Max: 37.3 (2025 08:00)  T(F): 99.1 (2025 08:00), Max: 99.1 (2025 08:00)  HR: 79 (2025 10:00) (71 - 122)  BP: 118/70 (2025 10:00) (88/58 - 148/79)  BP(mean): 88 (2025 10:00) (68 - 106)  ABP: --  ABP(mean): --  RR: 7 (2025 10:00) (7 - 34)  SpO2: 95% (2025 10:00) (93% - 97%)    O2 Parameters below as of 2025 07:00  Patient On (Oxygen Delivery Method): nasal cannula  O2 Flow (L/min): 3          16 @ 07:01  -  -17 @ 07:00  --------------------------------------------------------  IN: 1371.4 mL / OUT: 1690 mL / NET: -318.6 mL      CAPILLARY BLOOD GLUCOSE      POCT Blood Glucose.: 182 mg/dL (2025 11:07)      PHYSICAL EXAM:Daily     Daily Weight in k (2025 06:00)  General: WN/WD NAD    HEENT:     + NCAT  + EOMI  - Conjuctival edema   - Icterus   - Thrush   - ETT  - NGT/OGT    Neck:         + FROM    - JVD     - Nodes     - Masses    + Mid-line trachea   - Tracheostomy    Chest:         - Sternal click  - Sternal drainage  + Pacing wires  - Chest tubes  - SubQ emphysema    Lungs:          + CTA   - Rhonchi    - Rales    - Wheezing     - Decreased BS   - Dullness R L    Cardiac:       + S1 + S2    + RRR   - Irregular   - S3  - S4    - Murmurs   - Rub   - Hamman’s sign     Abdomen:    + BS     + Soft    + Non-tender     - Distended    - Organomegaly  - PEG    Extremities:   - Cyanosis U/L   - Clubbing  U/L  - LE/UE Edema   + Capillary refill    + Pulses     Neuro:        + Awake   +  Alert   - Confused   - Lethargic   - Sedated   - Generalized Weakness    Skin:        - Rashes    - Erythema   + Normal incisions   + IV sites intact  - Sacral decubitus    HOSPITAL MEDICATIONS:  MEDICATIONS  (STANDING):  albuterol/ipratropium for Nebulization 3 milliLiter(s) Nebulizer every 6 hours  aspirin enteric coated 81 milliGRAM(s) Oral daily  bisacodyl Suppository 10 milliGRAM(s) Rectal once  chlorhexidine 2% Cloths 1 Application(s) Topical daily  dextrose 5%. 1000 milliLiter(s) (50 mL/Hr) IV Continuous <Continuous>  dextrose 5%. 1000 milliLiter(s) (100 mL/Hr) IV Continuous <Continuous>  dextrose 50% Injectable 50 milliLiter(s) IV Push every 15 minutes  dextrose 50% Injectable 25 milliLiter(s) IV Push every 15 minutes  famotidine    Tablet 20 milliGRAM(s) Oral two times a day  furosemide   Injectable 20 milliGRAM(s) IV Push daily  glucagon  Injectable 1 milliGRAM(s) IntraMuscular once  heparin   Injectable 5000 Unit(s) SubCutaneous every 8 hours  insulin glargine Injectable (LANTUS) 30 Unit(s) SubCutaneous every morning  insulin lispro (ADMELOG) corrective regimen sliding scale   SubCutaneous three times a day before meals  insulin lispro Injectable (ADMELOG) 10 Unit(s) SubCutaneous three times a day before meals  metoprolol tartrate 25 milliGRAM(s) Oral two times a day  polyethylene glycol 3350 17 Gram(s) Oral daily  senna 2 Tablet(s) Oral at bedtime    MEDICATIONS  (PRN):  dextrose Oral Gel 15 Gram(s) Oral once PRN Blood Glucose LESS THAN 70 milliGRAM(s)/deciliter      LABS:                        10.9   10.00 )-----------( 110      ( 2025 01:41 )             32.0    01-17    133[L]  |  100  |  15  ----------------------------<  139[H]  4.0   |  23  |  0.8    Ca    8.0[L]      2025 01:41  Mg     2.1         TPro  5.4[L]  /  Alb  3.4[L]  /  TBili  0.8  /  DBili  x   /  AST  51[H]  /  ALT  27  /  AlkPhos  73      PT/INR - ( 2025 01:55 )   PT: 13.70 sec;   INR: 1.16 ratio         PTT - ( 2025 01:55 )  PTT:26.6 sec LIVER FUNCTIONS - ( 2025 01:41 )  Alb: 3.4 g/dL / Pro: 5.4 g/dL / ALK PHOS: 73 U/L / ALT: 27 U/L / AST: 51 U/L / GGT: x           Urinalysis Basic - ( 2025 01:41 )    Color: x / Appearance: x / SG: x / pH: x  Gluc: 139 mg/dL / Ketone: x  / Bili: x / Urobili: x   Blood: x / Protein: x / Nitrite: x   Leuk Esterase: x / RBC: x / WBC x   Sq Epi: x / Non Sq Epi: x / Bacteria: x        RADIOLOGY:  X Reviewed and interpreted by me: (-) PTX, (-) focal opacities    CARDIOPULMONARY DYSFUNCTION  - Respiratory status required supplemental oxygen & the following of continuous pulse oximetry for support & to prevent decompensation  - Continued early mobilization as tolerated  - Addressed analgesic regimen to optimize function    PREVENTION-PROPHYLAXIS  - ASA continued for thromboembolism prophylaxis  - Heparin continued for VTE prophylaxis in addition to Venodyne boots  - Pepcid maintained for GI bleeding prophylaxis  - Lopressor continued for atrial fibrillation prophylaxis  - Metabolic stability & infection prophylaxis required review and adjustment of regular Insulin sliding scale and gylcemic regimen while following serial glucose levels to help achieve & maintain euglycemia  - Reviewed & addressed surgical site infection prophylaxis regimen
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
## 2025-01-17 NOTE — CONSULT NOTE ADULT - ASSESSMENT
ASSESSMENT  1) Subjective Visual Disturbance vs Aura without Migraine  -- no detachments/holes/breaks/tears OU    RECOMMENDATIONS  -- Continue care as directed by primary team  -- No intervention necessary  -- Pt can follow up as an outpatient in ~1-2 months (for annual exam) with Dr. Warner (primary eye care provider) at [facility] ([address] [facility] [address] [place] [address] Phone #[phone]) or any other eye care provider of pt's choice  -- Reconsult sooner as needed (Spectra [phone])
Mr. PEDRITO DIAZ 59 year M, never-smoker, with PMHx of HTN, DMII, Severe AS. Pt had CTA revealing a Tri-Commissural Bicuspid Aortic Valve. On 01/14/25, he underwent aortic valve replacement. Neurology was consulted PODx3 for visual hallucinations.     Patient has been having visual hallucinations for multiple days. On PODx1, he say some pink dots moving up the wall that lasted for 30 mins. On PODx2, he saw similar pink dots that then turned blue moving up the wall as he followed them with his gaze, and he saw his blanket moving as he was sitting on his chair - describes it as air blowing under the blanket. This lasted for hours. He also saw the same pink dots today that lasted for 30 mins. He has had a total of 5 episodes of these visual hallucinations. He reports that 2 years ago he had "kaleidoscope vision" that was possibly diagnosed as silent migraines - he did not have headaches. He currently denies headaches or dizziness. He denies any neurological or psychiatric history. On exam, patient has a lower facial asymmetry that corrects when he smiles. The left arm and leg are slightly weaker than the right side. Otherwise, the neurological exam is unremarkable.     Plan is to obtain non-con CTH to rule out neurovascular complications. Unlikely post-op delirium as patient is AOx3, memory and attention intact, displaying normal mental status exam.     Recommendations:   - Non-contrast CT head

## 2025-01-18 LAB
ALBUMIN SERPL ELPH-MCNC: 3.3 G/DL — LOW (ref 3.5–5.2)
ALP SERPL-CCNC: 62 U/L — SIGNIFICANT CHANGE UP (ref 30–115)
ALT FLD-CCNC: 22 U/L — SIGNIFICANT CHANGE UP (ref 0–41)
ANION GAP SERPL CALC-SCNC: 10 MMOL/L — SIGNIFICANT CHANGE UP (ref 7–14)
AST SERPL-CCNC: 29 U/L — SIGNIFICANT CHANGE UP (ref 0–41)
BASOPHILS # BLD AUTO: 0.03 K/UL — SIGNIFICANT CHANGE UP (ref 0–0.2)
BASOPHILS NFR BLD AUTO: 0.3 % — SIGNIFICANT CHANGE UP (ref 0–1)
BILIRUB SERPL-MCNC: 0.9 MG/DL — SIGNIFICANT CHANGE UP (ref 0.2–1.2)
BUN SERPL-MCNC: 17 MG/DL — SIGNIFICANT CHANGE UP (ref 10–20)
CALCIUM SERPL-MCNC: 8.2 MG/DL — LOW (ref 8.4–10.5)
CHLORIDE SERPL-SCNC: 101 MMOL/L — SIGNIFICANT CHANGE UP (ref 98–110)
CO2 SERPL-SCNC: 25 MMOL/L — SIGNIFICANT CHANGE UP (ref 17–32)
CREAT SERPL-MCNC: 0.8 MG/DL — SIGNIFICANT CHANGE UP (ref 0.7–1.5)
EGFR: 101 ML/MIN/1.73M2 — SIGNIFICANT CHANGE UP
EOSINOPHIL # BLD AUTO: 0.09 K/UL — SIGNIFICANT CHANGE UP (ref 0–0.7)
EOSINOPHIL NFR BLD AUTO: 1 % — SIGNIFICANT CHANGE UP (ref 0–8)
GLUCOSE BLDC GLUCOMTR-MCNC: 135 MG/DL — HIGH (ref 70–99)
GLUCOSE BLDC GLUCOMTR-MCNC: 145 MG/DL — HIGH (ref 70–99)
GLUCOSE BLDC GLUCOMTR-MCNC: 145 MG/DL — HIGH (ref 70–99)
GLUCOSE BLDC GLUCOMTR-MCNC: 203 MG/DL — HIGH (ref 70–99)
GLUCOSE SERPL-MCNC: 124 MG/DL — HIGH (ref 70–99)
HCT VFR BLD CALC: 34.5 % — LOW (ref 42–52)
HGB BLD-MCNC: 11.8 G/DL — LOW (ref 14–18)
IMM GRANULOCYTES NFR BLD AUTO: 0.7 % — HIGH (ref 0.1–0.3)
LYMPHOCYTES # BLD AUTO: 1.58 K/UL — SIGNIFICANT CHANGE UP (ref 1.2–3.4)
LYMPHOCYTES # BLD AUTO: 18.1 % — LOW (ref 20.5–51.1)
MAGNESIUM SERPL-MCNC: 2.1 MG/DL — SIGNIFICANT CHANGE UP (ref 1.8–2.4)
MCHC RBC-ENTMCNC: 31 PG — SIGNIFICANT CHANGE UP (ref 27–31)
MCHC RBC-ENTMCNC: 34.2 G/DL — SIGNIFICANT CHANGE UP (ref 32–37)
MCV RBC AUTO: 90.6 FL — SIGNIFICANT CHANGE UP (ref 80–94)
MONOCYTES # BLD AUTO: 1.28 K/UL — HIGH (ref 0.1–0.6)
MONOCYTES NFR BLD AUTO: 14.6 % — HIGH (ref 1.7–9.3)
NEUTROPHILS # BLD AUTO: 5.7 K/UL — SIGNIFICANT CHANGE UP (ref 1.4–6.5)
NEUTROPHILS NFR BLD AUTO: 65.3 % — SIGNIFICANT CHANGE UP (ref 42.2–75.2)
NRBC # BLD: 0 /100 WBCS — SIGNIFICANT CHANGE UP (ref 0–0)
NRBC BLD-RTO: 0 /100 WBCS — SIGNIFICANT CHANGE UP (ref 0–0)
PLATELET # BLD AUTO: 150 K/UL — SIGNIFICANT CHANGE UP (ref 130–400)
PMV BLD: 10.3 FL — SIGNIFICANT CHANGE UP (ref 7.4–10.4)
POTASSIUM SERPL-MCNC: 3.7 MMOL/L — SIGNIFICANT CHANGE UP (ref 3.5–5)
POTASSIUM SERPL-SCNC: 3.7 MMOL/L — SIGNIFICANT CHANGE UP (ref 3.5–5)
PROT SERPL-MCNC: 5.3 G/DL — LOW (ref 6–8)
RBC # BLD: 3.81 M/UL — LOW (ref 4.7–6.1)
RBC # FLD: 13.1 % — SIGNIFICANT CHANGE UP (ref 11.5–14.5)
SODIUM SERPL-SCNC: 136 MMOL/L — SIGNIFICANT CHANGE UP (ref 135–146)
WBC # BLD: 8.74 K/UL — SIGNIFICANT CHANGE UP (ref 4.8–10.8)
WBC # FLD AUTO: 8.74 K/UL — SIGNIFICANT CHANGE UP (ref 4.8–10.8)

## 2025-01-18 PROCEDURE — 93010 ELECTROCARDIOGRAM REPORT: CPT

## 2025-01-18 PROCEDURE — 71045 X-RAY EXAM CHEST 1 VIEW: CPT | Mod: 26

## 2025-01-18 PROCEDURE — 99232 SBSQ HOSP IP/OBS MODERATE 35: CPT

## 2025-01-18 RX ORDER — POTASSIUM CHLORIDE 750 MG/1
40 TABLET, EXTENDED RELEASE ORAL ONCE
Refills: 0 | Status: COMPLETED | OUTPATIENT
Start: 2025-01-18 | End: 2025-01-18

## 2025-01-18 RX ADMIN — POTASSIUM CHLORIDE 40 MILLIEQUIVALENT(S): 750 TABLET, EXTENDED RELEASE ORAL at 11:47

## 2025-01-18 RX ADMIN — Medication 10 UNIT(S): at 07:40

## 2025-01-18 RX ADMIN — Medication 10 UNIT(S): at 11:47

## 2025-01-18 RX ADMIN — Medication 5000 UNIT(S): at 13:12

## 2025-01-18 RX ADMIN — Medication 10 UNIT(S): at 16:36

## 2025-01-18 RX ADMIN — IPRATROPIUM BROMIDE AND ALBUTEROL SULFATE 3 MILLILITER(S): .5; 2.5 SOLUTION RESPIRATORY (INHALATION) at 13:41

## 2025-01-18 RX ADMIN — IPRATROPIUM BROMIDE AND ALBUTEROL SULFATE 3 MILLILITER(S): .5; 2.5 SOLUTION RESPIRATORY (INHALATION) at 08:11

## 2025-01-18 RX ADMIN — FAMOTIDINE 20 MILLIGRAM(S): 10 INJECTION INTRAVENOUS at 06:35

## 2025-01-18 RX ADMIN — INSULIN GLARGINE-YFGN 30 UNIT(S): 100 INJECTION, SOLUTION SUBCUTANEOUS at 07:41

## 2025-01-18 RX ADMIN — Medication 2: at 11:48

## 2025-01-18 RX ADMIN — Medication 25 MILLIGRAM(S): at 17:03

## 2025-01-18 RX ADMIN — IPRATROPIUM BROMIDE AND ALBUTEROL SULFATE 3 MILLILITER(S): .5; 2.5 SOLUTION RESPIRATORY (INHALATION) at 19:59

## 2025-01-18 RX ADMIN — AMIODARONE HYDROCHLORIDE 200 MILLIGRAM(S): 50 INJECTION, SOLUTION INTRAVENOUS at 17:04

## 2025-01-18 RX ADMIN — Medication 25 MILLIGRAM(S): at 06:35

## 2025-01-18 RX ADMIN — FAMOTIDINE 20 MILLIGRAM(S): 10 INJECTION INTRAVENOUS at 17:03

## 2025-01-18 RX ADMIN — AMIODARONE HYDROCHLORIDE 200 MILLIGRAM(S): 50 INJECTION, SOLUTION INTRAVENOUS at 06:37

## 2025-01-18 RX ADMIN — Medication 20 MILLIGRAM(S): at 06:36

## 2025-01-18 RX ADMIN — Medication 5000 UNIT(S): at 21:12

## 2025-01-18 RX ADMIN — ANTISEPTIC SURGICAL SCRUB 1 APPLICATION(S): 0.04 SOLUTION TOPICAL at 06:36

## 2025-01-18 RX ADMIN — ASPIRIN 81 MILLIGRAM(S): 81 TABLET, COATED ORAL at 11:47

## 2025-01-18 RX ADMIN — POTASSIUM CHLORIDE 40 MILLIEQUIVALENT(S): 750 TABLET, EXTENDED RELEASE ORAL at 06:35

## 2025-01-18 NOTE — PROGRESS NOTE ADULT - SUBJECTIVE AND OBJECTIVE BOX
OPERATIVE PROCEDURE(s):       avr         POD # 4    SURGEON(s): rose      SUBJECTIVE ASSESSMENT:     Vital Signs Last 24 Hrs  T(C): 36.7 (18 Jan 2025 04:00), Max: 37.5 (17 Jan 2025 20:00)  T(F): 98 (18 Jan 2025 04:00), Max: 99.5 (17 Jan 2025 20:00)  HR: 75 (18 Jan 2025 07:00) (75 - 87)  BP: 127/95 (18 Jan 2025 07:00) (115/66 - 139/74)  BP(mean): 105 (18 Jan 2025 07:00) (83 - 105)  RR: 20 (18 Jan 2025 07:00) (7 - 23)  SpO2: 94% (18 Jan 2025 07:00) (93% - 97%)    Parameters below as of 18 Jan 2025 07:00  Patient On (Oxygen Delivery Method): nasal cannula  O2 Flow (L/min): 2    01-17-25 @ 07:01  -  01-18-25 @ 07:00  --------------------------------------------------------  IN: 840 mL / OUT: 2150 mL / NET: -1310 mL    Physical Exam:  General: NAD; A&Ox3  Cardiac: S1/S2, RRR, no murmur  Lungs: unlabored respirations, CTA b/l  Abdomen: Soft/NT/ND  Sternum: Intact, no click, incision healing well with no drainage  Incisions: Incisions clean/dry/intact  Extremities: No edema b/l lower extremities; good capillary refill; no cyanosis; palpable pedal pulses    LABS:                        11.8   8.74  )-----------( 150      ( 18 Jan 2025 01:43 )             34.5     COUMADIN:   [ ] YES [ ] NO      01-18    136  |  101  |  17  ----------------------------<  124[H]  3.7   |  25  |  0.8    Ca    8.2[L]      18 Jan 2025 01:43  Mg     2.1     01-18    TPro  5.3[L]  /  Alb  3.3[L]  /  TBili  0.9  /  DBili  x   /  AST  29  /  ALT  22  /  AlkPhos  62  01-18    Urinalysis Basic - ( 18 Jan 2025 01:43 )    Color: x / Appearance: x / SG: x / pH: x  Gluc: 124 mg/dL / Ketone: x  / Bili: x / Urobili: x   Blood: x / Protein: x / Nitrite: x   Leuk Esterase: x / RBC: x / WBC x   Sq Epi: x / Non Sq Epi: x / Bacteria: x        MEDICATIONS  (STANDING):  albuterol/ipratropium for Nebulization 3 milliLiter(s) Nebulizer every 6 hours  aMIOdarone    Tablet 200 milliGRAM(s) Oral two times a day  aspirin enteric coated 81 milliGRAM(s) Oral daily  bisacodyl Suppository 10 milliGRAM(s) Rectal once  chlorhexidine 2% Cloths 1 Application(s) Topical daily  dextrose 5%. 1000 milliLiter(s) (50 mL/Hr) IV Continuous <Continuous>  dextrose 5%. 1000 milliLiter(s) (100 mL/Hr) IV Continuous <Continuous>  dextrose 50% Injectable 50 milliLiter(s) IV Push every 15 minutes  dextrose 50% Injectable 25 milliLiter(s) IV Push every 15 minutes  famotidine    Tablet 20 milliGRAM(s) Oral two times a day  furosemide   Injectable 20 milliGRAM(s) IV Push daily  glucagon  Injectable 1 milliGRAM(s) IntraMuscular once  heparin   Injectable 5000 Unit(s) SubCutaneous every 8 hours  insulin glargine Injectable (LANTUS) 30 Unit(s) SubCutaneous every morning  insulin lispro (ADMELOG) corrective regimen sliding scale   SubCutaneous three times a day before meals  insulin lispro Injectable (ADMELOG) 10 Unit(s) SubCutaneous three times a day before meals  metoprolol tartrate 25 milliGRAM(s) Oral two times a day  polyethylene glycol 3350 17 Gram(s) Oral daily  senna 2 Tablet(s) Oral at bedtime    MEDICATIONS  (PRN):  dextrose Oral Gel 15 Gram(s) Oral once PRN Blood Glucose LESS THAN 70 milliGRAM(s)/deciliter      Allergies    No Known Allergies    Intolerances        Ambulation/Activity Status:  amb well with pt      RADIOLOGY & ADDITIONAL TESTS:  ACC: [id number] EXAM:  XR CHEST PORTABLE ROUTINE 1V   ORDERED BY: JEROME HARDY     PROCEDURE DATE:  01/18/2025          INTERPRETATION:  STUDY INDICATION: Shortness of Breath    TECHNIQUE:  Portable frontal view of the chest obtained.    COMPARISON: 1/17/2025    FINDINGS/  IMPRESSION:    Unchanged right IJ vascular sheath.    No focal consolidation, pneumothorax or pleural effusion.    Stable cardiomediastinal silhouette.    Unchanged osseous structures.    --- End of Report ---        Assessment/Plan:  59y Male status-post AVR POD 4  - Case and plan discussed with CTU Intensivist and CT Surgeon - Dr. Montemayor/Timoteo/Rosalee  - Continue CTU supportive care    - Continue DVT/GI prophylaxis  - Incentive Spirometry 10 times an hour  - Continue to advance physical activity as tolerated and continue PT/OT as directed  - Keep epicardial wires today and hold heparin in the am so they can be removed then  1. Aortic stenosis s/p AVR: Continue ASA & BB. Pain control as needed.   2. HTN: PO BB  3. Post-op A. Fib: afib last night, amio given and converted to NSR.   4. COPD/Hypoxia: currently on supplemental O2, duonebs ordered. encourage cough and deep breathing  5. DM/Glucose Control: inuslin gtt d/c'd. Lantus 30U AM, Lispro 10 prandial, RISS coverage  visual changes discussed with Dr. Montemayor who believes symptoms are more consistent with normal post op changes and does not feel any other neuro workup is required at this time- pt currently is without complaints  resume Amio for AF ppx as per Dr Montemayor being his visual symptoms are highly unlikely to be secondary to Amio    Social Service Disposition:  home in a few days   OPERATIVE PROCEDURE(s):       avr         POD # 4    SURGEON(s): rose    SUBJECTIVE ASSESSMENT: pt is without complaints and no longer complains of any visual changes    Vital Signs Last 24 Hrs  T(C): 36.7 (18 Jan 2025 04:00), Max: 37.5 (17 Jan 2025 20:00)  T(F): 98 (18 Jan 2025 04:00), Max: 99.5 (17 Jan 2025 20:00)  HR: 75 (18 Jan 2025 07:00) (75 - 87)  BP: 127/95 (18 Jan 2025 07:00) (115/66 - 139/74)  BP(mean): 105 (18 Jan 2025 07:00) (83 - 105)  RR: 20 (18 Jan 2025 07:00) (7 - 23)  SpO2: 94% (18 Jan 2025 07:00) (93% - 97%)    Parameters below as of 18 Jan 2025 07:00  Patient On (Oxygen Delivery Method): nasal cannula  O2 Flow (L/min): 2    01-17-25 @ 07:01  -  01-18-25 @ 07:00  --------------------------------------------------------  IN: 840 mL / OUT: 2150 mL / NET: -1310 mL    Physical Exam:  General: NAD; A&Ox3  Cardiac: S1/S2, RRR, no murmur  Lungs: unlabored respirations, CTA b/l  Abdomen: Soft/NT/ND  Sternum: Intact, no click, incision healing well with no drainage  Incisions: Incisions clean/dry/intact  Extremities: No edema b/l lower extremities; good capillary refill; no cyanosis; palpable pedal pulses    LABS:                        11.8   8.74  )-----------( 150      ( 18 Jan 2025 01:43 )             34.5     COUMADIN:   [ ] YES [x ] NO      01-18    136  |  101  |  17  ----------------------------<  124[H]  3.7   |  25  |  0.8    Ca    8.2[L]      18 Jan 2025 01:43  Mg     2.1     01-18    TPro  5.3[L]  /  Alb  3.3[L]  /  TBili  0.9  /  DBili  x   /  AST  29  /  ALT  22  /  AlkPhos  62  01-18    Urinalysis Basic - ( 18 Jan 2025 01:43 )    Color: x / Appearance: x / SG: x / pH: x  Gluc: 124 mg/dL / Ketone: x  / Bili: x / Urobili: x   Blood: x / Protein: x / Nitrite: x   Leuk Esterase: x / RBC: x / WBC x   Sq Epi: x / Non Sq Epi: x / Bacteria: x        MEDICATIONS  (STANDING):  albuterol/ipratropium for Nebulization 3 milliLiter(s) Nebulizer every 6 hours  aMIOdarone    Tablet 200 milliGRAM(s) Oral two times a day  aspirin enteric coated 81 milliGRAM(s) Oral daily  bisacodyl Suppository 10 milliGRAM(s) Rectal once  chlorhexidine 2% Cloths 1 Application(s) Topical daily  dextrose 5%. 1000 milliLiter(s) (50 mL/Hr) IV Continuous <Continuous>  dextrose 5%. 1000 milliLiter(s) (100 mL/Hr) IV Continuous <Continuous>  dextrose 50% Injectable 50 milliLiter(s) IV Push every 15 minutes  dextrose 50% Injectable 25 milliLiter(s) IV Push every 15 minutes  famotidine    Tablet 20 milliGRAM(s) Oral two times a day  furosemide   Injectable 20 milliGRAM(s) IV Push daily  glucagon  Injectable 1 milliGRAM(s) IntraMuscular once  heparin   Injectable 5000 Unit(s) SubCutaneous every 8 hours  insulin glargine Injectable (LANTUS) 30 Unit(s) SubCutaneous every morning  insulin lispro (ADMELOG) corrective regimen sliding scale   SubCutaneous three times a day before meals  insulin lispro Injectable (ADMELOG) 10 Unit(s) SubCutaneous three times a day before meals  metoprolol tartrate 25 milliGRAM(s) Oral two times a day  polyethylene glycol 3350 17 Gram(s) Oral daily  senna 2 Tablet(s) Oral at bedtime    MEDICATIONS  (PRN):  dextrose Oral Gel 15 Gram(s) Oral once PRN Blood Glucose LESS THAN 70 milliGRAM(s)/deciliter      Allergies    No Known Allergies    Intolerances        Ambulation/Activity Status:  amb well with pt      RADIOLOGY & ADDITIONAL TESTS:  ACC: [id number] EXAM:  XR CHEST PORTABLE ROUTINE 1V   ORDERED BY: JEROME HARDY     PROCEDURE DATE:  01/18/2025          INTERPRETATION:  STUDY INDICATION: Shortness of Breath    TECHNIQUE:  Portable frontal view of the chest obtained.    COMPARISON: 1/17/2025    FINDINGS/  IMPRESSION:    Unchanged right IJ vascular sheath.    No focal consolidation, pneumothorax or pleural effusion.    Stable cardiomediastinal silhouette.    Unchanged osseous structures.    --- End of Report ---        Assessment/Plan:  59y Male status-post AVR POD 4  - Case and plan discussed with CTU Intensivist and CT Surgeon - Dr. Montemayor/Timoteo/Rosalee  - Continue CTU supportive care    - Continue DVT/GI prophylaxis  - Incentive Spirometry 10 times an hour  - Continue to advance physical activity as tolerated and continue PT/OT as directed  -d/c epicardial wires today  1. Aortic stenosis s/p AVR: Continue ASA & BB. Pain control as needed.   2. HTN: PO BB  3. Post-op A. Fib: afib last night, amio given and converted to NSR.   4. COPD/Hypoxia: currently on supplemental O2, duonebs ordered. encourage cough and deep breathing  5. DM/Glucose Control: inuslin gtt d/c'd. Lantus 30U AM, Lispro 10 prandial, RISS coverage    Social Service Disposition:  home poss tomorrow

## 2025-01-18 NOTE — PROGRESS NOTE ADULT - SUBJECTIVE AND OBJECTIVE BOX
OPERATIVE PROCEDURE(s):       AVR POD # 4    SUBJECTIVE ASSESSMENT:     Vital Signs Last 24 Hrs  T(C): 36.7 (18 Jan 2025 04:00), Max: 37.5 (17 Jan 2025 20:00)  T(F): 98 (18 Jan 2025 04:00), Max: 99.5 (17 Jan 2025 20:00)  HR: 75 (18 Jan 2025 07:00) (75 - 87)  BP: 127/95 (18 Jan 2025 07:00) (115/66 - 139/74)  BP(mean): 105 (18 Jan 2025 07:00) (83 - 105)  RR: 20 (18 Jan 2025 07:00) (7 - 23)  SpO2: 94% (18 Jan 2025 07:00) (93% - 97%)    Parameters below as of 18 Jan 2025 07:00  Patient On (Oxygen Delivery Method): nasal cannula  O2 Flow (L/min): 2    01-17-25 @ 07:01  -  01-18-25 @ 07:00  --------------------------------------------------------  IN: 840 mL / OUT: 2150 mL / NET: -1310 mL    Physical Exam:  General: NAD; A&Ox3  Cardiac: S1/S2, RRR, no murmur  Lungs: unlabored respirations, CTA b/l  Abdomen: Soft/NT/ND  Sternum: Intact, no click, incision healing well with no drainage  Incisions: Incisions clean/dry/intact  Extremities: No edema b/l lower extremities; good capillary refill; no cyanosis; palpable pedal pulses    LABS:                        11.8   8.74  )-----------( 150      ( 18 Jan 2025 01:43 )             34.5     COUMADIN:   [ ] YES [ ] NO      01-18    136  |  101  |  17  ----------------------------<  124[H]  3.7   |  25  |  0.8    Ca    8.2[L]      18 Jan 2025 01:43  Mg     2.1     01-18    TPro  5.3[L]  /  Alb  3.3[L]  /  TBili  0.9  /  DBili  x   /  AST  29  /  ALT  22  /  AlkPhos  62  01-18    Urinalysis Basic - ( 18 Jan 2025 01:43 )    Color: x / Appearance: x / SG: x / pH: x  Gluc: 124 mg/dL / Ketone: x  / Bili: x / Urobili: x   Blood: x / Protein: x / Nitrite: x   Leuk Esterase: x / RBC: x / WBC x   Sq Epi: x / Non Sq Epi: x / Bacteria: x        MEDICATIONS  (STANDING):  albuterol/ipratropium for Nebulization 3 milliLiter(s) Nebulizer every 6 hours  aMIOdarone    Tablet 200 milliGRAM(s) Oral two times a day  aspirin enteric coated 81 milliGRAM(s) Oral daily  bisacodyl Suppository 10 milliGRAM(s) Rectal once  chlorhexidine 2% Cloths 1 Application(s) Topical daily  dextrose 5%. 1000 milliLiter(s) (50 mL/Hr) IV Continuous <Continuous>  dextrose 5%. 1000 milliLiter(s) (100 mL/Hr) IV Continuous <Continuous>  dextrose 50% Injectable 50 milliLiter(s) IV Push every 15 minutes  dextrose 50% Injectable 25 milliLiter(s) IV Push every 15 minutes  famotidine    Tablet 20 milliGRAM(s) Oral two times a day  furosemide   Injectable 20 milliGRAM(s) IV Push daily  glucagon  Injectable 1 milliGRAM(s) IntraMuscular once  heparin   Injectable 5000 Unit(s) SubCutaneous every 8 hours  insulin glargine Injectable (LANTUS) 30 Unit(s) SubCutaneous every morning  insulin lispro (ADMELOG) corrective regimen sliding scale   SubCutaneous three times a day before meals  insulin lispro Injectable (ADMELOG) 10 Unit(s) SubCutaneous three times a day before meals  metoprolol tartrate 25 milliGRAM(s) Oral two times a day  polyethylene glycol 3350 17 Gram(s) Oral daily  senna 2 Tablet(s) Oral at bedtime    MEDICATIONS  (PRN):  dextrose Oral Gel 15 Gram(s) Oral once PRN Blood Glucose LESS THAN 70 milliGRAM(s)/deciliter      Allergies    No Known Allergies    Intolerances        Ambulation/Activity Status:  ambulates well with physical therapy      RADIOLOGY & ADDITIONAL TESTS:  ACC: [id number] EXAM:  XR CHEST PORTABLE ROUTINE 1V   ORDERED BY: JEROME HARDY     PROCEDURE DATE:  01/18/2025          INTERPRETATION:  STUDY INDICATION: Shortness of Breath    TECHNIQUE:  Portable frontal view of the chest obtained.    COMPARISON: 1/17/2025    FINDINGS/  IMPRESSION:    Unchanged right IJ vascular sheath.    No focal consolidation, pneumothorax or pleural effusion.    Stable cardiomediastinal silhouette.    Unchanged osseous structures.    --- End of Report ---        Assessment/Plan:  59y Male status-post AVR POD 4  - Case and plan discussed with CTU PA and CT Surgeon  - Continue DVT/GI prophylaxis  - Incentive Spirometry 10 times an hour  - Continue to advance physical activity as tolerated and continue PT/OT as directed  - DC epicardial wires  1. Aortic stenosis s/p AVR: Continue ASA & BB. Pain control as needed.   2. HTN: PO BB  3. Post-op A. Fib: amio PO  4. COPD/Hypoxia: currently on supplemental O2, duonebs ordered. encourage cough and deep breathing  5. DM/Glucose Control: inuslin gtt d/c'd. Lantus 30U AM, Lispro 10 prandial, RISS coverage

## 2025-01-19 VITALS
RESPIRATION RATE: 17 BRPM | DIASTOLIC BLOOD PRESSURE: 63 MMHG | HEART RATE: 73 BPM | SYSTOLIC BLOOD PRESSURE: 121 MMHG | OXYGEN SATURATION: 96 %

## 2025-01-19 LAB
ALBUMIN SERPL ELPH-MCNC: 3.2 G/DL — LOW (ref 3.5–5.2)
ALP SERPL-CCNC: 61 U/L — SIGNIFICANT CHANGE UP (ref 30–115)
ALT FLD-CCNC: 19 U/L — SIGNIFICANT CHANGE UP (ref 0–41)
ANION GAP SERPL CALC-SCNC: 11 MMOL/L — SIGNIFICANT CHANGE UP (ref 7–14)
AST SERPL-CCNC: 24 U/L — SIGNIFICANT CHANGE UP (ref 0–41)
BASOPHILS # BLD AUTO: 0.03 K/UL — SIGNIFICANT CHANGE UP (ref 0–0.2)
BASOPHILS NFR BLD AUTO: 0.4 % — SIGNIFICANT CHANGE UP (ref 0–1)
BILIRUB SERPL-MCNC: 0.6 MG/DL — SIGNIFICANT CHANGE UP (ref 0.2–1.2)
BUN SERPL-MCNC: 17 MG/DL — SIGNIFICANT CHANGE UP (ref 10–20)
CALCIUM SERPL-MCNC: 8.3 MG/DL — LOW (ref 8.4–10.5)
CHLORIDE SERPL-SCNC: 103 MMOL/L — SIGNIFICANT CHANGE UP (ref 98–110)
CO2 SERPL-SCNC: 26 MMOL/L — SIGNIFICANT CHANGE UP (ref 17–32)
CREAT SERPL-MCNC: 0.9 MG/DL — SIGNIFICANT CHANGE UP (ref 0.7–1.5)
EGFR: 97 ML/MIN/1.73M2 — SIGNIFICANT CHANGE UP
EOSINOPHIL # BLD AUTO: 0.17 K/UL — SIGNIFICANT CHANGE UP (ref 0–0.7)
EOSINOPHIL NFR BLD AUTO: 2.2 % — SIGNIFICANT CHANGE UP (ref 0–8)
GLUCOSE BLDC GLUCOMTR-MCNC: 134 MG/DL — HIGH (ref 70–99)
GLUCOSE SERPL-MCNC: 89 MG/DL — SIGNIFICANT CHANGE UP (ref 70–99)
HCT VFR BLD CALC: 36.5 % — LOW (ref 42–52)
HGB BLD-MCNC: 12.2 G/DL — LOW (ref 14–18)
IMM GRANULOCYTES NFR BLD AUTO: 0.9 % — HIGH (ref 0.1–0.3)
LYMPHOCYTES # BLD AUTO: 1.89 K/UL — SIGNIFICANT CHANGE UP (ref 1.2–3.4)
LYMPHOCYTES # BLD AUTO: 24.6 % — SIGNIFICANT CHANGE UP (ref 20.5–51.1)
MAGNESIUM SERPL-MCNC: 2.2 MG/DL — SIGNIFICANT CHANGE UP (ref 1.8–2.4)
MCHC RBC-ENTMCNC: 31 PG — SIGNIFICANT CHANGE UP (ref 27–31)
MCHC RBC-ENTMCNC: 33.4 G/DL — SIGNIFICANT CHANGE UP (ref 32–37)
MCV RBC AUTO: 92.6 FL — SIGNIFICANT CHANGE UP (ref 80–94)
MONOCYTES # BLD AUTO: 1.22 K/UL — HIGH (ref 0.1–0.6)
MONOCYTES NFR BLD AUTO: 15.9 % — HIGH (ref 1.7–9.3)
NEUTROPHILS # BLD AUTO: 4.31 K/UL — SIGNIFICANT CHANGE UP (ref 1.4–6.5)
NEUTROPHILS NFR BLD AUTO: 56 % — SIGNIFICANT CHANGE UP (ref 42.2–75.2)
NRBC # BLD: 0 /100 WBCS — SIGNIFICANT CHANGE UP (ref 0–0)
NRBC BLD-RTO: 0 /100 WBCS — SIGNIFICANT CHANGE UP (ref 0–0)
PLATELET # BLD AUTO: 182 K/UL — SIGNIFICANT CHANGE UP (ref 130–400)
PMV BLD: 10.2 FL — SIGNIFICANT CHANGE UP (ref 7.4–10.4)
POTASSIUM SERPL-MCNC: 4.1 MMOL/L — SIGNIFICANT CHANGE UP (ref 3.5–5)
POTASSIUM SERPL-SCNC: 4.1 MMOL/L — SIGNIFICANT CHANGE UP (ref 3.5–5)
PROT SERPL-MCNC: 5.5 G/DL — LOW (ref 6–8)
RBC # BLD: 3.94 M/UL — LOW (ref 4.7–6.1)
RBC # FLD: 13.2 % — SIGNIFICANT CHANGE UP (ref 11.5–14.5)
SODIUM SERPL-SCNC: 140 MMOL/L — SIGNIFICANT CHANGE UP (ref 135–146)
WBC # BLD: 7.69 K/UL — SIGNIFICANT CHANGE UP (ref 4.8–10.8)
WBC # FLD AUTO: 7.69 K/UL — SIGNIFICANT CHANGE UP (ref 4.8–10.8)

## 2025-01-19 PROCEDURE — 99232 SBSQ HOSP IP/OBS MODERATE 35: CPT | Mod: 24

## 2025-01-19 PROCEDURE — 71045 X-RAY EXAM CHEST 1 VIEW: CPT | Mod: 26

## 2025-01-19 PROCEDURE — 93010 ELECTROCARDIOGRAM REPORT: CPT

## 2025-01-19 RX ORDER — ASPIRIN 81 MG/1
1 TABLET, COATED ORAL
Qty: 30 | Refills: 0
Start: 2025-01-19

## 2025-01-19 RX ORDER — FAMOTIDINE 10 MG/ML
1 INJECTION INTRAVENOUS
Qty: 60 | Refills: 0
Start: 2025-01-19

## 2025-01-19 RX ORDER — AMIODARONE HYDROCHLORIDE 50 MG/ML
1 INJECTION, SOLUTION INTRAVENOUS
Qty: 37 | Refills: 0
Start: 2025-01-19 | End: 2025-02-17

## 2025-01-19 RX ORDER — METOPROLOL SUCCINATE 25 MG
1 TABLET, EXTENDED RELEASE 24 HR ORAL
Qty: 60 | Refills: 0
Start: 2025-01-19

## 2025-01-19 RX ORDER — POTASSIUM CHLORIDE 750 MG/1
1 TABLET, EXTENDED RELEASE ORAL
Qty: 5 | Refills: 0
Start: 2025-01-19

## 2025-01-19 RX ORDER — OXYCODONE HYDROCHLORIDE AND ACETAMINOPHEN 5; 325 MG/1; MG/1
1 TABLET ORAL
Qty: 30 | Refills: 0
Start: 2025-01-19

## 2025-01-19 RX ORDER — INSULIN GLARGINE-YFGN 100 [IU]/ML
30 INJECTION, SOLUTION SUBCUTANEOUS
Qty: 2 | Refills: 0
Start: 2025-01-19 | End: 2025-02-17

## 2025-01-19 RX ORDER — AMIODARONE HYDROCHLORIDE 50 MG/ML
1 INJECTION, SOLUTION INTRAVENOUS
Qty: 37 | Refills: 0 | DISCHARGE
Start: 2025-01-19 | End: 2025-02-17

## 2025-01-19 RX ORDER — METFORMIN HYDROCHLORIDE 1000 MG/1
1 TABLET, COATED ORAL
Qty: 30 | Refills: 0
Start: 2025-01-19

## 2025-01-19 RX ADMIN — FAMOTIDINE 20 MILLIGRAM(S): 10 INJECTION INTRAVENOUS at 06:16

## 2025-01-19 RX ADMIN — AMIODARONE HYDROCHLORIDE 200 MILLIGRAM(S): 50 INJECTION, SOLUTION INTRAVENOUS at 06:15

## 2025-01-19 RX ADMIN — Medication 10 UNIT(S): at 07:50

## 2025-01-19 RX ADMIN — ASPIRIN 81 MILLIGRAM(S): 81 TABLET, COATED ORAL at 11:42

## 2025-01-19 RX ADMIN — ANTISEPTIC SURGICAL SCRUB 1 APPLICATION(S): 0.04 SOLUTION TOPICAL at 06:13

## 2025-01-19 RX ADMIN — INSULIN GLARGINE-YFGN 30 UNIT(S): 100 INJECTION, SOLUTION SUBCUTANEOUS at 07:51

## 2025-01-19 RX ADMIN — Medication 5000 UNIT(S): at 06:16

## 2025-01-19 RX ADMIN — Medication 25 MILLIGRAM(S): at 06:16

## 2025-01-19 NOTE — DISCHARGE NOTE NURSING/CASE MANAGEMENT/SOCIAL WORK - FINANCIAL ASSISTANCE
[facility] provides services at a reduced cost to those who are determined to be eligible through [facility]’s financial assistance program. Information regarding [facility]’s financial assistance program can be found by going to [email or web address] or by calling [phone].

## 2025-01-19 NOTE — PROGRESS NOTE ADULT - PROVIDER SPECIALTY LIST ADULT
CT Surgery
Critical Care
Critical Care
CT Surgery
Critical Care

## 2025-01-19 NOTE — DISCHARGE NOTE NURSING/CASE MANAGEMENT/SOCIAL WORK - PATIENT PORTAL LINK FT
You can access the FollowMyHealth Patient Portal offered by [facility] by registering at the following website: [email or web address]. By joining [facility]’s FollowMyHealth portal, you will also be able to view your health information using other applications (apps) compatible with our system.

## 2025-01-19 NOTE — DISCHARGE NOTE NURSING/CASE MANAGEMENT/SOCIAL WORK - NSDCPEFALRISK_GEN_ALL_CORE
For information on Fall & Injury Prevention, visit: [email or web address] OR  [email or web address] OR  https://www.cdc.gov/steadi/patient.html

## 2025-01-19 NOTE — PROGRESS NOTE ADULT - SUBJECTIVE AND OBJECTIVE BOX
OPERATIVE PROCEDURE(s):   avr             POD # 5    SURGEON(s): rose    SUBJECTIVE ASSESSMENT: no complaints     Vital Signs Last 24 Hrs  T(C): 36.2 (18 Jan 2025 20:00), Max: 37.1 (18 Jan 2025 12:00)  T(F): 97.2 (18 Jan 2025 20:00), Max: 98.7 (18 Jan 2025 12:00)  HR: 71 (19 Jan 2025 08:00) (71 - 89)  BP: 118/65 (19 Jan 2025 08:00) (118/65 - 147/78)  BP(mean): 84 (19 Jan 2025 08:00) (84 - 104)  RR: 16 (19 Jan 2025 08:00) (7 - 28)  SpO2: 96% (19 Jan 2025 08:00) (92% - 96%)    Parameters below as of 19 Jan 2025 08:00  Patient On (Oxygen Delivery Method): room air      01-18-25 @ 07:01  -  01-19-25 @ 07:00  --------------------------------------------------------  IN: 1160 mL / OUT: 2500 mL / NET: -1340 mL    01-19-25 @ 07:01  -  01-19-25 @ 11:27  --------------------------------------------------------  IN: 0 mL / OUT: 300 mL / NET: -300 mL      Physical Exam:  General: NAD; A&Ox3  Cardiac: S1/S2, RRR, no murmur  Lungs: unlabored respirations, CTA b/l  Abdomen: Soft/NT/ND  Sternum: Intact, no click, incision healing well with no drainage  Incisions: Incisions clean/dry/intact  Extremities: No edema b/l lower extremities; good capillary refill; no cyanosis; palpable pedal pulses      LABS:                        12.2   7.69  )-----------( 182      ( 19 Jan 2025 02:37 )             36.5     COUMADIN:   [ ] YES [x ] NO      01-19    140  |  103  |  17  ----------------------------<  89  4.1   |  26  |  0.9    Ca    8.3[L]      19 Jan 2025 02:37  Mg     2.2     01-19    TPro  5.5[L]  /  Alb  3.2[L]  /  TBili  0.6  /  DBili  x   /  AST  24  /  ALT  19  /  AlkPhos  61  01-19    Urinalysis Basic - ( 19 Jan 2025 02:37 )    Color: x / Appearance: x / SG: x / pH: x  Gluc: 89 mg/dL / Ketone: x  / Bili: x / Urobili: x   Blood: x / Protein: x / Nitrite: x   Leuk Esterase: x / RBC: x / WBC x   Sq Epi: x / Non Sq Epi: x / Bacteria: x        MEDICATIONS  (STANDING):  albuterol/ipratropium for Nebulization 3 milliLiter(s) Nebulizer every 6 hours  aMIOdarone    Tablet 200 milliGRAM(s) Oral two times a day  aspirin enteric coated 81 milliGRAM(s) Oral daily  bisacodyl Suppository 10 milliGRAM(s) Rectal once  chlorhexidine 2% Cloths 1 Application(s) Topical daily  dextrose 5%. 1000 milliLiter(s) (50 mL/Hr) IV Continuous <Continuous>  dextrose 5%. 1000 milliLiter(s) (100 mL/Hr) IV Continuous <Continuous>  dextrose 50% Injectable 50 milliLiter(s) IV Push every 15 minutes  dextrose 50% Injectable 25 milliLiter(s) IV Push every 15 minutes  famotidine    Tablet 20 milliGRAM(s) Oral two times a day  glucagon  Injectable 1 milliGRAM(s) IntraMuscular once  heparin   Injectable 5000 Unit(s) SubCutaneous every 8 hours  insulin glargine Injectable (LANTUS) 30 Unit(s) SubCutaneous every morning  insulin lispro (ADMELOG) corrective regimen sliding scale   SubCutaneous three times a day before meals  insulin lispro Injectable (ADMELOG) 10 Unit(s) SubCutaneous three times a day before meals  metoprolol tartrate 25 milliGRAM(s) Oral two times a day  polyethylene glycol 3350 17 Gram(s) Oral daily  senna 2 Tablet(s) Oral at bedtime    MEDICATIONS  (PRN):  dextrose Oral Gel 15 Gram(s) Oral once PRN Blood Glucose LESS THAN 70 milliGRAM(s)/deciliter      Allergies    No Known Allergies    Intolerances        Ambulation/Activity Status:  amb stairs with pt      RADIOLOGY & ADDITIONAL TESTS:  ACC: [id number] EXAM:  XR CHEST PORTABLE ROUTINE 1V   ORDERED BY: JEROME HARDY     PROCEDURE DATE:  01/18/2025      INTERPRETATION:  STUDY INDICATION: Shortness of Breath    TECHNIQUE:  Portable frontal view of the chest obtained.    COMPARISON: 1/17/2025    FINDINGS/  IMPRESSION:    Unchanged right IJ vascular sheath.    No focal consolidation, pneumothorax or pleural effusion.    Stable cardiomediastinal silhouette.    Unchanged osseous structures.    --- End of Report ---    Assessment/Plan:  59y Male status-post AVR POD # 5  - Case and plan discussed with CTU Intensivist and CT Surgeon - Dr. Montemayor/Timoteo/Rosalee  - Continue CTU supportive care    - Continue DVT/GI prophylaxis  - Incentive Spirometry 10 times an hour  - Continue to advance physical activity as tolerated and continue PT/OT as directed  1. Aortic stenosis s/p AVR: Continue ASA & BB. Pain control as needed.   2. HTN: PO BB  3. Post-op A. Fib: pt now in SR ; cont Amio  4. COPD/Hypoxia: currently on supplemental O2, duonebs ordered. encourage cough and deep breathing  5. DM/Glucose Control: inuslin gtt d/c'd. Lantus 30U AM and Glucophage on discharge    Social Service Disposition:  home today

## 2025-01-20 ENCOUNTER — TRANSCRIPTION ENCOUNTER (OUTPATIENT)
Age: 60
End: 2025-01-20

## 2025-01-21 ENCOUNTER — EMERGENCY (EMERGENCY)
Facility: HOSPITAL | Age: 60
LOS: 0 days | Discharge: HOME | End: 2025-01-22
Attending: EMERGENCY MEDICINE
Payer: MEDICAID

## 2025-01-21 VITALS
SYSTOLIC BLOOD PRESSURE: 97 MMHG | TEMPERATURE: 98 F | OXYGEN SATURATION: 99 % | HEART RATE: 115 BPM | HEIGHT: 72 IN | WEIGHT: 199.96 LBS | DIASTOLIC BLOOD PRESSURE: 69 MMHG | RESPIRATION RATE: 18 BRPM

## 2025-01-21 DIAGNOSIS — Z93.3 COLOSTOMY STATUS: Chronic | ICD-10-CM

## 2025-01-21 PROBLEM — Z86.79 PERSONAL HISTORY OF OTHER DISEASES OF THE CIRCULATORY SYSTEM: Chronic | Status: ACTIVE | Noted: 2025-01-08

## 2025-01-21 LAB
APTT BLD: 87.8 SEC — CRITICAL HIGH (ref 27–39.2)
INR BLD: 1.02 RATIO — SIGNIFICANT CHANGE UP (ref 0.65–1.3)
PROTHROM AB SERPL-ACNC: 12.1 SEC — SIGNIFICANT CHANGE UP (ref 9.95–12.87)

## 2025-01-21 PROCEDURE — 85610 PROTHROMBIN TIME: CPT

## 2025-01-21 PROCEDURE — 93010 ELECTROCARDIOGRAM REPORT: CPT

## 2025-01-21 PROCEDURE — 93005 ELECTROCARDIOGRAM TRACING: CPT

## 2025-01-21 PROCEDURE — 85027 COMPLETE CBC AUTOMATED: CPT

## 2025-01-21 PROCEDURE — 99285 EMERGENCY DEPT VISIT HI MDM: CPT | Mod: 25

## 2025-01-21 PROCEDURE — 83735 ASSAY OF MAGNESIUM: CPT

## 2025-01-21 PROCEDURE — G0378: CPT

## 2025-01-21 PROCEDURE — 71045 X-RAY EXAM CHEST 1 VIEW: CPT

## 2025-01-21 PROCEDURE — 99223 1ST HOSP IP/OBS HIGH 75: CPT

## 2025-01-21 PROCEDURE — 96374 THER/PROPH/DIAG INJ IV PUSH: CPT

## 2025-01-21 PROCEDURE — 85730 THROMBOPLASTIN TIME PARTIAL: CPT

## 2025-01-21 PROCEDURE — 80053 COMPREHEN METABOLIC PANEL: CPT

## 2025-01-21 PROCEDURE — 36415 COLL VENOUS BLD VENIPUNCTURE: CPT

## 2025-01-21 RX ORDER — FAMOTIDINE 20 MG/1
20 TABLET, FILM COATED ORAL DAILY
Refills: 0 | Status: DISCONTINUED | OUTPATIENT
Start: 2025-01-21 | End: 2025-01-22

## 2025-01-21 RX ORDER — POTASSIUM CHLORIDE 600 MG/1
20 TABLET, FILM COATED, EXTENDED RELEASE ORAL ONCE
Refills: 0 | Status: COMPLETED | OUTPATIENT
Start: 2025-01-21 | End: 2025-01-21

## 2025-01-21 RX ORDER — AMIODARONE HYDROCHLORIDE 200 MG/1
200 TABLET ORAL
Refills: 0 | Status: DISCONTINUED | OUTPATIENT
Start: 2025-01-22 | End: 2025-01-22

## 2025-01-21 RX ORDER — FUROSEMIDE 20 MG
20 TABLET ORAL DAILY
Refills: 0 | Status: DISCONTINUED | OUTPATIENT
Start: 2025-01-21 | End: 2025-01-22

## 2025-01-21 RX ORDER — METFORMIN 850 MG/1
750 TABLET ORAL DAILY
Refills: 0 | Status: DISCONTINUED | OUTPATIENT
Start: 2025-01-21 | End: 2025-01-22

## 2025-01-21 RX ORDER — METOPROLOL TARTRATE 50 MG
37.5 TABLET ORAL
Refills: 0 | Status: DISCONTINUED | OUTPATIENT
Start: 2025-01-21 | End: 2025-01-22

## 2025-01-21 RX ORDER — APIXABAN 5 MG/1
2.5 TABLET, FILM COATED ORAL EVERY 12 HOURS
Refills: 0 | Status: DISCONTINUED | OUTPATIENT
Start: 2025-01-21 | End: 2025-01-22

## 2025-01-21 RX ORDER — AMIODARONE HYDROCHLORIDE 200 MG/1
200 TABLET ORAL ONCE
Refills: 0 | Status: COMPLETED | OUTPATIENT
Start: 2025-01-21 | End: 2025-01-21

## 2025-01-21 RX ORDER — DILTIAZEM HYDROCHLORIDE 300 MG/1
10 CAPSULE, COATED, EXTENDED RELEASE ORAL
Qty: 125 | Refills: 0 | Status: DISCONTINUED | OUTPATIENT
Start: 2025-01-21 | End: 2025-01-21

## 2025-01-21 RX ADMIN — Medication 20 MILLIGRAM(S): at 23:39

## 2025-01-21 RX ADMIN — POTASSIUM CHLORIDE 20 MILLIEQUIVALENT(S): 600 TABLET, FILM COATED, EXTENDED RELEASE ORAL at 23:00

## 2025-01-21 RX ADMIN — APIXABAN 2.5 MILLIGRAM(S): 5 TABLET, FILM COATED ORAL at 23:39

## 2025-01-21 RX ADMIN — AMIODARONE HYDROCHLORIDE 200 MILLIGRAM(S): 200 TABLET ORAL at 21:24

## 2025-01-21 RX ADMIN — METFORMIN 750 MILLIGRAM(S): 850 TABLET ORAL at 23:39

## 2025-01-21 RX ADMIN — FAMOTIDINE 20 MILLIGRAM(S): 20 TABLET, FILM COATED ORAL at 23:39

## 2025-01-21 RX ADMIN — DILTIAZEM HYDROCHLORIDE 10 MG/HR: 300 CAPSULE, COATED, EXTENDED RELEASE ORAL at 15:26

## 2025-01-21 RX ADMIN — Medication 37.5 MILLIGRAM(S): at 23:39

## 2025-01-21 NOTE — ED PROVIDER NOTE - DIFFERENTIAL DIAGNOSIS
The differential diagnosis for patients clinical presentation includes but is not limited to: afib RVR, arrythmia, ischemia Differential Diagnosis

## 2025-01-21 NOTE — ED ADULT TRIAGE NOTE - HEIGHT IN FEET
[FreeTextEntry1] : CT abdomen/Pelvis:  -Cholelithiasis without evidence of acute cholecystitis.  -Left adrenal adenoma.  -Mildly nodular hepatic contour suggests possible cirrhosis.   6

## 2025-01-21 NOTE — ED PROVIDER NOTE - PHYSICAL EXAMINATION
GENERAL: Well-developed; well-nourished; in no acute distress.   SKIN: warm, dry  HEAD: Normocephalic; atraumatic.  EYES: PERRLA, EOMI, no conjunctival erythema  ENT: No nasal discharge; airway clear.  NECK: Supple; non tender.  CARD: Irregularly irregular rhythm, tachycardic. S1, S2 normal; no murmurs, gallops, or rubs.   RESP: LCTAB; No wheezes, rales, rhonchi, or stridor.  ABD: soft, nontender, and nondistended  EXT: Normal ROM.  No LE TTP or edema bilaterally.  NEURO: A/ox3, grossly unremarkable  PSYCH: Cooperative, appropriate.

## 2025-01-21 NOTE — PROGRESS NOTE ADULT - SUBJECTIVE AND OBJECTIVE BOX
Mr. PEDRITO DIAZ is a 59 year M, neversmoker, with PMHx of HTN, DMII, Severe AS. On 01/14/25, he underwent aortic valve replacement. Post-operatively, the patient developed atrial fibrillation, but converted to sinus rhythm pharmacologically.   He reported a history of migraines and had similar symptoms at that time.  He was evaluated by neurology and ophthalmology who recommended outpatient follow up.  He was discharged home in stable condition on POD # 5. Today he presented to ED with complains of palpitations. Patient was in rapid atrial fibrillation. he was started on heparin and cardizem drip    Plan:  Admit to Obs  Start Eliquis 2.5 BID  Increase Beta blocked to 37.5mg BID  D/c cardizem drip.  D/ heparin drip  Continue home dose amio 200 BID  Continue Metformin 750 BID  AM labs. CMP, Mg, CBC  Will f/u   Mr. PEDRITO DIAZ is a 59 year M, neversmoker, with PMHx of HTN, DMII, Severe AS. On 01/14/25, he underwent aortic valve replacement. Post-operatively, the patient developed atrial fibrillation, but converted to sinus rhythm pharmacologically.   He reported a history of migraines and had similar symptoms at that time.  He was evaluated by neurology and ophthalmology who recommended outpatient follow up.  He was discharged home in stable condition on POD # 5. Today he presented to ED with complains of palpitations. Patient was in rapid atrial fibrillation. he was started on heparin and cardizem drip    Plan:  Admit to Obs  Start Eliquis 2.5 BID  Increase Beta blockers to 37.5mg BID  D/c cardizem drip.  D/ heparin drip  Continue home dose amio 200 BID  Continue Metformin 750 BID  AM labs. CMP, Mg, CBC  Will f/u

## 2025-01-21 NOTE — ED PROVIDER NOTE - ATTENDING CONTRIBUTION TO CARE
59-year-old male past medical history severe aortic stenosis status post aortic valve replacement with Dr. Montemayor on 1/14/2025, CAD, diabetes, hypertension, hyperlipidemia, basal cell carcinoma presents to the emergency department as a transfer from [facility] for evaluation of A-fib RVR.  Per [facility] patient was placed on a Cardizem drip, had negative CT PE study, and was transferred here for CT surgery evaluation.    CONSTITUTIONAL: NAD.   SKIN: warm, dry  HEAD: Normocephalic; atraumatic.  ENT: MMM.   NECK: Supple.  CARD: +irregularly irregular   RESP: No wheezes, rales or rhonchi.  ABD: soft ntnd.   EXT: Normal ROM.  No lower extremity edema.   NEURO: Alert, oriented, grossly unremarkable.

## 2025-01-21 NOTE — ED PROVIDER NOTE - OBJECTIVE STATEMENT
59-year-old male, with past medical history of HTN, HLD, DM, CAD, aortic valve repair on 1/14/25 with Dr. Montemayor, presents to the ED as a transfer from [facility] for new onset atrial fibrillation with RVR.  Arrived on Cardizem and heparin drip with heart rate in the 110's.

## 2025-01-21 NOTE — ED CDU PROVIDER INITIAL DAY NOTE - CLINICAL SUMMARY MEDICAL DECISION MAKING FREE TEXT BOX
59-year-old man, history of hypertension, diabetes, AAS status post AVR on 1/14/2025 by Dr. Muller was placed in CDU for observation after he presented to [facility] ED with new onset A-fib with RVR.  He was placed on a Cardizem drip and a heparin drip and transferred to [facility].  Otherwise he has been feeling well.  ED workup grossly unremarkable.  Chest x-ray negative for fluid overload.  On my eval, patient is rate controlled and comfortable.  He was evaluated by CT surgery and cleared for discharge to continue his p.o. amiodarone, Eliquis, and follow-up closely.  He is comfortable going home.

## 2025-01-21 NOTE — ED ADULT NURSE NOTE - CHIEF COMPLAINT QUOTE
PT BIBA transfer from [facility] c/o of new onset Afib. PT on Cardizem dose of 15.

## 2025-01-21 NOTE — ED ADULT NURSE NOTE - OBJECTIVE STATEMENT
Pt BIBA transfer from [facility] for new onset afib, was started on heparin gtt and cardizem gtt around 930am per EMS. Arrived with infusions: heparin 10u/hr and cardizem 15mg/hr. Pt denies chest pain.

## 2025-01-21 NOTE — ED CDU PROVIDER INITIAL DAY NOTE - OBJECTIVE STATEMENT
60 y/o M, PMHx HTN, DMII, Severe AS - s/p AVR on 01/14/25 by Dr. Parker, presented as a transfer to the ED from [facility] ED as patient was found in RVR. In [facility] ED, patient was given a Cardizem drip and a Heparin drip. Per CT-SX team, post-operatively, the patient developed atrial fibrillation, but converted to sinus rhythm pharmacologically.   He reported a history of migraines and had similar symptoms at that time.  He was evaluated by neurology and ophthalmology who recommended outpatient follow up.  He was discharged home in stable condition on POD # 5. Today he presented to ED with complains of palpitations that have since improved. He denies chest pain, dyspnea, nausea, vomiting, fever, chills, abdominal pain and back pain. CT-SX requesting observation status for monitoring, medication administration (see note), CXR, CMP, Mg & CBC in the AM.     Plan:  Admit to Obs  Start Eliquis 2.5 BID  Increase Beta blockers to 37.5mg BID  D/c cardizem drip.  D/ heparin drip  Continue home dose amio 200 BID  Continue Metformin 750 BID  AM labs. CMP, Mg, CBC  Will f/u 58 y/o M, PMHx HTN, DMII, Severe AS - s/p AVR on 01/14/25 by Dr. Parker, presented as a transfer to the ED from [facility] ED as patient was found in RVR. In [facility] ED, patient was given a Cardizem drip and a Heparin drip. Per CT-SX team, post-operatively, the patient developed atrial fibrillation, but converted to sinus rhythm pharmacologically.   He reported a history of migraines and had similar symptoms at that time.  He was evaluated by neurology and ophthalmology who recommended outpatient follow up.  He was discharged home in stable condition on POD # 5. Today he presented to ED with complains of palpitations that have since improved. He denies chest pain, dyspnea, nausea, vomiting, fever, chills, abdominal pain and back pain. CT-SX requesting observation status for monitoring, medication administration (see note), CXR, CMP, Mg & CBC in the AM.

## 2025-01-21 NOTE — ED PROVIDER NOTE - CLINICAL SUMMARY MEDICAL DECISION MAKING FREE TEXT BOX
58 y/o M presented to ED for eval of afib RVR, transferred from [facility] Patient with recent aortic valve repair at [facility] with Dr. Montemayor. EKG ordered and reviewed. Afib RVR.    Appropriate medications for patient's presenting complaints were ordered and effects were reassessed.  Patient's records (prior hospital, ED visit, and/or nursing home notes if available) were reviewed.  New labs sent. Additional history was obtained from EMS, family, and/or PCP (where available).  Escalation to admission/observation was considered. Placed in ED OBS for further care with CT surgery recommendations.

## 2025-01-21 NOTE — ED PROVIDER NOTE - CARE PLAN
Assessment and plan of treatment:	Plan- EKG   Principal Discharge DX:	Atrial fibrillation with RVR  Assessment and plan of treatment:	Plan- EKG   1

## 2025-01-22 VITALS
SYSTOLIC BLOOD PRESSURE: 118 MMHG | RESPIRATION RATE: 20 BRPM | TEMPERATURE: 98 F | HEART RATE: 90 BPM | DIASTOLIC BLOOD PRESSURE: 77 MMHG | OXYGEN SATURATION: 97 %

## 2025-01-22 LAB
ALBUMIN SERPL ELPH-MCNC: 3.8 G/DL — SIGNIFICANT CHANGE UP (ref 3.5–5.2)
ALP SERPL-CCNC: 68 U/L — SIGNIFICANT CHANGE UP (ref 30–115)
ALT FLD-CCNC: 18 U/L — SIGNIFICANT CHANGE UP (ref 0–41)
ANION GAP SERPL CALC-SCNC: 12 MMOL/L — SIGNIFICANT CHANGE UP (ref 7–14)
AST SERPL-CCNC: 18 U/L — SIGNIFICANT CHANGE UP (ref 0–41)
BILIRUB SERPL-MCNC: 0.6 MG/DL — SIGNIFICANT CHANGE UP (ref 0.2–1.2)
BUN SERPL-MCNC: 16 MG/DL — SIGNIFICANT CHANGE UP (ref 10–20)
CALCIUM SERPL-MCNC: 9.1 MG/DL — SIGNIFICANT CHANGE UP (ref 8.4–10.5)
CHLORIDE SERPL-SCNC: 100 MMOL/L — SIGNIFICANT CHANGE UP (ref 98–110)
CO2 SERPL-SCNC: 24 MMOL/L — SIGNIFICANT CHANGE UP (ref 17–32)
CREAT SERPL-MCNC: 0.8 MG/DL — SIGNIFICANT CHANGE UP (ref 0.7–1.5)
EGFR: 101 ML/MIN/1.73M2 — SIGNIFICANT CHANGE UP
GLUCOSE SERPL-MCNC: 184 MG/DL — HIGH (ref 70–99)
HCT VFR BLD CALC: 39.6 % — LOW (ref 42–52)
HGB BLD-MCNC: 13.5 G/DL — LOW (ref 14–18)
MAGNESIUM SERPL-MCNC: 1.8 MG/DL — SIGNIFICANT CHANGE UP (ref 1.8–2.4)
MCHC RBC-ENTMCNC: 31.4 PG — HIGH (ref 27–31)
MCHC RBC-ENTMCNC: 34.1 G/DL — SIGNIFICANT CHANGE UP (ref 32–37)
MCV RBC AUTO: 92.1 FL — SIGNIFICANT CHANGE UP (ref 80–94)
NRBC # BLD: 0 /100 WBCS — SIGNIFICANT CHANGE UP (ref 0–0)
PLATELET # BLD AUTO: 208 K/UL — SIGNIFICANT CHANGE UP (ref 130–400)
PMV BLD: 9.5 FL — SIGNIFICANT CHANGE UP (ref 7.4–10.4)
POTASSIUM SERPL-MCNC: 4.5 MMOL/L — SIGNIFICANT CHANGE UP (ref 3.5–5)
POTASSIUM SERPL-SCNC: 4.5 MMOL/L — SIGNIFICANT CHANGE UP (ref 3.5–5)
PROT SERPL-MCNC: 6.5 G/DL — SIGNIFICANT CHANGE UP (ref 6–8)
RBC # BLD: 4.3 M/UL — LOW (ref 4.7–6.1)
RBC # FLD: 13.6 % — SIGNIFICANT CHANGE UP (ref 11.5–14.5)
SODIUM SERPL-SCNC: 136 MMOL/L — SIGNIFICANT CHANGE UP (ref 135–146)
WBC # BLD: 10.06 K/UL — SIGNIFICANT CHANGE UP (ref 4.8–10.8)
WBC # FLD AUTO: 10.06 K/UL — SIGNIFICANT CHANGE UP (ref 4.8–10.8)

## 2025-01-22 PROCEDURE — 71045 X-RAY EXAM CHEST 1 VIEW: CPT | Mod: 26

## 2025-01-22 PROCEDURE — 99239 HOSP IP/OBS DSCHRG MGMT >30: CPT

## 2025-01-22 RX ORDER — APIXABAN 5 MG/1
1 TABLET, FILM COATED ORAL
Qty: 60 | Refills: 0
Start: 2025-01-22

## 2025-01-22 RX ORDER — METOPROLOL TARTRATE 50 MG
1 TABLET ORAL
Qty: 60 | Refills: 0
Start: 2025-01-22

## 2025-01-22 RX ADMIN — AMIODARONE HYDROCHLORIDE 200 MILLIGRAM(S): 200 TABLET ORAL at 05:46

## 2025-01-22 RX ADMIN — APIXABAN 2.5 MILLIGRAM(S): 5 TABLET, FILM COATED ORAL at 05:46

## 2025-01-22 RX ADMIN — Medication 37.5 MILLIGRAM(S): at 05:46

## 2025-01-22 RX ADMIN — Medication 20 MILLIGRAM(S): at 05:46

## 2025-01-22 NOTE — ED CDU PROVIDER SUBSEQUENT DAY NOTE - CLINICAL SUMMARY MEDICAL DECISION MAKING FREE TEXT BOX
59-year-old man, history of hypertension, diabetes, AAS status post AVR on 1/14/2025 by Dr. Muller was placed in CDU for observation after he presented to [facility] ED with new onset A-fib with RVR.  He was placed on a Cardizem drip and a heparin drip and transferred to [facility].  Otherwise he has been feeling well.  ED workup grossly unremarkable.  Chest x-ray negative for fluid overload.  On my eval, patient is rate controlled and comfortable.  He was evaluated by CT surgery and cleared for discharge to continue his p.o. amiodarone, Eliquis, and follow-up closely.  He is comfortable going home.

## 2025-01-22 NOTE — ED CDU PROVIDER DISPOSITION NOTE - PATIENT PORTAL LINK FT
You can access the FollowMyHealth Patient Portal offered by [facility] by registering at the following website: [email or web address]. By joining [employer]’s FollowMyHealth portal, you will also be able to view your health information using other applications (apps) compatible with our system.

## 2025-01-22 NOTE — PROGRESS NOTE ADULT - SUBJECTIVE AND OBJECTIVE BOX
OPERATIVE PROCEDURE(s): AVR 1/14, OBS overnight for afib               POD # 8                      SURGEON(s): JACKSON Woody    HPI:  Mr. PEDRITO DIAZ is a 59 year M, neversmoker, with PMHx of HTN, DMII, Severe AS. On 01/14/25, he underwent aortic valve replacement. Post-operatively, the patient developed atrial fibrillation, but converted to sinus rhythm pharmacologically.   He reported a history of migraines and had similar symptoms at that time.  He was evaluated by neurology and ophthalmology who recommended outpatient follow up.  He was discharged home in stable condition on POD # 5. Today patient was BIBEMS to [facility] ED for rapid afib. Patient was started on cardizem and heparin gtt, where he responded well, HR decreased 110s. CTA chest was completed. PE negative.  Patient was transferred to [facility] for further evaluation. Once in the ED, patient was placed on amio gtt and placed under observation.       SUBJECTIVE ASSESSMENT:59yMale patient seen and examined at bedside. Converted to NSR overnight, eliquis 2.5mg BID started    Vital Signs Last 24 Hrs  T(F): 98 (22 Jan 2025 07:49), Max: 98 (21 Jan 2025 14:49)  HR: 90 (22 Jan 2025 07:49) (72 - 124)  BP: 118/77 (22 Jan 2025 07:49) (97/53 - 133/80)  BP(mean): 68 (21 Jan 2025 15:15) (68 - 68)  RR: 20 (22 Jan 2025 07:49) (18 - 20)  SpO2: 97% (22 Jan 2025 07:49) (94% - 99%)      I&O's Detail    Net: I&O's Detail    CAPILLARY BLOOD GLUCOSE        A1C with Estimated Average Glucose Result: 9.9 % (01-08-25 @ 09:29)  A1C with Estimated Average Glucose Result: 9.6 % (12-11-24 @ 07:42)      Physical Exam:  General: NAD; A&Ox3  Neuro: pupils equal and reactive, speech clear, no overt sensory or motor deficits  Cardiac: S1/S2, RRR, no murmur, no rubs  Lungs: unlabored respirations, CTA b/l, no wheeze, no rales, no crackles  Abdomen: Soft/NT/ND; positive bowel sounds x 4  Sternum: Healing well, no drainage noted.   Incisions: Incisions clean/dry/intact  Extremities: 1+ edema b/l lower extremities; good capillary refill; no cyanosis; palpable 1+ pedal pulses b/l           LABS:                        13.5[L]  10.06 )-----------( 208      ( 22 Jan 2025 08:50 )             39.6[L]    01-22    136  |  100  |  16  ----------------------------<  184[H]  4.5   |  24  |  0.8    Ca    9.1      22 Jan 2025 08:50  Mg     1.8     01-22    TPro  6.5 [6.0 - 8.0]  /  Alb  3.8 [3.5 - 5.2]  /  TBili  0.6 [0.2 - 1.2]  /  DBili  x   /  AST  18 [0 - 41]  /  ALT  18 [0 - 41]  /  AlkPhos  68 [30 - 115]  01-22    PT/INR - ( 21 Jan 2025 16:05 )   PT: ;   INR: 1.02 ratio         PTT - ( 21 Jan 2025 16:05 )  PTT:87.8 sec        RADIOLOGY & ADDITIONAL TESTS:  CXR:   < from: Xray Chest 1 View- PORTABLE-Routine (Xray Chest 1 View- PORTABLE-Routine in AM.) (01.22.25 @ 06:44) >    INTERPRETATION:  CLINICAL HISTORY / REASON FOR EXAM: Post-operative   evaluation.    COMPARISON: Chest radiograph from January 19, 2025.    TECHNIQUE/POSITIONING: Satisfactory. Single image, AP chest radiograph.    FINDINGS:    SUPPORT DEVICES: None.    CARDIAC/MEDIASTINUM/HILUM: Unchanged cardiac silhouette.    LUNG PARENCHYMA/PLEURA: Stable elevation of the right hemidiaphragm. No   focal consolidation.    SKELETON/SOFT TISSUES: Unchanged.      IMPRESSION:    No radiographic evidence of acute cardiopulmonary disease.        EKG:  < from: 12 Lead ECG (01.21.25 @ 14:48) >    Ventricular Rate 115 BPM    QRS Duration 90 ms    Q-T Interval 284 ms    QTC Calculation(Bazett) 392 ms    R Axis 87 degrees    T Axis 268 degrees    Diagnosis Line Atrial fibrillation with rapid ventricular response  ST & T wave abnormality, consider inferolateral ischemia  Abnormal ECG        Allergies    No Known Allergies    Intolerances      MEDICATIONS  (STANDING):  aMIOdarone    Tablet 200 milliGRAM(s) Oral two times a day  apixaban 2.5 milliGRAM(s) Oral every 12 hours  famotidine    Tablet 20 milliGRAM(s) Oral daily  furosemide    Tablet 20 milliGRAM(s) Oral daily  metFORMIN 750 milliGRAM(s) Oral daily  metoprolol tartrate 37.5 milliGRAM(s) Oral two times a day    MEDICATIONS  (PRN):    Home Medications:      Pharmacologic DVT Prophylaxis [X] YES, [] NO: Contraindication:  SCD's: YES b/l    GI Prophylaxis: pepcid    Post-Op Beta-Blockers: [X] Yes, [] No: contraindication:  Post-Op Aspirin:  [X] Yes, [] No: contraindication:      Ambulation/Activity Status: ambulate as tolerated    Assessment/Plan:  59y Male status-post AVR 1/14, presented to the ED for afib  - Case and plan discussed with CTU Intensivist and CT Surgeon - Dr. Montemayor  - Continue CTU supportive care and ongoing plan of care as per continuing CTU rounds.   - Continue GI prophylaxis  - Incentive Spirometry 10 times an hour  - Continue to advance physical activity as tolerated and continue PT/OT as directed  1. s/p AVR: Continue PO medications,   2. Afib: currently NSR, continue PO amio 200mg BID and eliquis 2.5mg BID.   3. DM/Glucose: A1c 9.9, patient continue PO metformin, d/c lantus  - Cleared by CT Surgery, patient to follow-up with Dr. Montemayor on 1/30 at 12:00

## 2025-01-22 NOTE — ED CDU PROVIDER DISPOSITION NOTE - CLINICAL COURSE
59-year-old man, history of hypertension, diabetes, AAS status post AVR on 1/14/2025 by Dr. Muller was placed in CDU for observation after he presented to [facility] ED with new onset A-fib with RVR.  He was placed on a Cardizem drip and a heparin drip and transferred to [facility].  Otherwise he has been feeling well.  ED workup grossly unremarkable.  Chest x-ray negative for fluid overload.  On my eval, patient is rate controlled and comfortable.  He was evaluated by CT surgery and cleared for discharge to continue his p.o. amiodarone, Eliquis, and follow-up closely.  He is comfortable going home.

## 2025-01-22 NOTE — ED CDU PROVIDER DISPOSITION NOTE - NSFOLLOWUPINSTRUCTIONS_ED_ALL_ED_FT
1. Continue your medications as prescribed.  2. Follow up with CT surgery as scheduled.  3. Return to the ED for persistent or worsening shortness of breath, palpitations, chest pains, or any other problems. 1. Continue your medications as prescribed.  2. Follow up with CT surgery as scheduled.  3. Return to the ED for persistent or worsening shortness of breath, palpitations, chest pains, or any other problems.    Shortness of breath    Shortness of breath (dyspnea) means you have trouble breathing and could indicate a medical problem. Causes include lung disease, heart disease, low amount of red blood cells (anemia), poor physical fitness, being overweight, smoking, etc. Your health care provider today may not be able to find a cause for your shortness of breath after your exam. In this case, it is important to have a follow-up exam with your primary care physician as instructed. If medicines were prescribed, take them as directed for the full length of time directed. Refrain from tobacco products.    SEEK IMMEDIATE MEDICAL CARE IF YOU HAVE ANY OF THE FOLLOWING SYMPTOMS: worsening shortness of breath, chest pain, back pain, abdominal pain, fever, coughing up blood, lightheadedness/dizziness.

## 2025-01-23 ENCOUNTER — APPOINTMENT (OUTPATIENT)
Dept: CARDIOTHORACIC SURGERY | Facility: CLINIC | Age: 60
End: 2025-01-23
Payer: MEDICAID

## 2025-01-23 VITALS
HEART RATE: 67 BPM | WEIGHT: 249 LBS | RESPIRATION RATE: 16 BRPM | DIASTOLIC BLOOD PRESSURE: 73 MMHG | OXYGEN SATURATION: 95 % | TEMPERATURE: 98.3 F | SYSTOLIC BLOOD PRESSURE: 119 MMHG | HEIGHT: 72 IN | BODY MASS INDEX: 33.72 KG/M2

## 2025-01-23 PROBLEM — Z95.2 S/P AVR: Status: ACTIVE | Noted: 2025-01-20

## 2025-01-23 PROCEDURE — 99024 POSTOP FOLLOW-UP VISIT: CPT

## 2025-01-23 RX ORDER — AMIODARONE HYDROCHLORIDE 200 MG/1
200 TABLET ORAL
Refills: 0 | Status: ACTIVE | COMMUNITY

## 2025-01-23 RX ORDER — ASPIRIN ENTERIC COATED TABLETS 81 MG 81 MG/1
81 TABLET, DELAYED RELEASE ORAL
Refills: 0 | Status: ACTIVE | COMMUNITY

## 2025-01-24 DIAGNOSIS — Z79.84 LONG TERM (CURRENT) USE OF ORAL HYPOGLYCEMIC DRUGS: ICD-10-CM

## 2025-01-24 DIAGNOSIS — R00.0 TACHYCARDIA, UNSPECIFIED: ICD-10-CM

## 2025-01-24 DIAGNOSIS — H53.9 UNSPECIFIED VISUAL DISTURBANCE: ICD-10-CM

## 2025-01-24 DIAGNOSIS — Q23.81 BICUSPID AORTIC VALVE: ICD-10-CM

## 2025-01-24 DIAGNOSIS — G43.909 MIGRAINE, UNSPECIFIED, NOT INTRACTABLE, WITHOUT STATUS MIGRAINOSUS: ICD-10-CM

## 2025-01-24 DIAGNOSIS — I97.190 OTHER POSTPROCEDURAL CARDIAC FUNCTIONAL DISTURBANCES FOLLOWING CARDIAC SURGERY: ICD-10-CM

## 2025-01-24 DIAGNOSIS — R09.02 HYPOXEMIA: ICD-10-CM

## 2025-01-24 DIAGNOSIS — I48.91 UNSPECIFIED ATRIAL FIBRILLATION: ICD-10-CM

## 2025-01-24 DIAGNOSIS — I95.9 HYPOTENSION, UNSPECIFIED: ICD-10-CM

## 2025-01-24 DIAGNOSIS — I35.0 NONRHEUMATIC AORTIC (VALVE) STENOSIS: ICD-10-CM

## 2025-01-24 DIAGNOSIS — D62 ACUTE POSTHEMORRHAGIC ANEMIA: ICD-10-CM

## 2025-01-24 DIAGNOSIS — I50.32 CHRONIC DIASTOLIC (CONGESTIVE) HEART FAILURE: ICD-10-CM

## 2025-01-24 DIAGNOSIS — I11.0 HYPERTENSIVE HEART DISEASE WITH HEART FAILURE: ICD-10-CM

## 2025-01-24 DIAGNOSIS — E11.9 TYPE 2 DIABETES MELLITUS WITHOUT COMPLICATIONS: ICD-10-CM

## 2025-01-24 DIAGNOSIS — D69.6 THROMBOCYTOPENIA, UNSPECIFIED: ICD-10-CM

## 2025-01-24 DIAGNOSIS — E87.70 FLUID OVERLOAD, UNSPECIFIED: ICD-10-CM
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
## 2025-01-30 ENCOUNTER — APPOINTMENT (OUTPATIENT)
Dept: CARDIOTHORACIC SURGERY | Facility: CLINIC | Age: 60
End: 2025-01-30
Payer: MEDICAID

## 2025-01-30 VITALS
SYSTOLIC BLOOD PRESSURE: 124 MMHG | DIASTOLIC BLOOD PRESSURE: 66 MMHG | RESPIRATION RATE: 16 BRPM | WEIGHT: 245 LBS | HEIGHT: 72 IN | OXYGEN SATURATION: 95 % | HEART RATE: 112 BPM | TEMPERATURE: 97.6 F | BODY MASS INDEX: 33.18 KG/M2

## 2025-01-30 DIAGNOSIS — Z95.2 PRESENCE OF PROSTHETIC HEART VALVE: ICD-10-CM

## 2025-01-30 PROCEDURE — 99024 POSTOP FOLLOW-UP VISIT: CPT

## 2025-01-30 RX ORDER — APIXABAN 5 MG/1
5 TABLET, FILM COATED ORAL
Qty: 60 | Refills: 0 | Status: ACTIVE | COMMUNITY

## 2025-01-30 RX ORDER — METOPROLOL TARTRATE 37.5 MG/1
37.5 TABLET, FILM COATED ORAL
Qty: 60 | Refills: 0 | Status: ACTIVE | COMMUNITY

## 2025-01-30 RX ORDER — INSULIN GLARGINE 300 [IU]/ML
300 INJECTION, SOLUTION SUBCUTANEOUS DAILY
Qty: 1 | Refills: 0 | Status: DISCONTINUED | COMMUNITY
Start: 2025-01-21 | End: 2025-01-30

## 2025-01-30 RX ORDER — FAMOTIDINE 20 MG/1
20 TABLET, FILM COATED ORAL
Refills: 0 | Status: ACTIVE | COMMUNITY

## 2025-01-31 LAB
ANION GAP SERPL CALC-SCNC: 16 MMOL/L
BUN SERPL-MCNC: 20 MG/DL
CALCIUM SERPL-MCNC: 10.3 MG/DL
CHLORIDE SERPL-SCNC: 99 MMOL/L
CO2 SERPL-SCNC: 22 MMOL/L
CREAT SERPL-MCNC: 1.2 MG/DL
EGFR: 69 ML/MIN/1.73M2
GLUCOSE SERPL-MCNC: 251 MG/DL
MAGNESIUM SERPL-MCNC: 1.8 MG/DL
POTASSIUM SERPL-SCNC: 4.9 MMOL/L
SODIUM SERPL-SCNC: 137 MMOL/L

## 2025-02-05 ENCOUNTER — OUTPATIENT (OUTPATIENT)
Dept: OUTPATIENT SERVICES | Facility: HOSPITAL | Age: 60
LOS: 1 days | Discharge: HOME | End: 2025-02-05

## 2025-02-05 DIAGNOSIS — I25.10 ATHEROSCLEROTIC HEART DISEASE OF NATIVE CORONARY ARTERY WITHOUT ANGINA PECTORIS: ICD-10-CM

## 2025-02-05 DIAGNOSIS — Z98.890 OTHER SPECIFIED POSTPROCEDURAL STATES: Chronic | ICD-10-CM

## 2025-02-05 DIAGNOSIS — Z93.3 COLOSTOMY STATUS: Chronic | ICD-10-CM

## 2025-02-13 ENCOUNTER — APPOINTMENT (OUTPATIENT)
Dept: CARDIOTHORACIC SURGERY | Facility: CLINIC | Age: 60
End: 2025-02-13

## 2025-02-13 VITALS
WEIGHT: 245 LBS | OXYGEN SATURATION: 94 % | HEART RATE: 74 BPM | HEIGHT: 72 IN | SYSTOLIC BLOOD PRESSURE: 153 MMHG | DIASTOLIC BLOOD PRESSURE: 88 MMHG | RESPIRATION RATE: 16 BRPM | BODY MASS INDEX: 33.18 KG/M2 | TEMPERATURE: 98.6 F

## 2025-02-13 DIAGNOSIS — Z00.00 ENCOUNTER FOR GENERAL ADULT MEDICAL EXAMINATION W/OUT ABNORMAL FINDINGS: ICD-10-CM

## 2025-02-13 DIAGNOSIS — Z95.2 PRESENCE OF PROSTHETIC HEART VALVE: ICD-10-CM

## 2025-02-13 PROCEDURE — 99024 POSTOP FOLLOW-UP VISIT: CPT

## 2025-02-13 RX ORDER — GUAIFENESIN 400 MG/1
400 TABLET ORAL
Qty: 14 | Refills: 0 | Status: ACTIVE | COMMUNITY
Start: 2025-02-13 | End: 1900-01-01

## 2025-02-13 RX ORDER — METOPROLOL TARTRATE 50 MG/1
50 TABLET ORAL TWICE DAILY
Refills: 0 | Status: ACTIVE | COMMUNITY

## 2025-02-13 RX ORDER — FAMOTIDINE 20 MG/1
20 TABLET, FILM COATED ORAL TWICE DAILY
Refills: 0 | Status: ACTIVE | COMMUNITY

## 2025-02-14 RX ORDER — METHOCARBAMOL 500 MG/1
500 TABLET, FILM COATED ORAL TWICE DAILY
Qty: 28 | Refills: 0 | Status: ACTIVE | COMMUNITY
Start: 2025-02-14 | End: 1900-01-01

## 2025-02-20 ENCOUNTER — APPOINTMENT (OUTPATIENT)
Dept: CARDIOTHORACIC SURGERY | Facility: CLINIC | Age: 60
End: 2025-02-20

## 2025-04-20 ENCOUNTER — NON-APPOINTMENT (OUTPATIENT)
Age: 60
End: 2025-04-20

## 2025-05-23 ENCOUNTER — EMERGENCY (EMERGENCY)
Facility: HOSPITAL | Age: 60
LOS: 0 days | Discharge: HOME | End: 2025-05-24
Attending: EMERGENCY MEDICINE
Payer: MEDICAID

## 2025-05-23 VITALS
OXYGEN SATURATION: 96 % | HEIGHT: 72 IN | TEMPERATURE: 98 F | WEIGHT: 250 LBS | SYSTOLIC BLOOD PRESSURE: 171 MMHG | HEART RATE: 69 BPM | RESPIRATION RATE: 18 BRPM | DIASTOLIC BLOOD PRESSURE: 80 MMHG

## 2025-05-23 DIAGNOSIS — M54.2 CERVICALGIA: ICD-10-CM

## 2025-05-23 DIAGNOSIS — Z98.890 OTHER SPECIFIED POSTPROCEDURAL STATES: Chronic | ICD-10-CM

## 2025-05-23 DIAGNOSIS — I10 ESSENTIAL (PRIMARY) HYPERTENSION: ICD-10-CM

## 2025-05-23 DIAGNOSIS — R42 DIZZINESS AND GIDDINESS: ICD-10-CM

## 2025-05-23 DIAGNOSIS — Z93.3 COLOSTOMY STATUS: Chronic | ICD-10-CM

## 2025-05-23 DIAGNOSIS — E11.9 TYPE 2 DIABETES MELLITUS WITHOUT COMPLICATIONS: ICD-10-CM

## 2025-05-23 DIAGNOSIS — I89.1 LYMPHANGITIS: ICD-10-CM

## 2025-05-23 DIAGNOSIS — Z95.2 PRESENCE OF PROSTHETIC HEART VALVE: ICD-10-CM

## 2025-05-23 DIAGNOSIS — R51.9 HEADACHE, UNSPECIFIED: ICD-10-CM

## 2025-05-23 DIAGNOSIS — E78.5 HYPERLIPIDEMIA, UNSPECIFIED: ICD-10-CM

## 2025-05-23 LAB — GAS PNL BLDV: SIGNIFICANT CHANGE UP

## 2025-05-23 PROCEDURE — 82803 BLOOD GASES ANY COMBINATION: CPT

## 2025-05-23 PROCEDURE — 80053 COMPREHEN METABOLIC PANEL: CPT

## 2025-05-23 PROCEDURE — 84100 ASSAY OF PHOSPHORUS: CPT

## 2025-05-23 PROCEDURE — 85018 HEMOGLOBIN: CPT

## 2025-05-23 PROCEDURE — 85025 COMPLETE CBC W/AUTO DIFF WBC: CPT

## 2025-05-23 PROCEDURE — 83735 ASSAY OF MAGNESIUM: CPT

## 2025-05-23 PROCEDURE — 71045 X-RAY EXAM CHEST 1 VIEW: CPT

## 2025-05-23 PROCEDURE — 85014 HEMATOCRIT: CPT

## 2025-05-23 PROCEDURE — 36000 PLACE NEEDLE IN VEIN: CPT

## 2025-05-23 PROCEDURE — 82962 GLUCOSE BLOOD TEST: CPT

## 2025-05-23 PROCEDURE — 83605 ASSAY OF LACTIC ACID: CPT

## 2025-05-23 PROCEDURE — 70498 CT ANGIOGRAPHY NECK: CPT

## 2025-05-23 PROCEDURE — 36415 COLL VENOUS BLD VENIPUNCTURE: CPT

## 2025-05-23 PROCEDURE — 70450 CT HEAD/BRAIN W/O DYE: CPT

## 2025-05-23 PROCEDURE — 93010 ELECTROCARDIOGRAM REPORT: CPT

## 2025-05-23 PROCEDURE — 70496 CT ANGIOGRAPHY HEAD: CPT

## 2025-05-23 PROCEDURE — 99285 EMERGENCY DEPT VISIT HI MDM: CPT | Mod: 25

## 2025-05-23 PROCEDURE — 84132 ASSAY OF SERUM POTASSIUM: CPT

## 2025-05-23 PROCEDURE — 84295 ASSAY OF SERUM SODIUM: CPT

## 2025-05-23 PROCEDURE — 82330 ASSAY OF CALCIUM: CPT

## 2025-05-23 PROCEDURE — 93005 ELECTROCARDIOGRAM TRACING: CPT

## 2025-05-23 RX ADMIN — Medication 2000 MILLILITER(S): at 23:50

## 2025-05-23 NOTE — ED ADULT TRIAGE NOTE - CHIEF COMPLAINT QUOTE
I was lying down in bed when my right ear went deaf, and then my other ear, my head was about to explode and I got real dizzy. I've been getting these episodes since I had the heart surgery, but it usually only lasts a few seconds, this time it lasted 15 minutes - patients  Patient presented to critical care provider

## 2025-05-24 VITALS
SYSTOLIC BLOOD PRESSURE: 153 MMHG | HEART RATE: 64 BPM | TEMPERATURE: 98 F | RESPIRATION RATE: 18 BRPM | DIASTOLIC BLOOD PRESSURE: 83 MMHG | OXYGEN SATURATION: 96 %

## 2025-05-24 LAB
ALBUMIN SERPL ELPH-MCNC: 4.3 G/DL — SIGNIFICANT CHANGE UP (ref 3.5–5.2)
ALP SERPL-CCNC: 52 U/L — SIGNIFICANT CHANGE UP (ref 30–115)
ALT FLD-CCNC: 31 U/L — SIGNIFICANT CHANGE UP (ref 0–41)
ANION GAP SERPL CALC-SCNC: 15 MMOL/L — HIGH (ref 7–14)
AST SERPL-CCNC: 26 U/L — SIGNIFICANT CHANGE UP (ref 0–41)
BASOPHILS # BLD AUTO: 0.05 K/UL — SIGNIFICANT CHANGE UP (ref 0–0.2)
BASOPHILS NFR BLD AUTO: 0.8 % — SIGNIFICANT CHANGE UP (ref 0–1)
BILIRUB SERPL-MCNC: 0.7 MG/DL — SIGNIFICANT CHANGE UP (ref 0.2–1.2)
BUN SERPL-MCNC: 13 MG/DL — SIGNIFICANT CHANGE UP (ref 10–20)
CALCIUM SERPL-MCNC: 9.6 MG/DL — SIGNIFICANT CHANGE UP (ref 8.4–10.5)
CHLORIDE SERPL-SCNC: 99 MMOL/L — SIGNIFICANT CHANGE UP (ref 98–110)
CO2 SERPL-SCNC: 23 MMOL/L — SIGNIFICANT CHANGE UP (ref 17–32)
CREAT SERPL-MCNC: 1.1 MG/DL — SIGNIFICANT CHANGE UP (ref 0.7–1.5)
EGFR: 76 ML/MIN/1.73M2 — SIGNIFICANT CHANGE UP
EGFR: 76 ML/MIN/1.73M2 — SIGNIFICANT CHANGE UP
EOSINOPHIL # BLD AUTO: 0.22 K/UL — SIGNIFICANT CHANGE UP (ref 0–0.7)
EOSINOPHIL NFR BLD AUTO: 3.5 % — SIGNIFICANT CHANGE UP (ref 0–8)
GLUCOSE SERPL-MCNC: 174 MG/DL — HIGH (ref 70–99)
HCT VFR BLD CALC: 48.7 % — SIGNIFICANT CHANGE UP (ref 42–52)
HGB BLD-MCNC: 16.7 G/DL — SIGNIFICANT CHANGE UP (ref 14–18)
IMM GRANULOCYTES NFR BLD AUTO: 0.5 % — HIGH (ref 0.1–0.3)
LYMPHOCYTES # BLD AUTO: 2.46 K/UL — SIGNIFICANT CHANGE UP (ref 1.2–3.4)
LYMPHOCYTES # BLD AUTO: 38.7 % — SIGNIFICANT CHANGE UP (ref 20.5–51.1)
MAGNESIUM SERPL-MCNC: 2 MG/DL — SIGNIFICANT CHANGE UP (ref 1.8–2.4)
MCHC RBC-ENTMCNC: 29.7 PG — SIGNIFICANT CHANGE UP (ref 27–31)
MCHC RBC-ENTMCNC: 34.3 G/DL — SIGNIFICANT CHANGE UP (ref 32–37)
MCV RBC AUTO: 86.5 FL — SIGNIFICANT CHANGE UP (ref 80–94)
MONOCYTES # BLD AUTO: 0.63 K/UL — HIGH (ref 0.1–0.6)
MONOCYTES NFR BLD AUTO: 9.9 % — HIGH (ref 1.7–9.3)
NEUTROPHILS # BLD AUTO: 2.96 K/UL — SIGNIFICANT CHANGE UP (ref 1.4–6.5)
NEUTROPHILS NFR BLD AUTO: 46.6 % — SIGNIFICANT CHANGE UP (ref 42.2–75.2)
NRBC BLD AUTO-RTO: 0 /100 WBCS — SIGNIFICANT CHANGE UP (ref 0–0)
PHOSPHATE SERPL-MCNC: 3.4 MG/DL — SIGNIFICANT CHANGE UP (ref 2.1–4.9)
PLATELET # BLD AUTO: 193 K/UL — SIGNIFICANT CHANGE UP (ref 130–400)
PMV BLD: 9.7 FL — SIGNIFICANT CHANGE UP (ref 7.4–10.4)
POTASSIUM SERPL-MCNC: 4.3 MMOL/L — SIGNIFICANT CHANGE UP (ref 3.5–5)
POTASSIUM SERPL-SCNC: 4.3 MMOL/L — SIGNIFICANT CHANGE UP (ref 3.5–5)
PROT SERPL-MCNC: 7.1 G/DL — SIGNIFICANT CHANGE UP (ref 6–8)
RBC # BLD: 5.63 M/UL — SIGNIFICANT CHANGE UP (ref 4.7–6.1)
RBC # FLD: 13.5 % — SIGNIFICANT CHANGE UP (ref 11.5–14.5)
SODIUM SERPL-SCNC: 137 MMOL/L — SIGNIFICANT CHANGE UP (ref 135–146)
WBC # BLD: 6.35 K/UL — SIGNIFICANT CHANGE UP (ref 4.8–10.8)
WBC # FLD AUTO: 6.35 K/UL — SIGNIFICANT CHANGE UP (ref 4.8–10.8)

## 2025-05-24 PROCEDURE — 71045 X-RAY EXAM CHEST 1 VIEW: CPT | Mod: 26

## 2025-05-24 PROCEDURE — 70498 CT ANGIOGRAPHY NECK: CPT | Mod: 26

## 2025-05-24 PROCEDURE — 70496 CT ANGIOGRAPHY HEAD: CPT | Mod: 26

## 2025-05-24 PROCEDURE — 70450 CT HEAD/BRAIN W/O DYE: CPT | Mod: 26

## 2025-05-24 NOTE — ED PROVIDER NOTE - CARE PLAN
1 Principal Discharge DX:	Dizziness  Secondary Diagnosis:	Pressure in head  Secondary Diagnosis:	Ear ringing

## 2025-05-24 NOTE — ED PROVIDER NOTE - NSPTACCESSSVCSAPPTDETAILS_ED_ALL_ED_FT
dizziness, ear ringing and head pressure episodes    ENT follow up for right neck pain, needs closer appointment

## 2025-05-24 NOTE — ED PROVIDER NOTE - NSFOLLOWUPINSTRUCTIONS_ED_ALL_ED_FT
Our Emergency Department Referral Coordinators will be reaching out to you in the next 24-48 hours from 9:00am to 5:00pm with a follow up appointment. Please expect a phone call from the hospital in that time frame. If you do not receive a call or if you have any questions or concerns, you can reach them at   [phone]    Dizziness    Dizziness can manifest as a feeling of unsteadiness or light-headedness. You may feel like you are about to faint. This condition can be caused by a number of things, including medicines, dehydration, or illness. Drink enough fluid to keep your urine clear or pale yellow. Do not drink alcohol and limit your caffeine intake. Avoid quick or sudden movements.  Rise slowly from chairs and steady yourself until you feel okay. In the morning, first sit up on the side of the bed.    SEEK IMMEDIATE MEDICAL CARE IF YOU HAVE ANY OF THE FOLLOWING SYMPTOMS: vomiting, changes in your vision or speech, weakness in your arms or legs, trouble speaking or swallowing, chest pain, abdominal pain, shortness of breath, sweating, bleeding, headache, neck pain, or fever.     Headache    A headache is pain or discomfort felt around the head or neck area. The specific cause of a headache may not be found as there are many types including tension headaches, migraine headaches, and cluster headaches. Watch your condition for any changes. Things you can do to manage your pain include taking over the counter and prescription medications as instructed by your health care provider, lying down in a dark quiet room, limiting stress, getting regular sleep, and refraining from alcohol and tobacco products.    SEEK IMMEDIATE MEDICAL CARE IF YOU HAVE ANY OF THE FOLLOWING SYMPTOMS: fever, vomiting, stiff neck, loss of vision, problems with speech, muscle weakness, loss of balance, trouble walking, passing out, or confusion.    Tinnitus  Tinnitus is when you hear a sound that there's no actual source for. It may sound like ringing in your ears or something else. The sound may be loud, soft, or somewhere in between. It can last for a few seconds or be constant for days. It can come and go.    Almost everyone has tinnitus at some point. It's not the same as hearing loss. But you may need to see a health care provider if:  It lasts for a long time.  It comes back often.  You have trouble sleeping and focusing.  What are the causes?  The cause of tinnitus is often unknown. In some cases, you may get it if:  You're around loud noises, such as from machines or music.  An object gets stuck in your ear.  Earwax builds up in your ear.  You drink a lot of alcohol or caffeine.  You take certain medicines.  You start to lose your hearing.  You may also get it from some medical conditions. These may include:  Ear or sinus infections.  Heart diseases.  High blood pressure.  Allergies.  Ménière's disease.  Problems with your thyroid.  A tumor. This is a growth of cells that isn't normal.  A weak, bulging blood vessel called an aneurysm near your ear.  What increases the risk?  You may be more likely to get tinnitus if:  You're around loud noises a lot.  You're older.  You drink alcohol.  You smoke.  What are the signs or symptoms?  The main symptom is hearing a sound that there's no source for. It may sound like ringing. It may also sound like:  Buzzing.  Sizzling.  Blowing air.  Hissing.  Whistling.  Other sounds may include:  Roaring.  Running water.  A musical note.  Tapping.  Humming.  You may have symptoms in one ear or both ears.    How is this diagnosed?  Tinnitus is diagnosed based on your symptoms, your medical history, and an exam. Your provider may do a full hearing test if your tinnitus:  Is in just one ear.  Makes it hard for you to hear.  Lasts 6 months or longer.  You may also need to see an expert in hearing disorders called an audiologist. They may ask you about your symptoms and how tinnitus affects your daily life. You may have other tests done. These may include:  A CT scan.  An MRI.  An angiogram. This shows how blood flows through your blood vessels.  How is this treated?  Treatment may include:  Therapy to help you manage the stress of living with tinnitus.  Finding ways to mask or cover the sound of tinnitus. These include:  Sound or white noise machines.  Devices that fit in your ear and play sounds or music.  A loud humidifier.  Acoustic neural stimulation. This is when you use headphones to listen to music that has a special signal in it. Over time, this signal may change some of the pathways in your brain. This can make you less sensitive to tinnitus. This treatment is used for very severe cases.  Using hearing aids or cochlear implants if your tinnitus is from hearing loss.  If your tinnitus is caused by a medical condition, treating the condition may make it go away.    Follow these instructions at home:  Managing symptoms    Outline of person in bed with head of bed raised.  A person wearing headphones.  Try to avoid being in loud places or around loud noises.  Wear earplugs or headphones when you're around loud noises.  Find ways to reduce stress. These may include meditation, yoga, or deep breathing.  Sleep with your head slightly raised.  General instructions    Take over-the-counter and prescription medicines only as told by your provider.  Track the things that cause symptoms (triggers). Try to avoid these things.  To stop your tinnitus from getting worse:  Do not drink alcohol.  Do not have caffeine.  Do not use any products that contain nicotine or tobacco. These products include cigarettes, chewing tobacco, and vaping devices, such as e-cigarettes. If you need help quitting, ask your provider.  Avoid using too much salt.  Get enough sleep each night.  Where to find more information  American Tinnitus Association: brayan.org  Contact a health care provider if:  Your symptoms last for 3 weeks or longer without stopping.  You have sudden hearing loss.  Your symptoms get worse or don't get better with home care.  You can't manage the stress of living with tinnitus.  Get help right away if:  You get tinnitus after a head injury.  You have tinnitus and:  Dizziness.  Nausea and vomiting.  Loss of balance.  A sudden, severe headache.  Changes to your eyesight.  Weakness in your face, arms, or legs.  These symptoms may be an emergency. Get help right away. Call 911.  Do not wait to see if the symptoms will go away.  Do not drive yourself to the hospital.  This information is not intended to replace advice given to you by your health care provider. Make sure you discuss any questions you have with your health care provider.

## 2025-05-24 NOTE — ED PROVIDER NOTE - PHYSICAL EXAMINATION
CONSTITUTIONAL: well-appearing, well nourished, non-toxic, NAD  SKIN: healed sternal surgical scar, Monitor in place  HEAD: NCAT  EYES: EOMI, PERRLA, no scleral icterus  ENT: Moist mucous membranes, normal pharynx with no erythema or exudates  NECK: non tender. Full ROM. No cervical LAD  CARD: RRR  RESP: clear to ausculation b/l  ABD: soft, non-tender, No CVA tenderness  EXT: Full ROM, no bony tenderness, no pedal edema, no calf tenderness  NEURO: normal motor. normal sensory. CN II-XII intact. Cerebellar testing normal. Normal gait.  PSYCH: Cooperative, appropriate.

## 2025-05-24 NOTE — ED PROVIDER NOTE - PATIENT PORTAL LINK FT
You can access the FollowMyHealth Patient Portal offered by [facility] by registering at the following website: [email or web address]. By joining [employer]’s FollowMyHealth portal, you will also be able to view your health information using other applications (apps) compatible with our system.

## 2025-05-24 NOTE — ED PROVIDER NOTE - ATTENDING CONTRIBUTION TO CARE
60-year-old male to ED with dizzy events at home he was lying in bed and had a loss of hearing in both ears with ringing and then felt lightheaded symptoms resolved in 10 minutes.  Since he had his aortic valve replacement in January has had multiple episodes that are similar but much lasting only seconds with less intensity.  Today due to the increased symptoms came into the ED.  Otherwise well-appearing nontoxic no other complaints completely back to baseline now no evidence of any chest pain chest pressure palpitations.  Afebrile vital signs stable exam as noted clear lungs bilaterally conjunctiva pink HEENT normal, regular rate and rhythm abdomen soft nontender and neuro nonfocal.

## 2025-05-24 NOTE — ED ADULT NURSE NOTE - NSFALLUNIVINTERV_ED_ALL_ED
Bed/Stretcher in lowest position, wheels locked, appropriate side rails in place/Call bell, personal items and telephone in reach/Instruct patient to call for assistance before getting out of bed/chair/stretcher/Non-slip footwear applied when patient is off stretcher/[place] to call system/Physically safe environment - no spills, clutter or unnecessary equipment/Purposeful proactive rounding/Room/bathroom lighting operational, light cord in reach

## 2025-05-24 NOTE — ED PROVIDER NOTE - OBJECTIVE STATEMENT
60-year-old male with history HTN, HLD, DM, aortic valve repair presents for evaluation of an episode of dizziness with head pressure and bilateral ear ringing which lasted about 15 minutes prior to arrival.  His symptoms have since improved patient states that after his aortic valve open heart surgery, he has been experiencing intermittent dizziness worsened with movement.  He also endorses right ear ringing intermittently.  He endorses having right-sided neck pain when laying down in a certain position, but denies any syncope, fevers, chills, sweats, chest pain, shortness of breath, URI symptoms, nausea, vomiting, diarrhea, constipation, urinary symptoms, weakness, numbness or tingling.      Patient also had an episode of A-fib after the open heart surgery and now has a monitor to his left chest until a few more days to assess if he needs to stay on anticoagulation.    Patient states he has a follow-up appointment with ENT in July.  Patient endorses having a cardiology appointment recently with an echocardiogram which was normal.

## 2025-05-29 NOTE — CHART NOTE - NSCHARTNOTEFT_GEN_A_CORE
1  -  5/27. req sooner appt-  5/28/ scheduled on 6/5/25 @ 9:15a w/ Dr. Chavez @ [facility] 5/29[facility]

## 2025-06-04 ENCOUNTER — APPOINTMENT (OUTPATIENT)
Dept: OPHTHALMOLOGY | Facility: CLINIC | Age: 60
End: 2025-06-04
Payer: MEDICAID

## 2025-06-04 ENCOUNTER — OUTPATIENT (OUTPATIENT)
Dept: OUTPATIENT SERVICES | Facility: HOSPITAL | Age: 60
LOS: 1 days | End: 2025-06-04
Payer: MEDICAID

## 2025-06-04 DIAGNOSIS — H53.8 OTHER VISUAL DISTURBANCES: ICD-10-CM

## 2025-06-04 DIAGNOSIS — Z98.890 OTHER SPECIFIED POSTPROCEDURAL STATES: Chronic | ICD-10-CM

## 2025-06-04 DIAGNOSIS — Z93.3 COLOSTOMY STATUS: Chronic | ICD-10-CM

## 2025-06-04 PROCEDURE — 92133 CPTRZD OPH DX IMG PST SGM ON: CPT | Mod: 26

## 2025-06-04 PROCEDURE — 92014 COMPRE OPH EXAM EST PT 1/>: CPT

## 2025-06-04 PROCEDURE — 92014 COMPRE OPH EXAM EST PT 1/>: CPT | Mod: 25

## 2025-06-05 ENCOUNTER — APPOINTMENT (OUTPATIENT)
Dept: OTOLARYNGOLOGY | Facility: CLINIC | Age: 60
End: 2025-06-05
Payer: MEDICAID

## 2025-06-05 VITALS — BODY MASS INDEX: 33.86 KG/M2 | WEIGHT: 250 LBS | HEIGHT: 72 IN

## 2025-06-05 DIAGNOSIS — I10 ESSENTIAL (PRIMARY) HYPERTENSION: ICD-10-CM

## 2025-06-05 DIAGNOSIS — R09.81 NASAL CONGESTION: ICD-10-CM

## 2025-06-05 DIAGNOSIS — H93.A9 PULSATILE TINNITUS, UNSPECIFIED EAR: ICD-10-CM

## 2025-06-05 DIAGNOSIS — R42 DIZZINESS AND GIDDINESS: ICD-10-CM

## 2025-06-05 DIAGNOSIS — J31.0 CHRONIC RHINITIS: ICD-10-CM

## 2025-06-05 PROCEDURE — 31231 NASAL ENDOSCOPY DX: CPT

## 2025-06-05 PROCEDURE — 92557 COMPREHENSIVE HEARING TEST: CPT

## 2025-06-05 PROCEDURE — 92550 TYMPANOMETRY & REFLEX THRESH: CPT | Mod: 52

## 2025-06-05 PROCEDURE — 99204 OFFICE O/P NEW MOD 45 MIN: CPT | Mod: 25

## 2025-06-05 RX ORDER — FLUTICASONE PROPIONATE 50 UG/1
50 SPRAY, METERED NASAL DAILY
Qty: 1 | Refills: 3 | Status: ACTIVE | COMMUNITY
Start: 2025-06-05 | End: 1900-01-01

## 2025-06-05 RX ORDER — METHYLPREDNISOLONE 4 MG/1
4 TABLET ORAL
Qty: 1 | Refills: 0 | Status: ACTIVE | COMMUNITY
Start: 2025-06-05 | End: 1900-01-01

## 2025-06-05 RX ORDER — LEVOCETIRIZINE DIHYDROCHLORIDE 5 MG/1
5 TABLET ORAL
Qty: 30 | Refills: 3 | Status: ACTIVE | COMMUNITY
Start: 2025-06-05 | End: 1900-01-01

## 2025-06-10 DIAGNOSIS — H35.033 HYPERTENSIVE RETINOPATHY, BILATERAL: ICD-10-CM

## 2025-06-10 DIAGNOSIS — H47.292 OTHER OPTIC ATROPHY, LEFT EYE: ICD-10-CM

## 2025-06-10 DIAGNOSIS — H50.53 VERTICAL HETEROPHORIA: ICD-10-CM

## 2025-06-10 DIAGNOSIS — R42 DIZZINESS AND GIDDINESS: ICD-10-CM

## 2025-06-10 DIAGNOSIS — G43.B0 OPHTHALMOPLEGIC MIGRAINE, NOT INTRACTABLE: ICD-10-CM

## 2025-06-10 DIAGNOSIS — E11.9 TYPE 2 DIABETES MELLITUS WITHOUT COMPLICATIONS: ICD-10-CM

## 2025-06-18 ENCOUNTER — APPOINTMENT (OUTPATIENT)
Dept: NEUROLOGY | Facility: CLINIC | Age: 60
End: 2025-06-18
Payer: MEDICAID

## 2025-06-18 ENCOUNTER — RESULT REVIEW (OUTPATIENT)
Age: 60
End: 2025-06-18

## 2025-06-18 ENCOUNTER — NON-APPOINTMENT (OUTPATIENT)
Age: 60
End: 2025-06-18

## 2025-06-18 VITALS — SYSTOLIC BLOOD PRESSURE: 145 MMHG | DIASTOLIC BLOOD PRESSURE: 84 MMHG

## 2025-06-18 VITALS
WEIGHT: 250 LBS | SYSTOLIC BLOOD PRESSURE: 160 MMHG | BODY MASS INDEX: 33.86 KG/M2 | DIASTOLIC BLOOD PRESSURE: 83 MMHG | HEART RATE: 89 BPM | HEIGHT: 72 IN

## 2025-06-18 PROBLEM — R51.9 HEADACHE: Status: ACTIVE | Noted: 2025-06-18

## 2025-06-18 PROCEDURE — 99204 OFFICE O/P NEW MOD 45 MIN: CPT

## 2025-06-19 ENCOUNTER — OUTPATIENT (OUTPATIENT)
Dept: OUTPATIENT SERVICES | Facility: HOSPITAL | Age: 60
LOS: 1 days | End: 2025-06-19
Payer: MEDICAID

## 2025-06-19 DIAGNOSIS — Z98.890 OTHER SPECIFIED POSTPROCEDURAL STATES: Chronic | ICD-10-CM

## 2025-06-19 DIAGNOSIS — Z93.3 COLOSTOMY STATUS: Chronic | ICD-10-CM

## 2025-06-19 DIAGNOSIS — R42 DIZZINESS AND GIDDINESS: ICD-10-CM

## 2025-06-19 DIAGNOSIS — M54.2 CERVICALGIA: ICD-10-CM

## 2025-06-19 PROCEDURE — 72052 X-RAY EXAM NECK SPINE 6/>VWS: CPT

## 2025-06-19 PROCEDURE — 72052 X-RAY EXAM NECK SPINE 6/>VWS: CPT | Mod: 26

## 2025-06-20 DIAGNOSIS — M54.2 CERVICALGIA: ICD-10-CM

## 2025-06-20 DIAGNOSIS — R42 DIZZINESS AND GIDDINESS: ICD-10-CM

## 2025-06-26 ENCOUNTER — APPOINTMENT (OUTPATIENT)
Dept: OTOLARYNGOLOGY | Facility: CLINIC | Age: 60
End: 2025-06-26

## 2025-07-17 RX ORDER — DIAZEPAM 5 MG/1
5 TABLET ORAL
Qty: 2 | Refills: 0 | Status: ACTIVE | COMMUNITY
Start: 2025-07-17 | End: 1900-01-01

## 2025-08-13 ENCOUNTER — APPOINTMENT (OUTPATIENT)
Dept: OPHTHALMOLOGY | Facility: CLINIC | Age: 60
End: 2025-08-13

## 2025-08-20 ENCOUNTER — APPOINTMENT (OUTPATIENT)
Dept: OPHTHALMOLOGY | Facility: CLINIC | Age: 60
End: 2025-08-20

## 2025-08-20 ENCOUNTER — OUTPATIENT (OUTPATIENT)
Dept: OUTPATIENT SERVICES | Facility: HOSPITAL | Age: 60
LOS: 1 days | End: 2025-08-20
Payer: MEDICAID

## 2025-08-20 DIAGNOSIS — Z98.890 OTHER SPECIFIED POSTPROCEDURAL STATES: Chronic | ICD-10-CM

## 2025-08-20 DIAGNOSIS — Z93.3 COLOSTOMY STATUS: Chronic | ICD-10-CM

## 2025-08-20 DIAGNOSIS — H53.8 OTHER VISUAL DISTURBANCES: ICD-10-CM

## 2025-08-20 PROCEDURE — 99215 OFFICE O/P EST HI 40 MIN: CPT

## 2025-08-28 ENCOUNTER — APPOINTMENT (OUTPATIENT)
Age: 60
End: 2025-08-28

## 2025-08-28 DIAGNOSIS — H53.8 OTHER VISUAL DISTURBANCES: ICD-10-CM

## 2025-08-28 DIAGNOSIS — H50.53 VERTICAL HETEROPHORIA: ICD-10-CM

## 2025-08-28 DIAGNOSIS — H53.19 OTHER SUBJECTIVE VISUAL DISTURBANCES: ICD-10-CM

## 2025-09-03 ENCOUNTER — APPOINTMENT (OUTPATIENT)
Age: 60
End: 2025-09-03

## 2025-09-08 ENCOUNTER — APPOINTMENT (OUTPATIENT)
Dept: OPHTHALMOLOGY | Facility: CLINIC | Age: 60
End: 2025-09-08

## 2025-09-08 ENCOUNTER — APPOINTMENT (OUTPATIENT)
Age: 60
End: 2025-09-08

## 2025-09-10 ENCOUNTER — APPOINTMENT (OUTPATIENT)
Age: 60
End: 2025-09-10

## 2025-09-12 ENCOUNTER — APPOINTMENT (OUTPATIENT)
Age: 60
End: 2025-09-12

## 2025-09-15 ENCOUNTER — APPOINTMENT (OUTPATIENT)
Age: 60
End: 2025-09-15

## 2025-09-16 ENCOUNTER — APPOINTMENT (OUTPATIENT)
Dept: ELECTROPHYSIOLOGY | Facility: CLINIC | Age: 60
End: 2025-09-16
Payer: MEDICAID

## 2025-09-16 VITALS
DIASTOLIC BLOOD PRESSURE: 79 MMHG | SYSTOLIC BLOOD PRESSURE: 144 MMHG | HEIGHT: 78 IN | BODY MASS INDEX: 29.16 KG/M2 | HEART RATE: 82 BPM | WEIGHT: 252 LBS

## 2025-09-16 DIAGNOSIS — R42 DIZZINESS AND GIDDINESS: ICD-10-CM

## 2025-09-16 DIAGNOSIS — I48.91 UNSPECIFIED ATRIAL FIBRILLATION: ICD-10-CM

## 2025-09-16 PROCEDURE — 99204 OFFICE O/P NEW MOD 45 MIN: CPT | Mod: 25

## 2025-09-16 PROCEDURE — 93000 ELECTROCARDIOGRAM COMPLETE: CPT

## 2025-09-16 RX ORDER — METOPROLOL SUCCINATE 25 MG/1
25 TABLET, EXTENDED RELEASE ORAL DAILY
Qty: 90 | Refills: 3 | Status: ACTIVE | COMMUNITY
Start: 2025-09-16 | End: 1900-01-01

## 2025-09-17 ENCOUNTER — APPOINTMENT (OUTPATIENT)
Dept: OPHTHALMOLOGY | Facility: CLINIC | Age: 60
End: 2025-09-17
Payer: MEDICAID

## 2025-09-17 ENCOUNTER — APPOINTMENT (OUTPATIENT)
Age: 60
End: 2025-09-17

## 2025-09-17 PROCEDURE — 76512 OPH US DX B-SCAN: CPT | Mod: 26

## 2025-09-17 PROCEDURE — 92250 FUNDUS PHOTOGRAPHY W/I&R: CPT | Mod: 26

## 2025-09-17 PROCEDURE — 92014 COMPRE OPH EXAM EST PT 1/>: CPT | Mod: 25

## 2025-09-18 ENCOUNTER — APPOINTMENT (OUTPATIENT)
Dept: OTOLARYNGOLOGY | Facility: CLINIC | Age: 60
End: 2025-09-18
Payer: MEDICAID

## 2025-09-18 DIAGNOSIS — H93.A9 PULSATILE TINNITUS, UNSPECIFIED EAR: ICD-10-CM

## 2025-09-18 DIAGNOSIS — J31.0 CHRONIC RHINITIS: ICD-10-CM

## 2025-09-18 PROCEDURE — 99213 OFFICE O/P EST LOW 20 MIN: CPT

## 2025-09-18 PROCEDURE — 92540 BASIC VESTIBULAR EVALUATION: CPT

## 2025-09-18 PROCEDURE — 92547 SUPPLEMENTAL ELECTRICAL TEST: CPT

## 2025-09-19 ENCOUNTER — APPOINTMENT (OUTPATIENT)
Age: 60
End: 2025-09-19

## 2025-09-23 PROBLEM — I48.91 ATRIAL FIBRILLATION, CONTROLLED: Status: ACTIVE | Noted: 2025-09-23
